# Patient Record
Sex: MALE | Employment: UNEMPLOYED | URBAN - METROPOLITAN AREA
[De-identification: names, ages, dates, MRNs, and addresses within clinical notes are randomized per-mention and may not be internally consistent; named-entity substitution may affect disease eponyms.]

---

## 2021-09-16 ENCOUNTER — APPOINTMENT (OUTPATIENT)
Dept: GENERAL RADIOLOGY | Facility: HOSPITAL | Age: 41
End: 2021-09-16
Attending: STUDENT IN AN ORGANIZED HEALTH CARE EDUCATION/TRAINING PROGRAM
Payer: MEDICAID

## 2021-09-16 ENCOUNTER — HOSPITAL ENCOUNTER (OUTPATIENT)
Facility: HOSPITAL | Age: 41
Setting detail: OBSERVATION
LOS: 1 days | Discharge: HOME | End: 2021-09-17
Attending: EMERGENCY MEDICINE | Admitting: STUDENT IN AN ORGANIZED HEALTH CARE EDUCATION/TRAINING PROGRAM
Payer: MEDICAID

## 2021-09-16 ENCOUNTER — ANESTHESIA EVENT (OUTPATIENT)
Dept: SURGERY | Facility: HOSPITAL | Age: 41
End: 2021-09-16
Payer: MEDICAID

## 2021-09-16 ENCOUNTER — APPOINTMENT (OUTPATIENT)
Dept: ULTRASOUND IMAGING | Facility: HOSPITAL | Age: 41
End: 2021-09-16
Attending: EMERGENCY MEDICINE
Payer: MEDICAID

## 2021-09-16 ENCOUNTER — ANESTHESIA (OUTPATIENT)
Dept: SURGERY | Facility: HOSPITAL | Age: 41
End: 2021-09-16
Payer: MEDICAID

## 2021-09-16 DIAGNOSIS — K80.20 CHOLELITHIASIS: ICD-10-CM

## 2021-09-16 DIAGNOSIS — K81.0 ACUTE CHOLECYSTITIS: ICD-10-CM

## 2021-09-16 DIAGNOSIS — R10.11 ABDOMINAL PAIN, RIGHT UPPER QUADRANT: Primary | ICD-10-CM

## 2021-09-16 PROBLEM — K80.00 CHOLELITHIASIS WITH ACUTE CHOLECYSTITIS WITHOUT BILIARY OBSTRUCTION: Status: ACTIVE | Noted: 2021-09-16

## 2021-09-16 PROBLEM — E87.6 HYPOKALEMIA: Status: ACTIVE | Noted: 2021-09-16

## 2021-09-16 PROBLEM — R73.9 HYPERGLYCEMIA: Status: ACTIVE | Noted: 2021-09-16

## 2021-09-16 LAB
ALBUMIN SERPL-MCNC: 3.5 G/DL (ref 3.4–5)
ALBUMIN/GLOB SERPL: 0.9 {RATIO} (ref 1–2)
ALP LIVER SERPL-CCNC: 69 U/L
ALT SERPL-CCNC: 45 U/L
ANION GAP SERPL CALC-SCNC: 6 MMOL/L (ref 0–18)
AST SERPL-CCNC: 27 U/L (ref 15–37)
BASOPHILS # BLD AUTO: 0.13 X10(3) UL (ref 0–0.2)
BASOPHILS NFR BLD AUTO: 1.1 %
BILIRUB SERPL-MCNC: 0.3 MG/DL (ref 0.1–2)
BUN BLD-MCNC: 18 MG/DL (ref 7–18)
CALCIUM BLD-MCNC: 8.8 MG/DL (ref 8.5–10.1)
CHLORIDE SERPL-SCNC: 105 MMOL/L (ref 98–112)
CO2 SERPL-SCNC: 29 MMOL/L (ref 21–32)
CREAT BLD-MCNC: 1.22 MG/DL
EOSINOPHIL # BLD AUTO: 0.46 X10(3) UL (ref 0–0.7)
EOSINOPHIL NFR BLD AUTO: 4 %
ERYTHROCYTE [DISTWIDTH] IN BLOOD BY AUTOMATED COUNT: 12.9 %
GLOBULIN PLAS-MCNC: 3.9 G/DL (ref 2.8–4.4)
GLUCOSE BLD-MCNC: 159 MG/DL (ref 70–99)
HCT VFR BLD AUTO: 46.6 %
HGB BLD-MCNC: 16 G/DL
IMM GRANULOCYTES # BLD AUTO: 0.05 X10(3) UL (ref 0–1)
IMM GRANULOCYTES NFR BLD: 0.4 %
LIPASE SERPL-CCNC: 100 U/L (ref 73–393)
LYMPHOCYTES # BLD AUTO: 2.34 X10(3) UL (ref 1–4)
LYMPHOCYTES NFR BLD AUTO: 20.2 %
MCH RBC QN AUTO: 30.5 PG (ref 26–34)
MCHC RBC AUTO-ENTMCNC: 34.3 G/DL (ref 31–37)
MCV RBC AUTO: 88.9 FL
MONOCYTES # BLD AUTO: 0.61 X10(3) UL (ref 0.1–1)
MONOCYTES NFR BLD AUTO: 5.3 %
NEUTROPHILS # BLD AUTO: 7.98 X10 (3) UL (ref 1.5–7.7)
NEUTROPHILS # BLD AUTO: 7.98 X10(3) UL (ref 1.5–7.7)
NEUTROPHILS NFR BLD AUTO: 69 %
OSMOLALITY SERPL CALC.SUM OF ELEC: 295 MOSM/KG (ref 275–295)
PLATELET # BLD AUTO: 235 10(3)UL (ref 150–450)
POTASSIUM SERPL-SCNC: 3.5 MMOL/L (ref 3.5–5.1)
PROT SERPL-MCNC: 7.4 G/DL (ref 6.4–8.2)
RBC # BLD AUTO: 5.24 X10(6)UL
SARS-COV-2 RNA RESP QL NAA+PROBE: NOT DETECTED
SODIUM SERPL-SCNC: 140 MMOL/L (ref 136–145)
WBC # BLD AUTO: 11.6 X10(3) UL (ref 4–11)

## 2021-09-16 PROCEDURE — 76700 US EXAM ABDOM COMPLETE: CPT | Performed by: EMERGENCY MEDICINE

## 2021-09-16 PROCEDURE — 0WQF0ZZ REPAIR ABDOMINAL WALL, OPEN APPROACH: ICD-10-PCS | Performed by: STUDENT IN AN ORGANIZED HEALTH CARE EDUCATION/TRAINING PROGRAM

## 2021-09-16 PROCEDURE — BF141ZZ FLUOROSCOPY OF GALLBLADDER, BILE DUCTS AND PANCREATIC DUCTS USING LOW OSMOLAR CONTRAST: ICD-10-PCS | Performed by: STUDENT IN AN ORGANIZED HEALTH CARE EDUCATION/TRAINING PROGRAM

## 2021-09-16 PROCEDURE — 47563 LAPARO CHOLECYSTECTOMY/GRAPH: CPT | Performed by: SURGERY

## 2021-09-16 PROCEDURE — 47563 LAPARO CHOLECYSTECTOMY/GRAPH: CPT | Performed by: STUDENT IN AN ORGANIZED HEALTH CARE EDUCATION/TRAINING PROGRAM

## 2021-09-16 PROCEDURE — 0FT44ZZ RESECTION OF GALLBLADDER, PERCUTANEOUS ENDOSCOPIC APPROACH: ICD-10-PCS | Performed by: STUDENT IN AN ORGANIZED HEALTH CARE EDUCATION/TRAINING PROGRAM

## 2021-09-16 PROCEDURE — 74300 X-RAY BILE DUCTS/PANCREAS: CPT | Performed by: STUDENT IN AN ORGANIZED HEALTH CARE EDUCATION/TRAINING PROGRAM

## 2021-09-16 RX ORDER — HYDROMORPHONE HYDROCHLORIDE 1 MG/ML
0.2 INJECTION, SOLUTION INTRAMUSCULAR; INTRAVENOUS; SUBCUTANEOUS EVERY 2 HOUR PRN
Status: DISCONTINUED | OUTPATIENT
Start: 2021-09-16 | End: 2021-09-16 | Stop reason: HOSPADM

## 2021-09-16 RX ORDER — SODIUM CHLORIDE, SODIUM LACTATE, POTASSIUM CHLORIDE, CALCIUM CHLORIDE 600; 310; 30; 20 MG/100ML; MG/100ML; MG/100ML; MG/100ML
INJECTION, SOLUTION INTRAVENOUS CONTINUOUS PRN
Status: DISCONTINUED | OUTPATIENT
Start: 2021-09-16 | End: 2021-09-16 | Stop reason: SURG

## 2021-09-16 RX ORDER — OXYCODONE HYDROCHLORIDE 5 MG/1
5 TABLET ORAL EVERY 6 HOURS PRN
Qty: 10 TABLET | Refills: 0 | Status: SHIPPED | OUTPATIENT
Start: 2021-09-16

## 2021-09-16 RX ORDER — HYDROMORPHONE HYDROCHLORIDE 1 MG/ML
0.8 INJECTION, SOLUTION INTRAMUSCULAR; INTRAVENOUS; SUBCUTANEOUS EVERY 2 HOUR PRN
Status: DISCONTINUED | OUTPATIENT
Start: 2021-09-16 | End: 2021-09-16

## 2021-09-16 RX ORDER — DIPHENHYDRAMINE HYDROCHLORIDE 50 MG/ML
12.5 INJECTION INTRAMUSCULAR; INTRAVENOUS AS NEEDED
Status: DISCONTINUED | OUTPATIENT
Start: 2021-09-16 | End: 2021-09-16 | Stop reason: HOSPADM

## 2021-09-16 RX ORDER — KETOROLAC TROMETHAMINE 30 MG/ML
INJECTION, SOLUTION INTRAMUSCULAR; INTRAVENOUS AS NEEDED
Status: DISCONTINUED | OUTPATIENT
Start: 2021-09-16 | End: 2021-09-16 | Stop reason: SURG

## 2021-09-16 RX ORDER — ONDANSETRON 2 MG/ML
INJECTION INTRAMUSCULAR; INTRAVENOUS AS NEEDED
Status: DISCONTINUED | OUTPATIENT
Start: 2021-09-16 | End: 2021-09-16 | Stop reason: SURG

## 2021-09-16 RX ORDER — HYDROMORPHONE HYDROCHLORIDE 1 MG/ML
1 INJECTION, SOLUTION INTRAMUSCULAR; INTRAVENOUS; SUBCUTANEOUS ONCE
Status: COMPLETED | OUTPATIENT
Start: 2021-09-16 | End: 2021-09-16

## 2021-09-16 RX ORDER — DEXAMETHASONE SODIUM PHOSPHATE 4 MG/ML
VIAL (ML) INJECTION AS NEEDED
Status: DISCONTINUED | OUTPATIENT
Start: 2021-09-16 | End: 2021-09-16 | Stop reason: SURG

## 2021-09-16 RX ORDER — ROCURONIUM BROMIDE 10 MG/ML
INJECTION, SOLUTION INTRAVENOUS AS NEEDED
Status: DISCONTINUED | OUTPATIENT
Start: 2021-09-16 | End: 2021-09-16 | Stop reason: SURG

## 2021-09-16 RX ORDER — SODIUM CHLORIDE, SODIUM LACTATE, POTASSIUM CHLORIDE, CALCIUM CHLORIDE 600; 310; 30; 20 MG/100ML; MG/100ML; MG/100ML; MG/100ML
INJECTION, SOLUTION INTRAVENOUS CONTINUOUS
Status: DISCONTINUED | OUTPATIENT
Start: 2021-09-16 | End: 2021-09-16 | Stop reason: HOSPADM

## 2021-09-16 RX ORDER — IBUPROFEN 200 MG
200 TABLET ORAL EVERY 6 HOURS PRN
COMMUNITY

## 2021-09-16 RX ORDER — ONDANSETRON 2 MG/ML
4 INJECTION INTRAMUSCULAR; INTRAVENOUS EVERY 6 HOURS PRN
Status: DISCONTINUED | OUTPATIENT
Start: 2021-09-16 | End: 2021-09-17

## 2021-09-16 RX ORDER — SODIUM CHLORIDE 9 MG/ML
INJECTION, SOLUTION INTRAVENOUS CONTINUOUS
Status: ACTIVE | OUTPATIENT
Start: 2021-09-16 | End: 2021-09-17

## 2021-09-16 RX ORDER — OXYCODONE HYDROCHLORIDE 5 MG/1
5 TABLET ORAL EVERY 6 HOURS PRN
Status: DISCONTINUED | OUTPATIENT
Start: 2021-09-16 | End: 2021-09-17

## 2021-09-16 RX ORDER — BUPIVACAINE HYDROCHLORIDE AND EPINEPHRINE 5; 5 MG/ML; UG/ML
INJECTION, SOLUTION EPIDURAL; INTRACAUDAL; PERINEURAL AS NEEDED
Status: DISCONTINUED | OUTPATIENT
Start: 2021-09-16 | End: 2021-09-16 | Stop reason: HOSPADM

## 2021-09-16 RX ORDER — HYDROMORPHONE HYDROCHLORIDE 1 MG/ML
0.4 INJECTION, SOLUTION INTRAMUSCULAR; INTRAVENOUS; SUBCUTANEOUS EVERY 5 MIN PRN
Status: DISCONTINUED | OUTPATIENT
Start: 2021-09-16 | End: 2021-09-16 | Stop reason: HOSPADM

## 2021-09-16 RX ORDER — ACETAMINOPHEN 325 MG/1
650 TABLET ORAL EVERY 6 HOURS PRN
Status: DISCONTINUED | OUTPATIENT
Start: 2021-09-16 | End: 2021-09-17

## 2021-09-16 RX ORDER — SODIUM CHLORIDE 9 MG/ML
1000 INJECTION, SOLUTION INTRAVENOUS CONTINUOUS
Status: DISCONTINUED | OUTPATIENT
Start: 2021-09-16 | End: 2021-09-16 | Stop reason: HOSPADM

## 2021-09-16 RX ORDER — IBUPROFEN 400 MG/1
800 TABLET ORAL EVERY 6 HOURS PRN
Status: DISCONTINUED | OUTPATIENT
Start: 2021-09-16 | End: 2021-09-17

## 2021-09-16 RX ORDER — MIDAZOLAM HYDROCHLORIDE 1 MG/ML
INJECTION INTRAMUSCULAR; INTRAVENOUS AS NEEDED
Status: DISCONTINUED | OUTPATIENT
Start: 2021-09-16 | End: 2021-09-16 | Stop reason: SURG

## 2021-09-16 RX ORDER — METOCLOPRAMIDE HYDROCHLORIDE 5 MG/ML
10 INJECTION INTRAMUSCULAR; INTRAVENOUS AS NEEDED
Status: DISCONTINUED | OUTPATIENT
Start: 2021-09-16 | End: 2021-09-16 | Stop reason: HOSPADM

## 2021-09-16 RX ORDER — ONDANSETRON 2 MG/ML
4 INJECTION INTRAMUSCULAR; INTRAVENOUS ONCE
Status: COMPLETED | OUTPATIENT
Start: 2021-09-16 | End: 2021-09-16

## 2021-09-16 RX ORDER — MIDAZOLAM HYDROCHLORIDE 1 MG/ML
1 INJECTION INTRAMUSCULAR; INTRAVENOUS EVERY 5 MIN PRN
Status: DISCONTINUED | OUTPATIENT
Start: 2021-09-16 | End: 2021-09-16 | Stop reason: HOSPADM

## 2021-09-16 RX ORDER — SENNA AND DOCUSATE SODIUM 50; 8.6 MG/1; MG/1
1 TABLET, FILM COATED ORAL DAILY
Qty: 30 TABLET | Refills: 0 | Status: SHIPPED | OUTPATIENT
Start: 2021-09-16

## 2021-09-16 RX ORDER — NALOXONE HYDROCHLORIDE 0.4 MG/ML
80 INJECTION, SOLUTION INTRAMUSCULAR; INTRAVENOUS; SUBCUTANEOUS AS NEEDED
Status: DISCONTINUED | OUTPATIENT
Start: 2021-09-16 | End: 2021-09-16 | Stop reason: HOSPADM

## 2021-09-16 RX ORDER — HYDROMORPHONE HYDROCHLORIDE 1 MG/ML
0.2 INJECTION, SOLUTION INTRAMUSCULAR; INTRAVENOUS; SUBCUTANEOUS EVERY 2 HOUR PRN
Status: DISCONTINUED | OUTPATIENT
Start: 2021-09-16 | End: 2021-09-16

## 2021-09-16 RX ORDER — HYDROMORPHONE HYDROCHLORIDE 1 MG/ML
0.5 INJECTION, SOLUTION INTRAMUSCULAR; INTRAVENOUS; SUBCUTANEOUS ONCE
Status: COMPLETED | OUTPATIENT
Start: 2021-09-16 | End: 2021-09-16

## 2021-09-16 RX ORDER — HYDROCODONE BITARTRATE AND ACETAMINOPHEN 5; 325 MG/1; MG/1
2 TABLET ORAL AS NEEDED
Status: DISCONTINUED | OUTPATIENT
Start: 2021-09-16 | End: 2021-09-16 | Stop reason: HOSPADM

## 2021-09-16 RX ORDER — HYDROMORPHONE HYDROCHLORIDE 1 MG/ML
0.1 INJECTION, SOLUTION INTRAMUSCULAR; INTRAVENOUS; SUBCUTANEOUS EVERY 2 HOUR PRN
Status: DISCONTINUED | OUTPATIENT
Start: 2021-09-16 | End: 2021-09-16 | Stop reason: HOSPADM

## 2021-09-16 RX ORDER — MORPHINE SULFATE 4 MG/ML
4 INJECTION, SOLUTION INTRAMUSCULAR; INTRAVENOUS ONCE
Status: COMPLETED | OUTPATIENT
Start: 2021-09-16 | End: 2021-09-16

## 2021-09-16 RX ORDER — SODIUM CHLORIDE 9 MG/ML
INJECTION, SOLUTION INTRAVENOUS CONTINUOUS
Status: DISCONTINUED | OUTPATIENT
Start: 2021-09-16 | End: 2021-09-16

## 2021-09-16 RX ORDER — HYDROMORPHONE HYDROCHLORIDE 1 MG/ML
0.4 INJECTION, SOLUTION INTRAMUSCULAR; INTRAVENOUS; SUBCUTANEOUS EVERY 2 HOUR PRN
Status: DISCONTINUED | OUTPATIENT
Start: 2021-09-16 | End: 2021-09-16

## 2021-09-16 RX ORDER — ONDANSETRON 2 MG/ML
4 INJECTION INTRAMUSCULAR; INTRAVENOUS AS NEEDED
Status: DISCONTINUED | OUTPATIENT
Start: 2021-09-16 | End: 2021-09-16 | Stop reason: HOSPADM

## 2021-09-16 RX ORDER — HYDROCODONE BITARTRATE AND ACETAMINOPHEN 5; 325 MG/1; MG/1
1 TABLET ORAL AS NEEDED
Status: DISCONTINUED | OUTPATIENT
Start: 2021-09-16 | End: 2021-09-16 | Stop reason: HOSPADM

## 2021-09-16 RX ORDER — HYDROMORPHONE HYDROCHLORIDE 1 MG/ML
0.4 INJECTION, SOLUTION INTRAMUSCULAR; INTRAVENOUS; SUBCUTANEOUS EVERY 2 HOUR PRN
Status: DISCONTINUED | OUTPATIENT
Start: 2021-09-16 | End: 2021-09-16 | Stop reason: HOSPADM

## 2021-09-16 RX ORDER — MEPERIDINE HYDROCHLORIDE 25 MG/ML
12.5 INJECTION INTRAMUSCULAR; INTRAVENOUS; SUBCUTANEOUS AS NEEDED
Status: DISCONTINUED | OUTPATIENT
Start: 2021-09-16 | End: 2021-09-16 | Stop reason: HOSPADM

## 2021-09-16 RX ADMIN — DEXAMETHASONE SODIUM PHOSPHATE 8 MG: 4 MG/ML VIAL (ML) INJECTION at 17:02:00

## 2021-09-16 RX ADMIN — SODIUM CHLORIDE, SODIUM LACTATE, POTASSIUM CHLORIDE, CALCIUM CHLORIDE: 600; 310; 30; 20 INJECTION, SOLUTION INTRAVENOUS at 17:53:00

## 2021-09-16 RX ADMIN — MIDAZOLAM HYDROCHLORIDE 2 MG: 1 INJECTION INTRAMUSCULAR; INTRAVENOUS at 16:34:00

## 2021-09-16 RX ADMIN — KETOROLAC TROMETHAMINE 30 MG: 30 INJECTION, SOLUTION INTRAMUSCULAR; INTRAVENOUS at 17:45:00

## 2021-09-16 RX ADMIN — ONDANSETRON 4 MG: 2 INJECTION INTRAMUSCULAR; INTRAVENOUS at 17:02:00

## 2021-09-16 RX ADMIN — SODIUM CHLORIDE, SODIUM LACTATE, POTASSIUM CHLORIDE, CALCIUM CHLORIDE: 600; 310; 30; 20 INJECTION, SOLUTION INTRAVENOUS at 16:32:00

## 2021-09-16 RX ADMIN — ROCURONIUM BROMIDE 70 MG: 10 INJECTION, SOLUTION INTRAVENOUS at 16:37:00

## 2021-09-16 NOTE — ANESTHESIA PREPROCEDURE EVALUATION
PRE-OP EVALUATION    Patient Name: Diana Mcdowell    Admit Diagnosis: Acute cholecystitis [K81.0]  Abdominal pain, right upper quadrant [R10.11]    Pre-op Diagnosis: Cholelithiasis [K80.20]    LAPAROSCOPIC CHOLECYSTECTOMY WITH INTRAOPERATIVE Alveta Konstantin (six) hours as needed for Pain., Disp: , Rfl: , 9/15/2021 at Unknown time        Allergies: Patient has no known allergies. Anesthesia Evaluation    Patient summary reviewed.     Anesthetic Complications  (-) history of anesthetic complications dental trauma, nausea/vomiting  Plan/risks discussed with: patient and spouse                Present on Admission:  • Hypokalemia  • Hyperglycemia

## 2021-09-16 NOTE — ANESTHESIA PROCEDURE NOTES
Peripheral IV  Date/Time: 9/16/2021 4:50 PM  Inserted by: Rebekah Harkins MD    Placement  Needle size: 18 G  Laterality: right  Location: hand  Local anesthetic: none  Site prep: chlorhexidine and alcohol  Technique: anatomical landmarks  Attempts: 1

## 2021-09-16 NOTE — OPERATIVE REPORT
General Surgery Operative Note  Emily Mario Location: OR   CSN [id number] MRN [id number]    1980 Age 38year old   Admission Date 2021 Operation Date 2021   Attending Physician Arina Martinez, [address] [place] [address] Operating Physician Monalisa Ross DESCRIPTION OF PROCEDURE:   After confirmation of informed consent, the patient was transferred to the operating room under the care of the surgical anesthesia team and placed in the supine position.   Left arm was tucked in general anesthesia was induced scissors. The gallbladder was then removed from the liver bed using electrocautery. Hemostasis of the liver bed was achieved with electrocautery.   The gallbladder was then placed into an Endo Catch bag and removed from the operative field pending final p

## 2021-09-16 NOTE — PROGRESS NOTES
NURSING ADMISSION NOTE      Patient admitted via Cart  Oriented to room. Safety precautions initiated. Bed in low position. Call light in reach. Pt arrived to unit shortly after 0900. Wife Antonio Last at bedside and able to help with admission.   Thais Catherine

## 2021-09-16 NOTE — ED PROVIDER NOTES
Patient Seen in: [facility] Emergency Department      History   Patient presents with:  Abdomen/Flank Pain  Nausea/Vomiting/Diarrhea    Stated Complaint: RUQ pain, vomiting x3 pta    Subjective:   HPI    54-year-old male presents emergency departmen severe pain distress  HEENT: Pupils equal react to light extraocular muscles intact no scleral icterus, mucous membranes are moist, there is no erythema or exudate in the posterior pharynx  Neck: Supple no JVD no lymphadenopathy no meningismus no carotid b COMPLETE (CPT=76700)    Result Date: 9/16/2021  PROCEDURE:  US ABDOMEN COMPLETE (CPT=76700)  COMPARISON:  None. INDICATIONS:  RUQ pain, vomiting x3 pta  TECHNIQUE:  Real time gray-scale ultrasound was used to evaluate the abdomen.   The exam includes image answered. This note was prepared using [facility] EpicTopic voice recognition dictation software. As a result errors may occur. When identified these errors have been corrected.  While every attempt is made to correct errors during dictation discrepancies may

## 2021-09-16 NOTE — H&P
[facility]  Report of Consultation    Jose Abdi Patient Status:  Emergency    1980 MRN [id number]   Location [address] [facility] Attending Gali Chi MD   Hosp Day # 0 PCP No primary care provider on file.        Abril Continuous    Review of Systems:  Review of Systems   Constitutional: Negative for chills, diaphoresis, fatigue, fever and unexpected weight change. HENT: Negative for hearing loss, nosebleeds, sore throat and trouble swallowing.     Respiratory: Negative Labs   Lab 09/16/21  0356   RBC 5.24   HGB 16.0   HCT 46.6   MCV 88.9   MCH 30.5   MCHC 34.3   RDW 12.9   NEPRELIM 7.98*   WBC 11.6*   .0       Recent Labs   Lab 09/16/21 0356   *   BUN 18   CREATSERUM 1.22   GFRAA 85   GFRNAA 73   CA 8.8 afebrile but had an elevated white count to 11.6 and ultrasound findings of acute cholecystitis with 2 cm stone in the neck of the gallbladder. General surgery was consulted    General: Alert, orientated x3. Cooperative. No apparent distress.   CV: regular

## 2021-09-16 NOTE — ANESTHESIA POSTPROCEDURE EVALUATION
[address] [facility] Patient Status:  Inpatient   Age/Gender 38year old male MRN [id number]   [facility] SURGERY Attending Nura Clark, Marisa Hernández, [address] Day # 0 PCP No primary care provider on file.        Anesthesia Post-op

## 2021-09-17 VITALS
HEART RATE: 84 BPM | WEIGHT: 260 LBS | TEMPERATURE: 98 F | OXYGEN SATURATION: 94 % | RESPIRATION RATE: 24 BRPM | SYSTOLIC BLOOD PRESSURE: 157 MMHG | DIASTOLIC BLOOD PRESSURE: 92 MMHG

## 2021-09-17 NOTE — PLAN OF CARE
Pt vss, AO x4. Pt SHERRY; denies using CPAP, reports he needed to follow up for sleep study; desaturations noted while asleep; 1 L O2 applied for sleep times. On RA while awake; denies KELSIE/SOB/Lightheadedness/CP.  Pt had clears for dinner, no nausea, will have interventions unsuccessful or patient reports new pain  - Anticipate increased pain with activity and pre-medicate as appropriate  Outcome: Progressing     Problem: RISK FOR INFECTION - ADULT  Goal: Absence of fever/infection during anticipated neutropenic

## 2021-09-17 NOTE — CM/SW NOTE
Per RN nursing rounds, pt will need PCP f/u at discharge and told RN he does not have a primary care MD. Rebekah Dillard found pt's insurance card on file, and [facility] PA is listed as pt's primary MD. TREY provided [facility] on pt's AVS.

## 2021-09-17 NOTE — PLAN OF CARE
Pt a&o x 4. Cheerful & cooperative w/ care  O2 walks done this am & wnl. Pt aware to f/u with tcc & pcp to discuss a sleep study  Lap sites x 4 cdi w/ skin glue. Areas well approximated & dennise.   Reports pain controlled w/ motrin  Up with steady gait  Angélica growth factors as ordered  - Implement neutropenic guidelines  Outcome: Progressing     Problem: SAFETY ADULT - FALL  Goal: Free from fall injury  Description: INTERVENTIONS:  - Assess pt frequently for physical needs  - Identify cognitive and physical def measures as appropriate  - Advance diet as tolerated, if ordered  - Obtain nutritional consult as needed  - Evaluate fluid balance  Outcome: Progressing  Goal: Maintains or returns to baseline bowel function  Description: INTERVENTIONS:  - Assess bowel fun

## 2021-09-17 NOTE — PROGRESS NOTES
[facility]  Progress Note    Helen Narvaez Patient Status:  Inpatient    1980 MRN [id number]   [facility] 3NW-A Attending Taj Orr, [address] [facility] Day # 1 PCP No primary care provider on file. Subjective:   The pat

## 2021-09-17 NOTE — PLAN OF CARE
NURSING ADMISSION NOTE  Pt arrived from the PACU in stable condition     Patient admitted via [address] to room. Safety precautions initiated. Bed in low position. Call light in reach.

## 2021-09-17 NOTE — PROGRESS NOTES
Pt discharged home. Discharge instructions given to pt, along with diet, hydration, activity, wound care, hygiene, review of home meds, instucted to  rx's for senna and oxy at his [facility] pharmacy & f/u care.   Pt states he is aware of f/u with t

## 2021-09-20 ENCOUNTER — PATIENT OUTREACH (OUTPATIENT)
Dept: CASE MANAGEMENT | Age: 41
End: 2021-09-20

## 2021-09-21 ENCOUNTER — OFFICE VISIT (OUTPATIENT)
Dept: INTERNAL MEDICINE CLINIC | Facility: CLINIC | Age: 41
End: 2021-09-21
Payer: MEDICAID

## 2021-09-21 VITALS
DIASTOLIC BLOOD PRESSURE: 104 MMHG | HEIGHT: 72 IN | BODY MASS INDEX: 35.62 KG/M2 | OXYGEN SATURATION: 97 % | TEMPERATURE: 97 F | WEIGHT: 263 LBS | SYSTOLIC BLOOD PRESSURE: 142 MMHG | RESPIRATION RATE: 18 BRPM | HEART RATE: 99 BPM

## 2021-09-21 DIAGNOSIS — K42.9 UMBILICAL HERNIA WITHOUT OBSTRUCTION AND WITHOUT GANGRENE: ICD-10-CM

## 2021-09-21 DIAGNOSIS — R03.0 ELEVATED BP WITHOUT DIAGNOSIS OF HYPERTENSION: ICD-10-CM

## 2021-09-21 DIAGNOSIS — K81.9 CHOLECYSTITIS: Primary | ICD-10-CM

## 2021-09-21 PROCEDURE — 3080F DIAST BP >= 90 MM HG: CPT | Performed by: NURSE PRACTITIONER

## 2021-09-21 PROCEDURE — 99214 OFFICE O/P EST MOD 30 MIN: CPT | Performed by: NURSE PRACTITIONER

## 2021-09-21 PROCEDURE — 3077F SYST BP >= 140 MM HG: CPT | Performed by: NURSE PRACTITIONER

## 2021-09-21 PROCEDURE — 3008F BODY MASS INDEX DOCD: CPT | Performed by: NURSE PRACTITIONER

## 2021-09-21 RX ORDER — ACETAMINOPHEN 500 MG
500 TABLET ORAL EVERY 6 HOURS PRN
COMMUNITY

## 2021-09-21 NOTE — PROGRESS NOTES
TRANSITIONAL CARE CLINIC PHARMACIST MEDICATION RECONCILIATION        Joel Talbert MRN [id number]    1980 PCP No primary care provider on file.        Comments: Medication history completed by the [address] [place] [address] Pharmacist with the pa you,    Barbara Blank, PharmD, 9/21/2021, 9:57 AM  [facility]

## 2021-09-21 NOTE — PROGRESS NOTES
[facility]      HISTORY   CHIEF COMPLAINT: HFU-Cholecystitis s/p laparoscopic cholecystectomy and umbilical hernia s/p repair, and elevated BP without diagnosis of HTN.     HPI: Marlene Ramsey is a 38year old was also recommended to have OP sleep study, which will be ordered by his PCP once established.  He has no other concerns at time of exam.     Interactive contact within 2 business days post discharge first initiated on Date: 9/20/2021      Allergies:  No K (CST): Marnie Epps MD on 9/16/2021 at 6:36 PM     Finalized by (CST): Marnie Epps MD on 9/16/2021 at 6:36 PM         Lab Results   Component Value Date/Time    WBC 11.6 (H) 09/16/2021 03:56 AM    HGB 16.0 09/16/2021 03:56 AM    HCT 46.6 09/16/2021 03 tenderness  MUSCULOSKELETAL: + ROM in all joints, no evidence of swelling, pain   EXTREMITIES: no cyanosis, or edema  NEURO: Oriented x3  PSYCHIATRIC: appropriate affect    ASSESSMENT/ PLAN:   1.  Cholecystitis  · S/P lap cholecystectomy   · Lap sites x 4 t Cessation Counseling 2 Years due on 09/16/2023    Chronic Care Management Referral: N/A      Transitional Care Management Certification:  During the visit, the following was completed:  ?  Obtained and reviewed discharge information  and continuity of care Linda Lew, 9/21/2021  [address] [place] [address]  [phone]

## 2021-09-21 NOTE — PATIENT INSTRUCTIONS
Discharge Instructions:     · Check blood pressure in mornings and in evenings and keep log or bring monitor to follow-up with [facility] on 9/28 at 10:30am for review of blood pressure logs      After Laparoscopic Hernia Repair  You had a procedure called laparo stairs slowly and pause after every few steps. · Walk as often as you feel able. · Ask your healthcare provider when you can drive again. This may be when you stop taking pain medicine and can move comfortably from side to side.  Don’t drive if you are st

## 2021-09-22 NOTE — PAYOR COMM NOTE
--------------  ADMISSION REVIEW     Payor: Marquise Escudero #:  [id number]  Authorization Number: [id number]    Admit date: 9/16/21  Admit time:  8:58 AM       REVIEW DOCUMENTATION:     ED Provider Notes      ED Prov rub  Respiratory: Clear to auscultation bilaterally no crackles no wheezes no accessory muscle use  Abdomen: Patient does have right upper quadrant tenderness positive Knight sign, no rebound no guarding  no hepatosplenomegaly bowel sounds are present , no Author: Harper Castro DO Service: General Surgery Author Type: Physician    Filed: 9/16/2021  4:35 PM Date of Service: 9/16/2021  8:57 AM Status: Addendum    : Harper Castro DO (Physician)    Related Notes: Original Note by Saulo Jang wheezing. Cardiovascular: Negative for chest pain, palpitations and leg swelling. Gastrointestinal: Positive for abdominal distention, abdominal pain, nausea and vomiting. Negative for anal bleeding, blood in stool, constipation and diarrhea.    Genito pain, right upper quadrant     Cholelithiasis with acute cholecystitis without biliary obstruction      Cholelithiasis with acute cholecystitis without biliary obstruction    Plan:  The patient is to be scheduled for laparoscopic cholecystectomy with intra you for allowing me to care for this patient. This note was initiated by Lynne Michelle PA-C. The PA saw the patient in conjunction with me. The PA performed a history, exam, and developed the assessment and plan.  I agree with the mentioned assessment a detail, including but not limited to bleeding, postoperative infection and/or abscess creation, injury to adjacent organs and tissues, postoperative pain, conversion to open procedure or the need for further therapeutic, diagnostic, or surgical interventio structure and a small ductotomy was made using EndoShears. A cholangiogram was then performed through the ductotomy. See cholangiogram imaging.   Contrast was seen flowing proximally and distally from the cystic duct and filling the liver ducts and down t was extubated, and brought to the postoperative recovery room. CONDITION: Stable, doing well. Operative findings were discussed with the patient's family immediately upon conclusion of surgery.  Thank you for allowing me to participate in the care of thi

## 2021-09-23 NOTE — PAYOR COMM NOTE
Discharge Notification    Patient Name: Yaya Comer  Payor: Marquise Escudero #: [id number]  Authorization Number: [id number]  Admit Date/Time: 9/16/2021 3:48 AM  Discharge Date/Time: 9/17/2021 12:50 PM      STA

## 2021-09-28 ENCOUNTER — OFFICE VISIT (OUTPATIENT)
Dept: INTERNAL MEDICINE CLINIC | Facility: CLINIC | Age: 41
End: 2021-09-28
Payer: MEDICAID

## 2021-09-28 VITALS
HEART RATE: 94 BPM | DIASTOLIC BLOOD PRESSURE: 92 MMHG | RESPIRATION RATE: 18 BRPM | TEMPERATURE: 96 F | SYSTOLIC BLOOD PRESSURE: 140 MMHG | BODY MASS INDEX: 35.92 KG/M2 | HEIGHT: 72 IN | WEIGHT: 265.19 LBS | OXYGEN SATURATION: 96 %

## 2021-09-28 DIAGNOSIS — R03.0 ELEVATED BP WITHOUT DIAGNOSIS OF HYPERTENSION: ICD-10-CM

## 2021-09-28 DIAGNOSIS — K81.9 CHOLECYSTITIS: Primary | ICD-10-CM

## 2021-09-28 PROCEDURE — 99213 OFFICE O/P EST LOW 20 MIN: CPT | Performed by: NURSE PRACTITIONER

## 2021-09-28 PROCEDURE — 3080F DIAST BP >= 90 MM HG: CPT | Performed by: NURSE PRACTITIONER

## 2021-09-28 PROCEDURE — 3077F SYST BP >= 140 MM HG: CPT | Performed by: NURSE PRACTITIONER

## 2021-09-28 PROCEDURE — 3008F BODY MASS INDEX DOCD: CPT | Performed by: NURSE PRACTITIONER

## 2021-09-28 NOTE — PATIENT INSTRUCTIONS
Discharge Instructions:       Checking Your Blood Pressure  Step-by-Step    Ada last reviewed this educational content on 4/1/2020  © 0817-0644 The [facility]. All rights reserved.  This information is not intended as a substitute for kelton breading. ? Choose plain steamed rice, boiled noodles, and baked or boiled potatoes. Top potatoes with chives and a little sour cream instead of butter. · Don't take antacids that are high in salt. Check the label before you buy.   Follow-up  Make a follo

## 2021-09-29 NOTE — PROGRESS NOTES
TRANSITIONAL CARE CLINIC FOLLOW-UP VISIT     Concepción Rutherford MRN [id number]    1980 PCP No primary care provider on file.    Admit Date: 21  Discharge Date: 21  Hospital Discharge Diagnosis:      Cholecystitis s/p laparoscopic rain 09/16/2021 03:56 AM    HCT 46.6 09/16/2021 03:56 AM    .0 09/16/2021 03:56 AM     (H) 09/16/2021 03:56 AM     09/16/2021 03:56 AM    K 3.5 09/16/2021 03:56 AM     09/16/2021 03:56 AM    CO2 29.0 09/16/2021 03:56 AM    BUN 18 09/16 edema  NEURO: Oriented x3  PSYCHIATRIC: appropriate affect      IMPRESSION/ PLAN:   Diagnoses and all orders for this visit:    Cholecystitis  · S/P lap cholecystectomy/S/P umbilical hernia repair  · Lap sites x 4 to abdomen with surgi glue closure  · No s 9/28/2021   [facility]

## 2021-09-30 ENCOUNTER — TELEPHONE (OUTPATIENT)
Dept: SURGERY | Facility: CLINIC | Age: 41
End: 2021-09-30

## 2021-09-30 NOTE — TELEPHONE ENCOUNTER
Attempted to contact the patient for post-operative virtual visit. I left a message to have him call back.

## 2022-05-31 ENCOUNTER — HOSPITAL ENCOUNTER (INPATIENT)
Facility: HOSPITAL | Age: 42
LOS: 2 days | Discharge: HOME | End: 2022-06-02
Attending: STUDENT IN AN ORGANIZED HEALTH CARE EDUCATION/TRAINING PROGRAM | Admitting: HOSPITALIST
Payer: MEDICAID

## 2022-05-31 ENCOUNTER — APPOINTMENT (OUTPATIENT)
Dept: CV DIAGNOSTICS | Facility: HOSPITAL | Age: 42
End: 2022-05-31
Attending: NURSE PRACTITIONER
Payer: MEDICAID

## 2022-05-31 ENCOUNTER — APPOINTMENT (OUTPATIENT)
Dept: GENERAL RADIOLOGY | Facility: HOSPITAL | Age: 42
End: 2022-05-31
Attending: PHYSICIAN ASSISTANT
Payer: MEDICAID

## 2022-05-31 ENCOUNTER — APPOINTMENT (OUTPATIENT)
Dept: CT IMAGING | Facility: HOSPITAL | Age: 42
End: 2022-05-31
Attending: PHYSICIAN ASSISTANT
Payer: MEDICAID

## 2022-05-31 DIAGNOSIS — I50.9 HEART FAILURE, UNSPECIFIED HF CHRONICITY, UNSPECIFIED HEART FAILURE TYPE (HCC): ICD-10-CM

## 2022-05-31 DIAGNOSIS — J96.01 ACUTE RESPIRATORY FAILURE WITH HYPOXIA (HCC): ICD-10-CM

## 2022-05-31 DIAGNOSIS — I42.7 CARDIOMYOPATHY SECONDARY TO DRUG (HCC): Primary | ICD-10-CM

## 2022-05-31 PROBLEM — F17.219 CIGARETTE NICOTINE DEPENDENCE WITH NICOTINE-INDUCED DISORDER: Chronic | Status: ACTIVE | Noted: 2022-05-31

## 2022-05-31 PROBLEM — F12.20 MARIJUANA DEPENDENCE (HCC): Chronic | Status: ACTIVE | Noted: 2022-05-31

## 2022-05-31 PROBLEM — F10.20 UNCOMPLICATED ALCOHOL DEPENDENCE (HCC): Chronic | Status: ACTIVE | Noted: 2022-05-31

## 2022-05-31 PROBLEM — I50.21 CHF (CONGESTIVE HEART FAILURE), NYHA CLASS IV, ACUTE, SYSTOLIC (HCC): Status: ACTIVE | Noted: 2022-05-31

## 2022-05-31 PROBLEM — F14.10 COCAINE ABUSE (HCC): Chronic | Status: ACTIVE | Noted: 2022-05-31

## 2022-05-31 LAB
ALBUMIN SERPL-MCNC: 3.1 G/DL (ref 3.4–5)
ALBUMIN/GLOB SERPL: 0.8 {RATIO} (ref 1–2)
ALP LIVER SERPL-CCNC: 68 U/L
ALT SERPL-CCNC: 98 U/L
AMPHET UR QL SCN: NEGATIVE
ANION GAP SERPL CALC-SCNC: 7 MMOL/L (ref 0–18)
AST SERPL-CCNC: 44 U/L (ref 15–37)
ATRIAL RATE: 108 BPM
BASOPHILS # BLD AUTO: 0.08 X10(3) UL (ref 0–0.2)
BASOPHILS NFR BLD AUTO: 0.9 %
BENZODIAZ UR QL SCN: NEGATIVE
BILIRUB SERPL-MCNC: 0.6 MG/DL (ref 0.1–2)
BUN BLD-MCNC: 11 MG/DL (ref 7–18)
CALCIUM BLD-MCNC: 8.5 MG/DL (ref 8.5–10.1)
CANNABINOIDS UR QL SCN: NEGATIVE
CHLORIDE SERPL-SCNC: 111 MMOL/L (ref 98–112)
CO2 SERPL-SCNC: 24 MMOL/L (ref 21–32)
CREAT BLD-MCNC: 0.74 MG/DL
CREAT UR-SCNC: <13 MG/DL
D DIMER PPP FEU-MCNC: 1.53 UG/ML FEU (ref ?–0.5)
EOSINOPHIL # BLD AUTO: 0.49 X10(3) UL (ref 0–0.7)
EOSINOPHIL NFR BLD AUTO: 5.8 %
ERYTHROCYTE [DISTWIDTH] IN BLOOD BY AUTOMATED COUNT: 13.7 %
GLOBULIN PLAS-MCNC: 3.8 G/DL (ref 2.8–4.4)
GLUCOSE BLD-MCNC: 121 MG/DL (ref 70–99)
HCT VFR BLD AUTO: 48.1 %
HGB BLD-MCNC: 15.9 G/DL
IMM GRANULOCYTES # BLD AUTO: 0.03 X10(3) UL (ref 0–1)
IMM GRANULOCYTES NFR BLD: 0.4 %
LYMPHOCYTES # BLD AUTO: 2.21 X10(3) UL (ref 1–4)
LYMPHOCYTES NFR BLD AUTO: 26.2 %
MAGNESIUM SERPL-MCNC: 2.2 MG/DL (ref 1.6–2.6)
MCH RBC QN AUTO: 29.9 PG (ref 26–34)
MCHC RBC AUTO-ENTMCNC: 33.1 G/DL (ref 31–37)
MCV RBC AUTO: 90.6 FL
MDMA UR QL SCN: NEGATIVE
MONOCYTES # BLD AUTO: 0.63 X10(3) UL (ref 0.1–1)
MONOCYTES NFR BLD AUTO: 7.5 %
NEUTROPHILS # BLD AUTO: 5 X10 (3) UL (ref 1.5–7.7)
NEUTROPHILS # BLD AUTO: 5 X10(3) UL (ref 1.5–7.7)
NEUTROPHILS NFR BLD AUTO: 59.2 %
NT-PROBNP SERPL-MCNC: 1008 PG/ML (ref ?–125)
OPIATES UR QL SCN: NEGATIVE
OSMOLALITY SERPL CALC.SUM OF ELEC: 295 MOSM/KG (ref 275–295)
OXYCODONE UR QL SCN: NEGATIVE
P AXIS: 54 DEGREES
P-R INTERVAL: 152 MS
PLATELET # BLD AUTO: 210 10(3)UL (ref 150–450)
POTASSIUM SERPL-SCNC: 4 MMOL/L (ref 3.5–5.1)
PROCALCITONIN SERPL-MCNC: <0.05 NG/ML (ref ?–0.16)
PROT SERPL-MCNC: 6.9 G/DL (ref 6.4–8.2)
Q-T INTERVAL: 386 MS
QRS DURATION: 120 MS
QTC CALCULATION (BEZET): 517 MS
R AXIS: -7 DEGREES
RBC # BLD AUTO: 5.31 X10(6)UL
SARS-COV-2 RNA RESP QL NAA+PROBE: NOT DETECTED
SODIUM SERPL-SCNC: 142 MMOL/L (ref 136–145)
T AXIS: 88 DEGREES
TROPONIN I HIGH SENSITIVITY: 53 NG/L
TSI SER-ACNC: 1.94 MIU/ML (ref 0.36–3.74)
VENTRICULAR RATE: 108 BPM
WBC # BLD AUTO: 8.4 X10(3) UL (ref 4–11)

## 2022-05-31 PROCEDURE — 99223 1ST HOSP IP/OBS HIGH 75: CPT | Performed by: HOSPITALIST

## 2022-05-31 PROCEDURE — 71045 X-RAY EXAM CHEST 1 VIEW: CPT | Performed by: PHYSICIAN ASSISTANT

## 2022-05-31 PROCEDURE — 71275 CT ANGIOGRAPHY CHEST: CPT | Performed by: PHYSICIAN ASSISTANT

## 2022-05-31 PROCEDURE — 93306 TTE W/DOPPLER COMPLETE: CPT | Performed by: NURSE PRACTITIONER

## 2022-05-31 RX ORDER — ACETAMINOPHEN 325 MG/1
650 TABLET ORAL EVERY 6 HOURS PRN
Status: DISCONTINUED | OUTPATIENT
Start: 2022-05-31 | End: 2022-06-02

## 2022-05-31 RX ORDER — NICOTINE 21 MG/24HR
1 PATCH, TRANSDERMAL 24 HOURS TRANSDERMAL DAILY
Status: DISCONTINUED | OUTPATIENT
Start: 2022-05-31 | End: 2022-06-02

## 2022-05-31 RX ORDER — FUROSEMIDE 10 MG/ML
40 INJECTION INTRAMUSCULAR; INTRAVENOUS ONCE
Status: COMPLETED | OUTPATIENT
Start: 2022-05-31 | End: 2022-05-31

## 2022-05-31 RX ORDER — SENNOSIDES 8.6 MG
17.2 TABLET ORAL NIGHTLY PRN
Status: DISCONTINUED | OUTPATIENT
Start: 2022-05-31 | End: 2022-06-02

## 2022-05-31 RX ORDER — LOSARTAN POTASSIUM 100 MG/1
100 TABLET ORAL DAILY
Status: DISCONTINUED | OUTPATIENT
Start: 2022-06-01 | End: 2022-06-02

## 2022-05-31 RX ORDER — NITROGLYCERIN 0.4 MG/1
0.4 TABLET SUBLINGUAL ONCE
Status: COMPLETED | OUTPATIENT
Start: 2022-05-31 | End: 2022-05-31

## 2022-05-31 RX ORDER — BISACODYL 10 MG
10 SUPPOSITORY, RECTAL RECTAL
Status: DISCONTINUED | OUTPATIENT
Start: 2022-05-31 | End: 2022-06-02

## 2022-05-31 RX ORDER — LOSARTAN POTASSIUM 50 MG/1
50 TABLET ORAL ONCE
Status: COMPLETED | OUTPATIENT
Start: 2022-05-31 | End: 2022-05-31

## 2022-05-31 RX ORDER — LORAZEPAM 2 MG/ML
1 INJECTION INTRAMUSCULAR
Status: DISCONTINUED | OUTPATIENT
Start: 2022-05-31 | End: 2022-06-02

## 2022-05-31 RX ORDER — ONDANSETRON 2 MG/ML
4 INJECTION INTRAMUSCULAR; INTRAVENOUS EVERY 6 HOURS PRN
Status: DISCONTINUED | OUTPATIENT
Start: 2022-05-31 | End: 2022-06-02

## 2022-05-31 RX ORDER — LORAZEPAM 1 MG/1
1 TABLET ORAL
Status: DISCONTINUED | OUTPATIENT
Start: 2022-05-31 | End: 2022-06-02

## 2022-05-31 RX ORDER — LORAZEPAM 2 MG/ML
2 INJECTION INTRAMUSCULAR
Status: DISCONTINUED | OUTPATIENT
Start: 2022-05-31 | End: 2022-06-02

## 2022-05-31 RX ORDER — IOHEXOL 350 MG/ML
100 INJECTION, SOLUTION INTRAVENOUS
Status: COMPLETED | OUTPATIENT
Start: 2022-05-31 | End: 2022-05-31

## 2022-05-31 RX ORDER — ENOXAPARIN SODIUM 100 MG/ML
40 INJECTION SUBCUTANEOUS DAILY
Status: DISCONTINUED | OUTPATIENT
Start: 2022-05-31 | End: 2022-06-02

## 2022-05-31 RX ORDER — POLYETHYLENE GLYCOL 3350 17 G/17G
17 POWDER, FOR SOLUTION ORAL DAILY PRN
Status: DISCONTINUED | OUTPATIENT
Start: 2022-05-31 | End: 2022-06-02

## 2022-05-31 RX ORDER — FUROSEMIDE 10 MG/ML
40 INJECTION INTRAMUSCULAR; INTRAVENOUS
Status: DISCONTINUED | OUTPATIENT
Start: 2022-05-31 | End: 2022-06-02

## 2022-05-31 RX ORDER — LORAZEPAM 1 MG/1
2 TABLET ORAL
Status: DISCONTINUED | OUTPATIENT
Start: 2022-05-31 | End: 2022-06-02

## 2022-05-31 RX ORDER — DIGOXIN 0.25 MG/ML
250 INJECTION INTRAMUSCULAR; INTRAVENOUS EVERY 6 HOURS
Status: COMPLETED | OUTPATIENT
Start: 2022-05-31 | End: 2022-06-01

## 2022-05-31 RX ORDER — MELATONIN
3 NIGHTLY PRN
Status: DISCONTINUED | OUTPATIENT
Start: 2022-05-31 | End: 2022-06-02

## 2022-05-31 RX ORDER — PROCHLORPERAZINE EDISYLATE 5 MG/ML
5 INJECTION INTRAMUSCULAR; INTRAVENOUS EVERY 8 HOURS PRN
Status: DISCONTINUED | OUTPATIENT
Start: 2022-05-31 | End: 2022-06-02

## 2022-05-31 RX ORDER — SODIUM PHOSPHATE, DIBASIC AND SODIUM PHOSPHATE, MONOBASIC 7; 19 G/133ML; G/133ML
1 ENEMA RECTAL ONCE AS NEEDED
Status: DISCONTINUED | OUTPATIENT
Start: 2022-05-31 | End: 2022-06-02

## 2022-05-31 NOTE — ED QUICK NOTES
Orders for admission, patient is aware of plan and ready to go upstairs. Any questions, please call ED RN amy at extension [phone]. Patient Covid vaccination status: Unvaccinated     COVID Test Ordered in ED: Rapid SARS-CoV-2 by PCR    COVID Suspicion at Admission: Low clinical suspicion for COVID    Running Infusions:  None    Mental Status/LOC at time of transport: AxO x4    Other pertinent information: Up x1 assist. On 3L NC, not usually on O2 at home.    CIWA score: N/A   NIH score:  N/A

## 2022-05-31 NOTE — ED NOTES
I reviewed the chart and discussed the case with the HENRIQUE. I provided a substantive portion of care for this patient. I personally performed the medical decision making for this encounter in conjunction with HENRIQUE. I have examined the patient and noted tachycardia, tachypnea, hypertensive  Dyspnea, decreased exercise tolerance, orthopnea. 20 year smoker. Said heavy cocaine use  Chest x-ray with significant cardiomegaly and fluid overload state. 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 We will start diuresis and nitroglycerin. Cardiology consulted, admitted for further care  Slightly hypoxic, placed on nasal cannula oxygen. EKG interpretation by me: EKG sinus tachy rhythm at a rate of 108, axis nomal, nonspecific intraventricular conduction delay. No prior to compare    I agree with the following clinical impression(s):  Cardiomyopathy secondary to drug ([address]  (primary encounter diagnosis)  Heart failure, unspecified HF chronicity, unspecified heart failure type ([address]  Acute respiratory failure with hypoxia ([address]

## 2022-06-01 LAB
ANION GAP SERPL CALC-SCNC: 7 MMOL/L (ref 0–18)
BASOPHILS # BLD AUTO: 0.08 X10(3) UL (ref 0–0.2)
BASOPHILS NFR BLD AUTO: 0.9 %
BUN BLD-MCNC: 13 MG/DL (ref 7–18)
CALCIUM BLD-MCNC: 9 MG/DL (ref 8.5–10.1)
CHLORIDE SERPL-SCNC: 107 MMOL/L (ref 98–112)
CO2 SERPL-SCNC: 28 MMOL/L (ref 21–32)
CREAT BLD-MCNC: 0.98 MG/DL
EOSINOPHIL # BLD AUTO: 0.42 X10(3) UL (ref 0–0.7)
EOSINOPHIL NFR BLD AUTO: 5 %
ERYTHROCYTE [DISTWIDTH] IN BLOOD BY AUTOMATED COUNT: 13.6 %
GLUCOSE BLD-MCNC: 131 MG/DL (ref 70–99)
HCT VFR BLD AUTO: 51.8 %
HGB BLD-MCNC: 16.8 G/DL
IMM GRANULOCYTES # BLD AUTO: 0.03 X10(3) UL (ref 0–1)
IMM GRANULOCYTES NFR BLD: 0.4 %
LYMPHOCYTES # BLD AUTO: 1.83 X10(3) UL (ref 1–4)
LYMPHOCYTES NFR BLD AUTO: 21.7 %
MCH RBC QN AUTO: 30.1 PG (ref 26–34)
MCHC RBC AUTO-ENTMCNC: 32.4 G/DL (ref 31–37)
MCV RBC AUTO: 92.8 FL
MONOCYTES # BLD AUTO: 0.57 X10(3) UL (ref 0.1–1)
MONOCYTES NFR BLD AUTO: 6.8 %
NEUTROPHILS # BLD AUTO: 5.51 X10 (3) UL (ref 1.5–7.7)
NEUTROPHILS # BLD AUTO: 5.51 X10(3) UL (ref 1.5–7.7)
NEUTROPHILS NFR BLD AUTO: 65.2 %
OSMOLALITY SERPL CALC.SUM OF ELEC: 296 MOSM/KG (ref 275–295)
PLATELET # BLD AUTO: 223 10(3)UL (ref 150–450)
POTASSIUM SERPL-SCNC: 3.7 MMOL/L (ref 3.5–5.1)
RBC # BLD AUTO: 5.58 X10(6)UL
SODIUM SERPL-SCNC: 142 MMOL/L (ref 136–145)
WBC # BLD AUTO: 8.4 X10(3) UL (ref 4–11)

## 2022-06-01 PROCEDURE — 99232 SBSQ HOSP IP/OBS MODERATE 35: CPT | Performed by: HOSPITALIST

## 2022-06-01 RX ORDER — DIGOXIN 125 MCG
250 TABLET ORAL DAILY
Status: DISCONTINUED | OUTPATIENT
Start: 2022-06-02 | End: 2022-06-02

## 2022-06-01 RX ORDER — CARVEDILOL 6.25 MG/1
6.25 TABLET ORAL 2 TIMES DAILY WITH MEALS
Status: DISCONTINUED | OUTPATIENT
Start: 2022-06-01 | End: 2022-06-02

## 2022-06-01 NOTE — PLAN OF CARE
Assumed pt care at [address]. CIWAS(1-sweaty). A/O X4, lethargic. VSS. Can be restless at noc. 6L O2, bilat diminished w expiratory wheezes. ST on tele. Digoxin Q6. IV lasix. Continent, voids in urinal. S I/O. Denies pain/nausea. Up SBA. Lovenox. E-(non). Cardiac diet, 1.5L fluid restr. R AC IV, c/d/I, flushed, SL. Daily wt. All safety measures active, all needs met, all questions answered. Will cont to monitor. Problem: PAIN - ADULT  Goal: Verbalizes/displays adequate comfort level or patient's stated pain goal  Description: INTERVENTIONS:  - Encourage pt to monitor pain and request assistance  - Assess pain using appropriate pain scale  - Administer analgesics based on type and severity of pain and evaluate response  - Implement non-pharmacological measures as appropriate and evaluate response  - Consider cultural and social influences on pain and pain management  - Manage/alleviate anxiety  - Utilize distraction and/or relaxation techniques  - Monitor for opioid side effects  - Notify MD/LIP if interventions unsuccessful or patient reports new pain  - Anticipate increased pain with activity and pre-medicate as appropriate  Outcome: Progressing     Problem: RISK FOR INFECTION - ADULT  Goal: Absence of fever/infection during anticipated neutropenic period  Description: INTERVENTIONS  - Monitor WBC  - Administer growth factors as ordered  - Implement neutropenic guidelines  Outcome: Progressing     Problem: SAFETY ADULT - FALL  Goal: Free from fall injury  Description: INTERVENTIONS:  - Assess pt frequently for physical needs  - Identify cognitive and physical deficits and behaviors that affect risk of falls.   - [place] fall precautions as indicated by assessment.  - Educate pt/family on patient safety including physical limitations  - Instruct pt to call for assistance with activity based on assessment  - Modify environment to reduce risk of injury  - Provide assistive devices as appropriate  - Consider OT/PT consult to assist with strengthening/mobility  - Encourage toileting schedule  Outcome: Progressing     Problem: DISCHARGE PLANNING  Goal: Discharge to home or other facility with appropriate resources  Description: INTERVENTIONS:  - Identify barriers to discharge w/pt and caregiver  - Include patient/family/discharge partner in discharge planning  - Arrange for needed discharge resources and transportation as appropriate  - Identify discharge learning needs (meds, wound care, etc)  - Arrange for interpreters to assist at discharge as needed  - Consider post-discharge preferences of patient/family/discharge partner  - Complete POLST form as appropriate  - Assess patient's ability to be responsible for managing their own health  - Refer to Case Management Department for coordinating discharge planning if the patient needs post-hospital services based on physician/LIP order or complex needs related to functional status, cognitive ability or social support system  Outcome: Progressing

## 2022-06-01 NOTE — PLAN OF CARE
Problem: PAIN - ADULT  Goal: Verbalizes/displays adequate comfort level or patient's stated pain goal  Description: INTERVENTIONS:  - Encourage pt to monitor pain and request assistance  - Assess pain using appropriate pain scale  - Administer analgesics based on type and severity of pain and evaluate response  - Implement non-pharmacological measures as appropriate and evaluate response  - Consider cultural and social influences on pain and pain management  - Manage/alleviate anxiety  - Utilize distraction and/or relaxation techniques  - Monitor for opioid side effects  - Notify MD/LIP if interventions unsuccessful or patient reports new pain  - Anticipate increased pain with activity and pre-medicate as appropriate  Outcome: Progressing     Problem: RISK FOR INFECTION - ADULT  Goal: Absence of fever/infection during anticipated neutropenic period  Description: INTERVENTIONS  - Monitor WBC  - Administer growth factors as ordered  - Implement neutropenic guidelines  Outcome: Progressing     Problem: SAFETY ADULT - FALL  Goal: Free from fall injury  Description: INTERVENTIONS:  - Assess pt frequently for physical needs  - Identify cognitive and physical deficits and behaviors that affect risk of falls.   - [place] fall precautions as indicated by assessment.  - Educate pt/family on patient safety including physical limitations  - Instruct pt to call for assistance with activity based on assessment  - Modify environment to reduce risk of injury  - Provide assistive devices as appropriate  - Consider OT/PT consult to assist with strengthening/mobility  - Encourage toileting schedule  Outcome: Progressing     Problem: DISCHARGE PLANNING  Goal: Discharge to home or other facility with appropriate resources  Description: INTERVENTIONS:  - Identify barriers to discharge w/pt and caregiver  - Include patient/family/discharge partner in discharge planning  - Arrange for needed discharge resources and transportation as appropriate  - Identify discharge learning needs (meds, wound care, etc)  - Arrange for interpreters to assist at discharge as needed  - Consider post-discharge preferences of patient/family/discharge partner  - Complete POLST form as appropriate  - Assess patient's ability to be responsible for managing their own health  - Refer to Case Management Department for coordinating discharge planning if the patient needs post-hospital services based on physician/LIP order or complex needs related to functional status, cognitive ability or social support system  Outcome: Progressing

## 2022-06-01 NOTE — CARDIAC REHAB
CHF teaching complete with pt and wife. New diagnosis and could benefit from repetition and check back for questions Binder given smoking cessation information provided and pt on scale of 10 readiness to quit. Cardiac rehab program explained But needs further information about qualifying and locations that take his [address] Medicaid.

## 2022-06-02 VITALS
DIASTOLIC BLOOD PRESSURE: 91 MMHG | BODY MASS INDEX: 35.21 KG/M2 | OXYGEN SATURATION: 91 % | SYSTOLIC BLOOD PRESSURE: 128 MMHG | TEMPERATURE: 97 F | HEIGHT: 72 IN | WEIGHT: 260 LBS | HEART RATE: 87 BPM | RESPIRATION RATE: 30 BRPM

## 2022-06-02 LAB
CHOLEST SERPL-MCNC: 196 MG/DL (ref ?–200)
HDLC SERPL-MCNC: 38 MG/DL (ref 40–59)
LDLC SERPL CALC-MCNC: 125 MG/DL (ref ?–100)
NONHDLC SERPL-MCNC: 158 MG/DL (ref ?–130)
TRIGL SERPL-MCNC: 183 MG/DL (ref 30–149)
VLDLC SERPL CALC-MCNC: 33 MG/DL (ref 0–30)

## 2022-06-02 PROCEDURE — 99232 SBSQ HOSP IP/OBS MODERATE 35: CPT | Performed by: HOSPITALIST

## 2022-06-02 PROCEDURE — 99239 HOSP IP/OBS DSCHRG MGMT >30: CPT | Performed by: HOSPITALIST

## 2022-06-02 RX ORDER — CARVEDILOL 12.5 MG/1
12.5 TABLET ORAL 2 TIMES DAILY WITH MEALS
Qty: 60 TABLET | Refills: 5 | Status: SHIPPED | OUTPATIENT
Start: 2022-06-02

## 2022-06-02 RX ORDER — CARVEDILOL 12.5 MG/1
12.5 TABLET ORAL 2 TIMES DAILY WITH MEALS
Status: DISCONTINUED | OUTPATIENT
Start: 2022-06-02 | End: 2022-06-02

## 2022-06-02 RX ORDER — DIGOXIN 125 MCG
125 TABLET ORAL DAILY
Qty: 30 TABLET | Refills: 0 | Status: SHIPPED | OUTPATIENT
Start: 2022-06-03

## 2022-06-02 RX ORDER — LOSARTAN POTASSIUM 100 MG/1
100 TABLET ORAL DAILY
Qty: 30 TABLET | Refills: 5 | Status: SHIPPED | OUTPATIENT
Start: 2022-06-03

## 2022-06-02 RX ORDER — TORSEMIDE 20 MG/1
20 TABLET ORAL 2 TIMES DAILY
Qty: 60 TABLET | Refills: 0 | Status: SHIPPED | OUTPATIENT
Start: 2022-06-02

## 2022-06-02 RX ORDER — SPIRONOLACTONE 25 MG/1
12.5 TABLET ORAL DAILY
Qty: 30 TABLET | Refills: 5 | Status: SHIPPED | OUTPATIENT
Start: 2022-06-02

## 2022-06-02 RX ORDER — SPIRONOLACTONE 25 MG/1
12.5 TABLET ORAL DAILY
Status: DISCONTINUED | OUTPATIENT
Start: 2022-06-02 | End: 2022-06-02

## 2022-06-02 RX ORDER — DIGOXIN 125 MCG
125 TABLET ORAL DAILY
Status: DISCONTINUED | OUTPATIENT
Start: 2022-06-03 | End: 2022-06-02

## 2022-06-02 NOTE — CARDIAC REHAB
Provided review of heart failure education to patient and wife  Provided information regarding cardiac rehab  Patient referred to cardiac rehab pending insurance approval

## 2022-06-02 NOTE — CM/SW NOTE
10:40am  MSW messaged APN about MD note \"need EP input regarding LifeVest at discharge\", APN updated MSW that no official Life Vest ordered at this time.  MSW will follow

## 2022-06-02 NOTE — PLAN OF CARE
Problem: PAIN - ADULT  Goal: Verbalizes/displays adequate comfort level or patient's stated pain goal  Description: INTERVENTIONS:  - Encourage pt to monitor pain and request assistance  - Assess pain using appropriate pain scale  - Administer analgesics based on type and severity of pain and evaluate response  - Implement non-pharmacological measures as appropriate and evaluate response  - Consider cultural and social influences on pain and pain management  - Manage/alleviate anxiety  - Utilize distraction and/or relaxation techniques  - Monitor for opioid side effects  - Notify MD/LIP if interventions unsuccessful or patient reports new pain  - Anticipate increased pain with activity and pre-medicate as appropriate  Outcome: Progressing     Problem: RISK FOR INFECTION - ADULT  Goal: Absence of fever/infection during anticipated neutropenic period  Description: INTERVENTIONS  - Monitor WBC  - Administer growth factors as ordered  - Implement neutropenic guidelines  Outcome: Progressing     Problem: SAFETY ADULT - FALL  Goal: Free from fall injury  Description: INTERVENTIONS:  - Assess pt frequently for physical needs  - Identify cognitive and physical deficits and behaviors that affect risk of falls.   - [place] fall precautions as indicated by assessment.  - Educate pt/family on patient safety including physical limitations  - Instruct pt to call for assistance with activity based on assessment  - Modify environment to reduce risk of injury  - Provide assistive devices as appropriate  - Consider OT/PT consult to assist with strengthening/mobility  - Encourage toileting schedule  Outcome: Progressing     Problem: DISCHARGE PLANNING  Goal: Discharge to home or other facility with appropriate resources  Description: INTERVENTIONS:  - Identify barriers to discharge w/pt and caregiver  - Include patient/family/discharge partner in discharge planning  - Arrange for needed discharge resources and transportation as appropriate  - Identify discharge learning needs (meds, wound care, etc)  - Arrange for interpreters to assist at discharge as needed  - Consider post-discharge preferences of patient/family/discharge partner  - Complete POLST form as appropriate  - Assess patient's ability to be responsible for managing their own health  - Refer to Case Management Department for coordinating discharge planning if the patient needs post-hospital services based on physician/LIP order or complex needs related to functional status, cognitive ability or social support system  Outcome: Progressing

## 2022-06-02 NOTE — PROGRESS NOTES
Assumed care at [address] [place] [address] [address]  Denies pain or discomfort. On room air while awake, 3L non-rebreathe mask while asleep to maintain O2sats 90%. CIWA scores stable-no indications to medicate per protocol. VSS. NSR on Tele.   1.5L Fluid restriction

## 2022-06-02 NOTE — PLAN OF CARE
Received call from Jacqueline Penn, pt approved for Lifevest. Will come fit pt for a vest in the next few hours. Pt updated. stated

## 2022-06-03 ENCOUNTER — PATIENT OUTREACH (OUTPATIENT)
Dept: CASE MANAGEMENT | Age: 42
End: 2022-06-03

## 2022-06-03 ENCOUNTER — TELEMEDICINE (OUTPATIENT)
Dept: INTERNAL MEDICINE CLINIC | Facility: CLINIC | Age: 42
End: 2022-06-03

## 2022-06-03 DIAGNOSIS — I42.8 OTHER CARDIOMYOPATHY (HCC): ICD-10-CM

## 2022-06-03 DIAGNOSIS — I42.7 CARDIOMYOPATHY SECONDARY TO DRUG (HCC): ICD-10-CM

## 2022-06-03 DIAGNOSIS — F10.20 UNCOMPLICATED ALCOHOL DEPENDENCE (HCC): Chronic | ICD-10-CM

## 2022-06-03 DIAGNOSIS — F14.10 COCAINE ABUSE (HCC): Chronic | ICD-10-CM

## 2022-06-03 DIAGNOSIS — F12.20 MARIJUANA DEPENDENCE (HCC): Chronic | ICD-10-CM

## 2022-06-03 DIAGNOSIS — I50.21 CHF (CONGESTIVE HEART FAILURE), NYHA CLASS IV, ACUTE, SYSTOLIC (HCC): Primary | ICD-10-CM

## 2022-06-03 DIAGNOSIS — F17.200 TOBACCO DEPENDENCE: ICD-10-CM

## 2022-06-03 PROCEDURE — 99215 OFFICE O/P EST HI 40 MIN: CPT | Performed by: NURSE PRACTITIONER

## 2022-06-03 RX ORDER — MEDICAL SUPPLY, MISCELLANEOUS
EACH MISCELLANEOUS
Qty: 1 EACH | Refills: 0 | Status: SHIPPED | OUTPATIENT
Start: 2022-06-03

## 2022-06-09 PROBLEM — F17.200 TOBACCO DEPENDENCE: Status: ACTIVE | Noted: 2022-06-09

## 2022-07-21 ENCOUNTER — TELEPHONE (OUTPATIENT)
Dept: INTERNAL MEDICINE CLINIC | Facility: CLINIC | Age: 42
End: 2022-07-21

## 2022-10-27 NOTE — ED QUICK NOTES
AWAITING BED ASSIGNMENT. PT STATES HIS PAIN IS ESCALATING.
PT GOING TO ROOM 0017, REPORTED TO MIKAYLA BLANCHARD.
patient

## 2022-11-08 NOTE — H&P
Seen and examined. Agree with OP H and P from 10/31 scanned above. Newly diagnosed dilated CM with severe LV dysfunction, compensated on medical therapy. R/L HC today to evaluate hemodynamics, ventricular and valvular function and coronary anatomy. Procedure, indications, R/B/A reviewed with pt and wife. They verbalize good understanding and with to proceed. Increased but acceptable risk for IV conscious sedation.     Claudene Ehlers MD

## 2022-11-16 ENCOUNTER — ORDER TRANSCRIPTION (OUTPATIENT)
Dept: ADMINISTRATIVE | Facility: HOSPITAL | Age: 42
End: 2022-11-16

## 2022-11-16 DIAGNOSIS — Z20.822 ENCOUNTER FOR PREPROCEDURE SCREENING LABORATORY TESTING FOR COVID-19: Primary | ICD-10-CM

## 2022-11-16 DIAGNOSIS — Z01.812 ENCOUNTER FOR PREPROCEDURE SCREENING LABORATORY TESTING FOR COVID-19: Primary | ICD-10-CM

## 2022-11-19 ENCOUNTER — LAB ENCOUNTER (OUTPATIENT)
Dept: LAB | Facility: HOSPITAL | Age: 42
End: 2022-11-19
Attending: INTERNAL MEDICINE
Payer: MEDICAID

## 2022-11-19 ENCOUNTER — HOSPITAL ENCOUNTER (OUTPATIENT)
Dept: GENERAL RADIOLOGY | Facility: HOSPITAL | Age: 42
Discharge: HOME | End: 2022-11-19
Attending: INTERNAL MEDICINE
Payer: MEDICAID

## 2022-11-19 DIAGNOSIS — Z01.818 PRE-OP EXAM: Primary | ICD-10-CM

## 2022-11-19 DIAGNOSIS — I50.9 CHF (CONGESTIVE HEART FAILURE) (HCC): ICD-10-CM

## 2022-11-19 DIAGNOSIS — Z01.812 PRE-OPERATIVE LABORATORY EXAMINATION: ICD-10-CM

## 2022-11-19 DIAGNOSIS — Z01.812 ENCOUNTER FOR PREPROCEDURE SCREENING LABORATORY TESTING FOR COVID-19: ICD-10-CM

## 2022-11-19 DIAGNOSIS — Z20.822 ENCOUNTER FOR PREPROCEDURE SCREENING LABORATORY TESTING FOR COVID-19: ICD-10-CM

## 2022-11-19 LAB
ANION GAP SERPL CALC-SCNC: 8 MMOL/L (ref 0–18)
ATRIAL RATE: 89 BPM
BASOPHILS # BLD AUTO: 0.12 X10(3) UL (ref 0–0.2)
BASOPHILS NFR BLD AUTO: 1.2 %
BUN BLD-MCNC: 30 MG/DL (ref 7–18)
BUN/CREAT SERPL: 23.1 (ref 10–20)
CALCIUM BLD-MCNC: 9.2 MG/DL (ref 8.5–10.1)
CHLORIDE SERPL-SCNC: 99 MMOL/L (ref 98–112)
CO2 SERPL-SCNC: 30 MMOL/L (ref 21–32)
CREAT BLD-MCNC: 1.3 MG/DL
DEPRECATED RDW RBC AUTO: 44.7 FL (ref 35.1–46.3)
EOSINOPHIL # BLD AUTO: 0.6 X10(3) UL (ref 0–0.7)
EOSINOPHIL NFR BLD AUTO: 6 %
ERYTHROCYTE [DISTWIDTH] IN BLOOD BY AUTOMATED COUNT: 12.6 % (ref 11–15)
FASTING STATUS PATIENT QL REPORTED: YES
GFR SERPLBLD BASED ON 1.73 SQ M-ARVRAT: 70 ML/MIN/1.73M2 (ref 60–?)
GLUCOSE BLD-MCNC: 150 MG/DL (ref 70–99)
HCT VFR BLD AUTO: 50.8 %
HGB BLD-MCNC: 16.6 G/DL
IMM GRANULOCYTES # BLD AUTO: 0.05 X10(3) UL (ref 0–1)
IMM GRANULOCYTES NFR BLD: 0.5 %
LYMPHOCYTES # BLD AUTO: 3.25 X10(3) UL (ref 1–4)
LYMPHOCYTES NFR BLD AUTO: 32.5 %
MCH RBC QN AUTO: 31.3 PG (ref 26–34)
MCHC RBC AUTO-ENTMCNC: 32.7 G/DL (ref 31–37)
MCV RBC AUTO: 95.7 FL
MONOCYTES # BLD AUTO: 0.77 X10(3) UL (ref 0.1–1)
MONOCYTES NFR BLD AUTO: 7.7 %
NEUTROPHILS # BLD AUTO: 5.22 X10 (3) UL (ref 1.5–7.7)
NEUTROPHILS # BLD AUTO: 5.22 X10(3) UL (ref 1.5–7.7)
NEUTROPHILS NFR BLD AUTO: 52.1 %
OSMOLALITY SERPL CALC.SUM OF ELEC: 293 MOSM/KG (ref 275–295)
P AXIS: 12 DEGREES
P-R INTERVAL: 164 MS
PLATELET # BLD AUTO: 294 10(3)UL (ref 150–450)
POTASSIUM SERPL-SCNC: 3.6 MMOL/L (ref 3.5–5.1)
Q-T INTERVAL: 416 MS
QRS DURATION: 116 MS
QTC CALCULATION (BEZET): 506 MS
R AXIS: -32 DEGREES
RBC # BLD AUTO: 5.31 X10(6)UL
SODIUM SERPL-SCNC: 137 MMOL/L (ref 136–145)
T AXIS: 15 DEGREES
VENTRICULAR RATE: 89 BPM
WBC # BLD AUTO: 10 X10(3) UL (ref 4–11)

## 2022-11-19 PROCEDURE — 36415 COLL VENOUS BLD VENIPUNCTURE: CPT

## 2022-11-19 PROCEDURE — 93005 ELECTROCARDIOGRAM TRACING: CPT

## 2022-11-19 PROCEDURE — 93010 ELECTROCARDIOGRAM REPORT: CPT | Performed by: INTERNAL MEDICINE

## 2022-11-19 PROCEDURE — 71046 X-RAY EXAM CHEST 2 VIEWS: CPT | Performed by: INTERNAL MEDICINE

## 2022-11-19 PROCEDURE — 80048 BASIC METABOLIC PNL TOTAL CA: CPT

## 2022-11-19 PROCEDURE — 85025 COMPLETE CBC W/AUTO DIFF WBC: CPT

## 2022-11-20 LAB — SARS-COV-2 RNA RESP QL NAA+PROBE: NOT DETECTED

## 2022-11-22 ENCOUNTER — HOSPITAL ENCOUNTER (OUTPATIENT)
Dept: INTERVENTIONAL RADIOLOGY/VASCULAR | Facility: HOSPITAL | Age: 42
Discharge: HOME | End: 2022-11-22
Attending: INTERNAL MEDICINE | Admitting: INTERNAL MEDICINE
Payer: MEDICAID

## 2022-11-22 VITALS
HEART RATE: 85 BPM | HEIGHT: 72 IN | BODY MASS INDEX: 35.89 KG/M2 | OXYGEN SATURATION: 96 % | RESPIRATION RATE: 24 BRPM | WEIGHT: 265 LBS | DIASTOLIC BLOOD PRESSURE: 63 MMHG | TEMPERATURE: 97 F | SYSTOLIC BLOOD PRESSURE: 118 MMHG

## 2022-11-22 DIAGNOSIS — I42.0 DILATED CARDIOMYOPATHY (HCC): ICD-10-CM

## 2022-11-22 DIAGNOSIS — I50.9 CHF (CONGESTIVE HEART FAILURE) (HCC): Primary | ICD-10-CM

## 2022-11-22 LAB
ATRIAL RATE: 79 BPM
P AXIS: 14 DEGREES
P-R INTERVAL: 162 MS
Q-T INTERVAL: 414 MS
QRS DURATION: 128 MS
QTC CALCULATION (BEZET): 474 MS
R AXIS: -31 DEGREES
T AXIS: 4 DEGREES
VENTRICULAR RATE: 79 BPM

## 2022-11-22 PROCEDURE — 99152 MOD SED SAME PHYS/QHP 5/>YRS: CPT | Performed by: INTERNAL MEDICINE

## 2022-11-22 PROCEDURE — B2141ZZ FLUOROSCOPY OF RIGHT HEART USING LOW OSMOLAR CONTRAST: ICD-10-PCS | Performed by: INTERNAL MEDICINE

## 2022-11-22 PROCEDURE — 99153 MOD SED SAME PHYS/QHP EA: CPT | Performed by: INTERNAL MEDICINE

## 2022-11-22 PROCEDURE — B2151ZZ FLUOROSCOPY OF LEFT HEART USING LOW OSMOLAR CONTRAST: ICD-10-PCS | Performed by: INTERNAL MEDICINE

## 2022-11-22 PROCEDURE — 4A023N8 MEASUREMENT OF CARDIAC SAMPLING AND PRESSURE, BILATERAL, PERCUTANEOUS APPROACH: ICD-10-PCS | Performed by: INTERNAL MEDICINE

## 2022-11-22 PROCEDURE — B2111ZZ FLUOROSCOPY OF MULTIPLE CORONARY ARTERIES USING LOW OSMOLAR CONTRAST: ICD-10-PCS | Performed by: INTERNAL MEDICINE

## 2022-11-22 PROCEDURE — 93005 ELECTROCARDIOGRAM TRACING: CPT

## 2022-11-22 PROCEDURE — 93010 ELECTROCARDIOGRAM REPORT: CPT | Performed by: INTERNAL MEDICINE

## 2022-11-22 PROCEDURE — 93460 R&L HRT ART/VENTRICLE ANGIO: CPT | Performed by: INTERNAL MEDICINE

## 2022-11-22 RX ORDER — HEPARIN SODIUM 5000 [USP'U]/ML
INJECTION, SOLUTION INTRAVENOUS; SUBCUTANEOUS
Status: COMPLETED
Start: 2022-11-22 | End: 2022-11-22

## 2022-11-22 RX ORDER — SODIUM CHLORIDE 9 MG/ML
INJECTION, SOLUTION INTRAVENOUS
Status: COMPLETED | OUTPATIENT
Start: 2022-11-22 | End: 2022-11-22

## 2022-11-22 RX ORDER — CARVEDILOL 25 MG/1
25 TABLET ORAL 2 TIMES DAILY WITH MEALS
Qty: 60 TABLET | Refills: 3 | Status: SHIPPED | OUTPATIENT
Start: 2022-11-22

## 2022-11-22 RX ORDER — CARVEDILOL 12.5 MG/1
25 TABLET ORAL 2 TIMES DAILY WITH MEALS
Qty: 120 TABLET | Refills: 3 | Status: SHIPPED | OUTPATIENT
Start: 2022-11-22 | End: 2022-11-22

## 2022-11-22 RX ORDER — LIDOCAINE HYDROCHLORIDE 10 MG/ML
INJECTION, SOLUTION EPIDURAL; INFILTRATION; INTRACAUDAL; PERINEURAL
Status: COMPLETED
Start: 2022-11-22 | End: 2022-11-22

## 2022-11-22 RX ORDER — IODIXANOL 320 MG/ML
100 INJECTION, SOLUTION INTRAVASCULAR
Status: DISCONTINUED | OUTPATIENT
Start: 2022-11-22 | End: 2022-11-22

## 2022-11-22 RX ORDER — MIDAZOLAM HYDROCHLORIDE 1 MG/ML
INJECTION INTRAMUSCULAR; INTRAVENOUS
Status: COMPLETED
Start: 2022-11-22 | End: 2022-11-22

## 2022-11-22 RX ORDER — ASPIRIN 81 MG/1
324 TABLET, CHEWABLE ORAL ONCE
Status: COMPLETED | OUTPATIENT
Start: 2022-11-22 | End: 2022-11-22

## 2022-11-22 RX ORDER — SPIRONOLACTONE 25 MG/1
25 TABLET ORAL DAILY
Qty: 30 TABLET | Refills: 3 | Status: SHIPPED | OUTPATIENT
Start: 2022-11-22

## 2022-11-22 RX ORDER — ASPIRIN 81 MG/1
TABLET, CHEWABLE ORAL
Status: COMPLETED
Start: 2022-11-22 | End: 2022-11-22

## 2022-11-22 RX ADMIN — SODIUM CHLORIDE: 9 INJECTION, SOLUTION INTRAVENOUS at 09:35:00

## 2022-11-22 RX ADMIN — ASPIRIN 324 MG: 81 TABLET, CHEWABLE ORAL at 09:35:00

## 2022-11-22 NOTE — PROGRESS NOTES
Pt received s/p right and left heart cath with Dr. Marcel Salazar. Right femoral arterial access site closed with angioseal. Right femoral venous access site closed with manual pressure. Dressing CDI, no bleeding/oozing, swelling or hematoma present. Dr. Marcel Salazar at bedside and spoke with pt and pt's wife. EKG done. Recovery completed. Discharge instructions given to pt and pt's wife. IV discontinued. Pt had slight oozing from venous access site after getting dressed. Manual pressure held for 10 minutes. Site CDI, no oozing/bleeding or hematoma present. Quick clot dressing applied over site. Pt taken down to [facility] via wheelchair for discharge. Pt's wife driving pt home.

## 2022-11-22 NOTE — PROGRESS NOTES
[address] Cardiology  [facility]  Cath Note    Carlos Khanna Location: Cath lab     MRN [id number]   Admission Date 11/22/2022 Operation Date 11/22/2022   Attending Physician Adonis Morgan MD Operating Physician Bob Jessica MD     Pre-Cath Diagnosis: Dilated cardiomyopathy. Severe LV dysfunction. Post-Cath Diagnosis: Same as above    Procedure Performed: Right heart cath, left heart cath, LV and coronary angiography. RFA N/V with 6 Guyanese Angio-Seal for the artery. Conscious sedation: Versed 2 mg, Fentanyl 50 mcg, 4660-7976. Summary of Case: Snoring respirations with light conscious sedation suggestive of obstructive sleep apnea. Minimally elevated pulmonary pressures with elevated RA (18) and PCWP (15). Cardiac output 4.8 with cardiac index 2 and PVR 2.1 Wood units. Dilated LV with severely reduced LV systolic function. Right dominant coronary circulation. No obstructive epicardial CAD. Continue medical therapy. EP evaluation for ICD/CRT. Again reinforced need for outpatient sleep study and treatment of suspected sleep apnea. Full report to follow.     Bob Jessica MD  11/22/2022  11:03 AM

## 2022-11-23 NOTE — PROCEDURES
[facility]    PATIENT'S NAME: Daniela Pastrana   ATTENDING PHYSICIAN: Martha Sierra M.D. OPERATING PHYSICIAN: Martha Sierra M.D. PATIENT ACCOUNT#:   [de-identified]    LOCATION:  [address]  MEDICAL RECORD #:   [id number]       YOB: 1980  ADMISSION DATE:       11/22/2022      OPERATION DATE:  11/22/2022    CARDIAC PROCEDURE TRANSCRIPTION      CARDIAC CATHETERIZATION     PREOPERATIVE DIAGNOSIS:  Dilated cardiomyopathy. Left ventricular systolic heart failure. POSTOPERATIVE DIAGNOSIS:  Dilated cardiomyopathy. Left ventricular systolic heart failure. PROCEDURE PERFORMED:  IV conscious sedation. Right heart catheterization, left heart catheterization, left ventriculography, and selective coronary angiography. INDICATION FOR PROCEDURE:  Evaluate hemodynamics, ventricular function, and coronary anatomy. SEDATION:  IV conscious sedation for the procedure consisted of Versed 2 mg and fentanyl 50 mcg. Conscious sedation monitoring time 1020 to 1046. During light conscious sedation, the patient developed deep snoring respirations with transient hypoxia, strongly suggestive of obstructive sleep apnea. No abnormal coronary calcifications are noted. PROCEDURAL COMMENTS:  Right heart catheterization, left heart catheterization, left ventriculography, and selective coronary angiography were performed via the right femoral artery and vein. A 7-Slovenian PWP catheter was used for venous/right-sided pressure determinations. A 6-Slovenian pigtail, JL4 and JR4 catheters were used for the arterial system. Following completion of the diagnostic procedure, the introducer sheaths were removed and adequate hemostasis obtained with manual pressure to the puncture sites, as well as use of a 6-Slovenian Angio-Seal arterial closure device. The patient tolerated the procedure well, with no immediate complications. HEMODYNAMICS:  The mean right atrial pressure is 15 mmHg.   Right ventricular systolic pressure is 34. Pulmonary artery pressure is 34/15, with a mean of 25. Mean pulmonary capillary wedge pressure is 15. Aortic pressure is 120/90, with a mean of 100. The LVEDP is 15 to 20. No gradient is noted across the aortic valve on pullback. Based on room air oxygen saturations of 91% in the aorta and 60% in the pulmonary artery, the calculated cardiac output via the Ashley method is 4.8 L/minute, with a cardiac index of 2. Pulmonary vascular resistance is 2.1 Wood units. LEFT VENTRICULOGRAPHY:  Moderate ectopy is noted during the contrast injection. The left ventricle is enlarged. Left ventricular systolic function appears moderately to severely reduced. The visually estimated LVEF using a post-PVC beat is 25 mmHg to 30 mmHg. No significant mitral regurgitation is noted. CORONARY ANGIOGRAPHY:  The left main coronary artery appears normal.  It bifurcates into left anterior descending and circumflex coronary distributions. The left coronary artery and its branches appear free of significant obstructive epicardial CAD. The right coronary artery is large and dominant. The right coronary artery and its branches appear free of significant obstructive epicardial CAD. CONCLUSIONS:    1.   Mildly elevated right-sided filling pressures with upper normal to mildly elevated pulmonary artery pressures and a mildly elevated left ventricular end-diastolic pressure. 2.   Moderate to severe global left ventricular systolic function. 3.   Right dominant coronary artery circulation, without evidence for significant obstructive epicardial coronary artery disease. RECOMMENDATIONS:  Continue medical therapy. Electrophysiology evaluation for pacemaker/defibrillator given persistent left ventricular dysfunction. Outpatient pulmonary medicine evaluation and sleep apnea evaluation/treatment.     Dictated By Lizbeth Hernandez M.D.  d: 11/22/2022 18:27:39  t: 11/22/2022 19:24:21  Job [id number]  /

## 2023-09-28 ENCOUNTER — APPOINTMENT (OUTPATIENT)
Dept: GENERAL RADIOLOGY | Facility: HOSPITAL | Age: 43
End: 2023-09-28
Attending: EMERGENCY MEDICINE
Payer: MEDICAID

## 2023-09-28 ENCOUNTER — HOSPITAL ENCOUNTER (OUTPATIENT)
Facility: HOSPITAL | Age: 43
Setting detail: OBSERVATION
Discharge: HOME | End: 2023-09-29
Attending: EMERGENCY MEDICINE | Admitting: HOSPITALIST
Payer: MEDICAID

## 2023-09-28 DIAGNOSIS — I42.0 DILATED CARDIOMYOPATHY (HCC): ICD-10-CM

## 2023-09-28 DIAGNOSIS — R06.09 EXERTIONAL DYSPNEA: Primary | ICD-10-CM

## 2023-09-28 PROBLEM — I50.20 HFREF (HEART FAILURE WITH REDUCED EJECTION FRACTION) (HCC): Status: ACTIVE | Noted: 2022-05-31

## 2023-09-28 LAB
ALBUMIN SERPL-MCNC: 3.2 G/DL (ref 3.4–5)
ALBUMIN/GLOB SERPL: 0.7 {RATIO} (ref 1–2)
ALP LIVER SERPL-CCNC: 68 U/L
ALT SERPL-CCNC: 81 U/L
ANION GAP SERPL CALC-SCNC: 8 MMOL/L (ref 0–18)
APTT PPP: 26.2 SECONDS (ref 23.3–35.6)
AST SERPL-CCNC: 44 U/L (ref 15–37)
ATRIAL RATE: 101 BPM
BASOPHILS # BLD AUTO: 0.06 X10(3) UL (ref 0–0.2)
BASOPHILS NFR BLD AUTO: 0.6 %
BILIRUB SERPL-MCNC: 0.8 MG/DL (ref 0.1–2)
BUN BLD-MCNC: 10 MG/DL (ref 7–18)
CALCIUM BLD-MCNC: 8.9 MG/DL (ref 8.5–10.1)
CHLORIDE SERPL-SCNC: 109 MMOL/L (ref 98–112)
CO2 SERPL-SCNC: 24 MMOL/L (ref 21–32)
CREAT BLD-MCNC: 0.95 MG/DL
D DIMER PPP FEU-MCNC: 0.46 UG/ML FEU (ref ?–0.5)
EGFRCR SERPLBLD CKD-EPI 2021: 102 ML/MIN/1.73M2 (ref 60–?)
EOSINOPHIL # BLD AUTO: 0.27 X10(3) UL (ref 0–0.7)
EOSINOPHIL NFR BLD AUTO: 2.9 %
ERYTHROCYTE [DISTWIDTH] IN BLOOD BY AUTOMATED COUNT: 12.9 %
EST. AVERAGE GLUCOSE BLD GHB EST-MCNC: 131 MG/DL (ref 68–126)
GLOBULIN PLAS-MCNC: 4.4 G/DL (ref 2.8–4.4)
GLUCOSE BLD-MCNC: 178 MG/DL (ref 70–99)
HAV IGM SER QL: NONREACTIVE
HBA1C MFR BLD: 6.2 % (ref ?–5.7)
HBV CORE IGM SER QL: NONREACTIVE
HBV SURFACE AG SERPL QL IA: NONREACTIVE
HCT VFR BLD AUTO: 48.3 %
HCV AB SERPL QL IA: NONREACTIVE
HGB BLD-MCNC: 16.2 G/DL
IMM GRANULOCYTES # BLD AUTO: 0.03 X10(3) UL (ref 0–1)
IMM GRANULOCYTES NFR BLD: 0.3 %
INR BLD: 1.04 (ref 0.85–1.16)
LYMPHOCYTES # BLD AUTO: 1.99 X10(3) UL (ref 1–4)
LYMPHOCYTES NFR BLD AUTO: 21.5 %
MCH RBC QN AUTO: 30.4 PG (ref 26–34)
MCHC RBC AUTO-ENTMCNC: 33.5 G/DL (ref 31–37)
MCV RBC AUTO: 90.6 FL
MONOCYTES # BLD AUTO: 0.37 X10(3) UL (ref 0.1–1)
MONOCYTES NFR BLD AUTO: 4 %
NEUTROPHILS # BLD AUTO: 6.52 X10 (3) UL (ref 1.5–7.7)
NEUTROPHILS # BLD AUTO: 6.52 X10(3) UL (ref 1.5–7.7)
NEUTROPHILS NFR BLD AUTO: 70.7 %
NT-PROBNP SERPL-MCNC: 806 PG/ML (ref ?–125)
OSMOLALITY SERPL CALC.SUM OF ELEC: 295 MOSM/KG (ref 275–295)
P AXIS: 24 DEGREES
P-R INTERVAL: 156 MS
PLATELET # BLD AUTO: 221 10(3)UL (ref 150–450)
POTASSIUM SERPL-SCNC: 3.9 MMOL/L (ref 3.5–5.1)
PROT SERPL-MCNC: 7.6 G/DL (ref 6.4–8.2)
PROTHROMBIN TIME: 13.6 SECONDS (ref 11.6–14.8)
Q-T INTERVAL: 390 MS
QRS DURATION: 128 MS
QTC CALCULATION (BEZET): 505 MS
R AXIS: -18 DEGREES
RBC # BLD AUTO: 5.33 X10(6)UL
SARS-COV-2 RNA RESP QL NAA+PROBE: NOT DETECTED
SODIUM SERPL-SCNC: 141 MMOL/L (ref 136–145)
T AXIS: 88 DEGREES
TROPONIN I HIGH SENSITIVITY: 44 NG/L
VENTRICULAR RATE: 101 BPM
WBC # BLD AUTO: 9.2 X10(3) UL (ref 4–11)

## 2023-09-28 PROCEDURE — 71045 X-RAY EXAM CHEST 1 VIEW: CPT | Performed by: EMERGENCY MEDICINE

## 2023-09-28 PROCEDURE — 99223 1ST HOSP IP/OBS HIGH 75: CPT | Performed by: HOSPITALIST

## 2023-09-28 RX ORDER — SPIRONOLACTONE 25 MG/1
25 TABLET ORAL DAILY
Status: DISCONTINUED | OUTPATIENT
Start: 2023-09-28 | End: 2023-09-29

## 2023-09-28 RX ORDER — ECHINACEA PURPUREA EXTRACT 125 MG
1 TABLET ORAL
Status: DISCONTINUED | OUTPATIENT
Start: 2023-09-28 | End: 2023-09-29

## 2023-09-28 RX ORDER — PROCHLORPERAZINE EDISYLATE 5 MG/ML
5 INJECTION INTRAMUSCULAR; INTRAVENOUS EVERY 8 HOURS PRN
Status: DISCONTINUED | OUTPATIENT
Start: 2023-09-28 | End: 2023-09-29

## 2023-09-28 RX ORDER — FUROSEMIDE 10 MG/ML
60 INJECTION INTRAMUSCULAR; INTRAVENOUS ONCE
Status: COMPLETED | OUTPATIENT
Start: 2023-09-28 | End: 2023-09-28

## 2023-09-28 RX ORDER — LOSARTAN POTASSIUM 100 MG/1
100 TABLET ORAL DAILY
Status: DISCONTINUED | OUTPATIENT
Start: 2023-09-28 | End: 2023-09-29

## 2023-09-28 RX ORDER — FUROSEMIDE 10 MG/ML
40 INJECTION INTRAMUSCULAR; INTRAVENOUS
Status: CANCELLED | OUTPATIENT
Start: 2023-09-28

## 2023-09-28 RX ORDER — BISACODYL 10 MG
10 SUPPOSITORY, RECTAL RECTAL
Status: DISCONTINUED | OUTPATIENT
Start: 2023-09-28 | End: 2023-09-29

## 2023-09-28 RX ORDER — CARVEDILOL 12.5 MG/1
25 TABLET ORAL 2 TIMES DAILY WITH MEALS
Status: DISCONTINUED | OUTPATIENT
Start: 2023-09-29 | End: 2023-09-29

## 2023-09-28 RX ORDER — SENNOSIDES 8.6 MG
17.2 TABLET ORAL NIGHTLY PRN
Status: DISCONTINUED | OUTPATIENT
Start: 2023-09-28 | End: 2023-09-29

## 2023-09-28 RX ORDER — ONDANSETRON 2 MG/ML
4 INJECTION INTRAMUSCULAR; INTRAVENOUS EVERY 6 HOURS PRN
Status: DISCONTINUED | OUTPATIENT
Start: 2023-09-28 | End: 2023-09-29

## 2023-09-28 RX ORDER — MELATONIN
3 NIGHTLY PRN
Status: DISCONTINUED | OUTPATIENT
Start: 2023-09-28 | End: 2023-09-29

## 2023-09-28 RX ORDER — POLYETHYLENE GLYCOL 3350 17 G/17G
17 POWDER, FOR SOLUTION ORAL DAILY PRN
Status: DISCONTINUED | OUTPATIENT
Start: 2023-09-28 | End: 2023-09-29

## 2023-09-28 RX ORDER — ENOXAPARIN SODIUM 100 MG/ML
40 INJECTION SUBCUTANEOUS DAILY
Status: DISCONTINUED | OUTPATIENT
Start: 2023-09-29 | End: 2023-09-29

## 2023-09-28 RX ORDER — ENEMA 19; 7 G/133ML; G/133ML
1 ENEMA RECTAL ONCE AS NEEDED
Status: DISCONTINUED | OUTPATIENT
Start: 2023-09-28 | End: 2023-09-29

## 2023-09-28 RX ORDER — CARVEDILOL 12.5 MG/1
25 TABLET ORAL 2 TIMES DAILY WITH MEALS
Status: DISCONTINUED | OUTPATIENT
Start: 2023-09-28 | End: 2023-09-28 | Stop reason: HOSPADM

## 2023-09-28 RX ORDER — ACETAMINOPHEN 500 MG
1000 TABLET ORAL EVERY 4 HOURS PRN
Status: DISCONTINUED | OUTPATIENT
Start: 2023-09-28 | End: 2023-09-29

## 2023-09-28 RX ORDER — TORSEMIDE 20 MG/1
20 TABLET ORAL DAILY
Status: DISCONTINUED | OUTPATIENT
Start: 2023-09-29 | End: 2023-09-29

## 2023-09-28 NOTE — ED QUICK NOTES
Orders for admission, patient is aware of plan and ready to go upstairs. Any questions, please call ED RN Enedina Councilman at extension [phone].      Patient Covid vaccination status: Unvaccinated     COVID Test Ordered in ED: Rapid SARS-CoV-2 by PCR    COVID Suspicion at Admission: N/A    Running Infusions:  None    Mental Status/LOC at time of transport: A&Ox4    Other pertinent information:   CIWA score: N/A   NIH score:  N/A

## 2023-09-28 NOTE — ED QUICK NOTES
Patient has a history of dilated cardiomyopathy, CHF, cocaine abuse. EF 15-20% last year. Was wearing a Lifevest last year with plans for implanted Defib. Patient is supposed to be taking Cardizem, Torsemide and Spironolactone. Patient states he's not been taking these medications for about a month. States shortness of breath, with and without exertion, started yesterday. Can't lay flat without feeling like he can't breath. Denies any swelling in legs.

## 2023-09-28 NOTE — ED QUICK NOTES
Pt reports last cocaine use was last night 9/27/23. Pt states \"I'll be fine I wont go through withdrawals. \"    Pt ambulated to restroom on own w/ steady gait.

## 2023-09-29 ENCOUNTER — APPOINTMENT (OUTPATIENT)
Dept: CV DIAGNOSTICS | Facility: HOSPITAL | Age: 43
End: 2023-09-29
Attending: HOSPITALIST
Payer: MEDICAID

## 2023-09-29 VITALS
BODY MASS INDEX: 34.67 KG/M2 | HEART RATE: 90 BPM | TEMPERATURE: 98 F | OXYGEN SATURATION: 98 % | SYSTOLIC BLOOD PRESSURE: 104 MMHG | HEIGHT: 72 IN | WEIGHT: 256 LBS | DIASTOLIC BLOOD PRESSURE: 69 MMHG | RESPIRATION RATE: 20 BRPM

## 2023-09-29 LAB
ALBUMIN SERPL-MCNC: 3 G/DL (ref 3.4–5)
ALBUMIN/GLOB SERPL: 0.7 {RATIO} (ref 1–2)
ALP LIVER SERPL-CCNC: 69 U/L
ALT SERPL-CCNC: 67 U/L
ANION GAP SERPL CALC-SCNC: 7 MMOL/L (ref 0–18)
AST SERPL-CCNC: 27 U/L (ref 15–37)
BILIRUB SERPL-MCNC: 0.8 MG/DL (ref 0.1–2)
BUN BLD-MCNC: 16 MG/DL (ref 7–18)
CALCIUM BLD-MCNC: 8.8 MG/DL (ref 8.5–10.1)
CHLORIDE SERPL-SCNC: 106 MMOL/L (ref 98–112)
CO2 SERPL-SCNC: 26 MMOL/L (ref 21–32)
CREAT BLD-MCNC: 0.76 MG/DL
EGFRCR SERPLBLD CKD-EPI 2021: 114 ML/MIN/1.73M2 (ref 60–?)
GLOBULIN PLAS-MCNC: 4.4 G/DL (ref 2.8–4.4)
GLUCOSE BLD-MCNC: 122 MG/DL (ref 70–99)
MAGNESIUM SERPL-MCNC: 2.2 MG/DL (ref 1.6–2.6)
OSMOLALITY SERPL CALC.SUM OF ELEC: 290 MOSM/KG (ref 275–295)
POTASSIUM SERPL-SCNC: 3.4 MMOL/L (ref 3.5–5.1)
PROT SERPL-MCNC: 7.4 G/DL (ref 6.4–8.2)
SODIUM SERPL-SCNC: 139 MMOL/L (ref 136–145)

## 2023-09-29 PROCEDURE — 93306 TTE W/DOPPLER COMPLETE: CPT | Performed by: HOSPITALIST

## 2023-09-29 PROCEDURE — 99239 HOSP IP/OBS DSCHRG MGMT >30: CPT | Performed by: HOSPITALIST

## 2023-09-29 RX ORDER — TORSEMIDE 20 MG/1
20 TABLET ORAL 2 TIMES DAILY
Qty: 60 TABLET | Refills: 0 | Status: SHIPPED | OUTPATIENT
Start: 2023-09-29

## 2023-09-29 RX ORDER — POTASSIUM CHLORIDE 20 MEQ/1
40 TABLET, EXTENDED RELEASE ORAL EVERY 4 HOURS
Status: COMPLETED | OUTPATIENT
Start: 2023-09-29 | End: 2023-09-29

## 2023-09-29 RX ORDER — CARVEDILOL 25 MG/1
25 TABLET ORAL 2 TIMES DAILY WITH MEALS
Qty: 60 TABLET | Refills: 3 | Status: SHIPPED | OUTPATIENT
Start: 2023-09-29

## 2023-09-29 RX ORDER — SPIRONOLACTONE 25 MG/1
25 TABLET ORAL DAILY
Qty: 30 TABLET | Refills: 3 | Status: SHIPPED | OUTPATIENT
Start: 2023-09-29

## 2023-09-29 RX ORDER — LOSARTAN POTASSIUM 100 MG/1
100 TABLET ORAL DAILY
Qty: 30 TABLET | Refills: 5 | Status: SHIPPED | OUTPATIENT
Start: 2023-09-29

## 2023-09-29 NOTE — CONSULTS
Heart Failure discharge instructions added to the patients AVS  Will continue to follow    Adriano HOYOS, RN  Heart Failure Navigator

## 2023-09-29 NOTE — PLAN OF CARE
Najma Gonzales Patient Status:  Observation    1980 MRN [id number]   [facility] 2NE-A Attending Sho Guthrie MD   Hosp Day # 0 PCP None Pcp     Cardiology Nocturnal APN Note    Page Received: ED MD 0998    Briefly: (Documentation from chart review)     Najma Gonzales is a 35 yo M with PMH/PSH of dilated cardiomyopathy, HTN, hyperglycemia, cocaine abuse, alcohol dependence, smoker presents through  ED c/o KISER / SOB. Per ED documentation, patient has been noncompliant with home medications. EKG ST , pBNP 806, troponin 44, A1c 6.2. CXR without effusions / atelectasis. Cardiology consulted for SOB/KISER with known cardiomyopathy and medication noncompliance. Primary Cardiologist Linda Monroe and Dane, last OV 5/15/23     Vital Signs:       2023     7:10 PM 2023     8:39 PM   Vitals History   /85    BP Location Left arm    Pulse 106    Resp 34    SpO2 95 %    Weight  256 lbs   BMI  34.72 kg/m2        Labs:   Lab Results   Component Value Date    WBC 9.2 2023    HGB 16.2 2023    HCT 48.3 2023    .0 2023    CREATSERUM 0.95 2023    BUN 10 2023     2023    K 3.9 2023     2023    CO2 24.0 2023     2023    CA 8.9 2023    ALB 3.2 2023    ALKPHO 68 2023    BILT 0.8 2023    TP 7.6 2023    AST 44 2023    ALT 81 2023    PTT 26.2 2023    INR 1.04 2023    DDIMER 0.46 2023    TROPHS 44 2023       Diagnostics:   XR CHEST AP PORTABLE  (CPT=71045)    Result Date: 2023  PROCEDURE:  XR CHEST AP PORTABLE  (CPT=71045)  TECHNIQUE:  AP chest radiograph was obtained. COMPARISON:  [facility] , XR, XR CHEST AP PORTABLE  (CPT=71045), 2022, 1:07 PM.  INDICATIONS:  KELSIE  PATIENT STATED HISTORY: (As transcribed by Technologist)  Patient stated having difficulty breathing today.     FINDINGS: Cardiac silhouette and pulmonary vasculature are unremarkable. Mild interstitial abnormalities in both lungs. No pneumothorax. IMPRESSION: Mild interstitial abnormalities in the lungs. No definite consolidation.    LOCATION:  [id number]      Dictated by (CST): Idalmis Willard MD on 9/28/2023 at 3:28 PM     Finalized by (CST): Idalmis Willard MD on 9/28/2023 at 3:30 PM        Allergies:  No Known Allergies    Medications:    spironolactone    losartan    [START ON 9/29/2023] carvedilol    acetaminophen    melatonin    glycerin-hypromellose-    sodium chloride    [START ON 9/29/2023] enoxaparin    ondansetron    prochlorperazine    polyethylene glycol (PEG 3350)    sennosides    bisacodyl    fleet enema    [START ON 9/29/2023] torsemide    Assessment:   #GEOFFREY / Fernando Hoff noncompliant with home medications x 1 month PTA  #Cardiomyopathy; echo 5/2022 EF 15-20%, severe diffuse hypokinesis; [facility] 11/2022 PAP 34/15 non obstructive CAD, prescribed torsemide / aldactone  #HTN prescribed coreg, losartan   #Cocaine use, last 9/27   #Hyperglycemia, likely diabetic, A1c 6.2       Plan:  - Resume home medications; patient received IV lasix in ED, evaluate response and if additional IV diuretics are needed given prolonged medication noncompliance  - Withdrawal protocol per primary service   - Continue to monitor overnight  - Formal Cardiology consult to follow in [place], [address]  9/28/2023  9:01 PM

## 2023-09-29 NOTE — PROGRESS NOTES
Assumed care 0730. O2 sat 96% room air. Tele ST with hr-100s  Denies c/o pain. Up in chair with all meals. Daily wt and fluid restriction. Will continue POC.

## 2023-09-29 NOTE — PROGRESS NOTES
NURSING ADMISSION NOTE      Patient admitted via Cart  Oriented to room. Safety precautions initiated. Bed in low position. Call light in reach. Assumed care for this patient at [address]. Admission navigator/PTA med list complete. Patient alert and oriented x4. Up independently. NSR/ST on tele. Maintaining o2 sats on RA. Denies chest pain or SOB. Updated patient on POC, verbalized understanding. Cards consulted by previous RN.

## 2023-09-29 NOTE — PROGRESS NOTES
09/28/23 1905 09/28/23 1908 09/28/23 1910   Vital Signs   /84 120/85 112/85   MAP (mmHg) 96 97 93   BP Location Left arm Left arm Left arm   BP Method Automatic Automatic Automatic   Patient Position Lying Sitting Standing     Orthostatic BP

## 2023-09-29 NOTE — DISCHARGE INSTRUCTIONS
Going Home Instructions    In this section you will find the tools which will guide you through the first few days after you leave the hospital. Continued use of these tools will help you develop the skills necessary to keep your heart failure under control. Home Care Instructions Following Heart Failure - the most important things to do every day include:     Weigh yourself  Take your medicines as prescribed  Limit your sodium (salt) and fluid intake  Know when to call your cardiologist, primary doctor, or nurse  Know when to seek emergency care    Things for You to Remember:   1. See your doctor or healthcare provider. It is important that you attend this appointment to make sure your symptoms are under control. 2. Your recommended sodium intake is 1039-3745 mg daily    3. Limit your fluid intake to no more than 2 liters or 64 ounces per day    4. Some exercise and activity is important to help keep your heart functioning and strong. Unless instructed not to exercise, you may walk at a slow to moderate pace for 10-15 minutes 2-3 days per week to start. Pace your activity to prevent shortness of breath or fatigue. Stop exercise if you develop chest pain, lightheadedness, or significant shortness of breath.        Call Your Cardiologist If:   You gain 2 pounds overnight or 3-4 pounds in 3-5 days  You have more difficulty breathing  You are getting more tired with normal activity  You are more short of breath lying down, or awaken at night short of breath  You have swelling of your feet or legs  You urinate less often during the day and more often at night  You have cramps in your legs  You have blurred vision or see yellowish-green halos around objects of lights    Go to the Emergency Room If:   You have pain or tightness in your chest  You are extremely short of breath  You are coughing up pink-frothy mucus  You are traveling and develop symptoms of worsening heart failure

## 2023-09-29 NOTE — PROGRESS NOTES
Up walking in the hallway. Denies sob or chest pain. O2 sat 96% room air. DC instruction given,re-emphasize the importance of taking him medication-verbalized understanding. DC tele. [facility].

## 2023-10-02 ENCOUNTER — PATIENT OUTREACH (OUTPATIENT)
Dept: CASE MANAGEMENT | Age: 43
End: 2023-10-02

## 2023-10-02 NOTE — PROGRESS NOTES
Attempted to contact pt for condition update however no answer. Call continued to ring and did not go to a . [facility] to try again at a later time.

## 2023-10-03 NOTE — PROGRESS NOTES
NCM attempted to contact patient for condition update. No answer, call continued to ring and did not go to , NCM will try calling back at a later time.

## 2023-10-04 ENCOUNTER — PATIENT OUTREACH (OUTPATIENT)
Dept: CASE MANAGEMENT | Age: 43
End: 2023-10-04

## 2023-10-04 NOTE — PROGRESS NOTES
Hospital follow up, first attempt. (Discharged 9/29)    TCC request.    No answer, left a voicemail with callback information.

## 2023-10-06 NOTE — PROGRESS NOTES
Hospital follow up,      (Discharged 9/29)     TCC request.     No answer, left a voicemail with callback information.     Closing enounter

## 2023-10-16 NOTE — PROGRESS NOTES
Multiple attempts to reach the pt with no returned phone call. Past recommended HFU timeframe, closing encounter.

## 2024-08-20 ENCOUNTER — APPOINTMENT (OUTPATIENT)
Dept: GENERAL RADIOLOGY | Facility: HOSPITAL | Age: 44
End: 2024-08-20
Attending: EMERGENCY MEDICINE
Payer: MEDICAID

## 2024-08-20 ENCOUNTER — HOSPITAL ENCOUNTER (EMERGENCY)
Facility: HOSPITAL | Age: 44
Discharge: HOME | End: 2024-08-20
Attending: EMERGENCY MEDICINE
Payer: MEDICAID

## 2024-08-20 VITALS
WEIGHT: 272 LBS | RESPIRATION RATE: 25 BRPM | OXYGEN SATURATION: 93 % | BODY MASS INDEX: 37 KG/M2 | TEMPERATURE: 98 F | SYSTOLIC BLOOD PRESSURE: 118 MMHG | HEART RATE: 101 BPM | DIASTOLIC BLOOD PRESSURE: 97 MMHG

## 2024-08-20 DIAGNOSIS — E87.6 HYPOKALEMIA: ICD-10-CM

## 2024-08-20 DIAGNOSIS — I50.9 ACUTE ON CHRONIC HEART FAILURE, UNSPECIFIED HEART FAILURE TYPE (HCC): Primary | ICD-10-CM

## 2024-08-20 LAB
ALBUMIN SERPL-MCNC: 3.9 G/DL (ref 3.2–4.8)
ALBUMIN/GLOB SERPL: 1.4 {RATIO} (ref 1–2)
ALP LIVER SERPL-CCNC: 62 U/L
ALT SERPL-CCNC: 23 U/L
ANION GAP SERPL CALC-SCNC: 5 MMOL/L (ref 0–18)
AST SERPL-CCNC: 23 U/L (ref ?–34)
BASOPHILS # BLD AUTO: 0.06 X10(3) UL (ref 0–0.2)
BASOPHILS NFR BLD AUTO: 0.8 %
BILIRUB SERPL-MCNC: 1.1 MG/DL (ref 0.3–1.2)
BUN BLD-MCNC: 14 MG/DL (ref 9–23)
CALCIUM BLD-MCNC: 8.9 MG/DL (ref 8.7–10.4)
CHLORIDE SERPL-SCNC: 108 MMOL/L (ref 98–112)
CO2 SERPL-SCNC: 26 MMOL/L (ref 21–32)
CREAT BLD-MCNC: 0.82 MG/DL
EGFRCR SERPLBLD CKD-EPI 2021: 111 ML/MIN/1.73M2 (ref 60–?)
EOSINOPHIL # BLD AUTO: 0.21 X10(3) UL (ref 0–0.7)
EOSINOPHIL NFR BLD AUTO: 2.8 %
ERYTHROCYTE [DISTWIDTH] IN BLOOD BY AUTOMATED COUNT: 14.4 %
GLOBULIN PLAS-MCNC: 2.8 G/DL (ref 2–3.5)
GLUCOSE BLD-MCNC: 137 MG/DL (ref 70–99)
HCT VFR BLD AUTO: 47.7 %
HGB BLD-MCNC: 15.9 G/DL
IMM GRANULOCYTES # BLD AUTO: 0.02 X10(3) UL (ref 0–1)
IMM GRANULOCYTES NFR BLD: 0.3 %
LYMPHOCYTES # BLD AUTO: 1.9 X10(3) UL (ref 1–4)
LYMPHOCYTES NFR BLD AUTO: 25.4 %
MCH RBC QN AUTO: 29.8 PG (ref 26–34)
MCHC RBC AUTO-ENTMCNC: 33.3 G/DL (ref 31–37)
MCV RBC AUTO: 89.5 FL
MONOCYTES # BLD AUTO: 0.4 X10(3) UL (ref 0.1–1)
MONOCYTES NFR BLD AUTO: 5.3 %
NEUTROPHILS # BLD AUTO: 4.9 X10 (3) UL (ref 1.5–7.7)
NEUTROPHILS # BLD AUTO: 4.9 X10(3) UL (ref 1.5–7.7)
NEUTROPHILS NFR BLD AUTO: 65.4 %
NT-PROBNP SERPL-MCNC: 2063 PG/ML (ref ?–125)
OSMOLALITY SERPL CALC.SUM OF ELEC: 291 MOSM/KG (ref 275–295)
PLATELET # BLD AUTO: 182 10(3)UL (ref 150–450)
POTASSIUM SERPL-SCNC: 3.4 MMOL/L (ref 3.5–5.1)
PROT SERPL-MCNC: 6.7 G/DL (ref 5.7–8.2)
RBC # BLD AUTO: 5.33 X10(6)UL
SODIUM SERPL-SCNC: 139 MMOL/L (ref 136–145)
TROPONIN I SERPL HS-MCNC: 46 NG/L
WBC # BLD AUTO: 7.5 X10(3) UL (ref 4–11)

## 2024-08-20 PROCEDURE — 96375 TX/PRO/DX INJ NEW DRUG ADDON: CPT

## 2024-08-20 PROCEDURE — 96374 THER/PROPH/DIAG INJ IV PUSH: CPT

## 2024-08-20 PROCEDURE — 80053 COMPREHEN METABOLIC PANEL: CPT | Performed by: EMERGENCY MEDICINE

## 2024-08-20 PROCEDURE — 93010 ELECTROCARDIOGRAM REPORT: CPT

## 2024-08-20 PROCEDURE — 71045 X-RAY EXAM CHEST 1 VIEW: CPT | Performed by: EMERGENCY MEDICINE

## 2024-08-20 PROCEDURE — 93005 ELECTROCARDIOGRAM TRACING: CPT

## 2024-08-20 PROCEDURE — 85025 COMPLETE CBC W/AUTO DIFF WBC: CPT | Performed by: EMERGENCY MEDICINE

## 2024-08-20 PROCEDURE — 99285 EMERGENCY DEPT VISIT HI MDM: CPT

## 2024-08-20 PROCEDURE — 84484 ASSAY OF TROPONIN QUANT: CPT | Performed by: EMERGENCY MEDICINE

## 2024-08-20 PROCEDURE — 83880 ASSAY OF NATRIURETIC PEPTIDE: CPT | Performed by: EMERGENCY MEDICINE

## 2024-08-20 RX ORDER — BUMETANIDE 0.25 MG/ML
1 INJECTION INTRAMUSCULAR; INTRAVENOUS ONCE
Status: COMPLETED | OUTPATIENT
Start: 2024-08-20 | End: 2024-08-20

## 2024-08-20 RX ORDER — POTASSIUM CHLORIDE 1.5 G/1.58G
20 POWDER, FOR SOLUTION ORAL DAILY
Qty: 5 PACKET | Refills: 0 | Status: SHIPPED | OUTPATIENT
Start: 2024-08-20 | End: 2024-08-25

## 2024-08-20 RX ORDER — FUROSEMIDE 10 MG/ML
40 INJECTION INTRAMUSCULAR; INTRAVENOUS ONCE
Status: COMPLETED | OUTPATIENT
Start: 2024-08-20 | End: 2024-08-20

## 2024-08-20 RX ORDER — POTASSIUM CHLORIDE 1500 MG/1
40 TABLET, EXTENDED RELEASE ORAL ONCE
Status: COMPLETED | OUTPATIENT
Start: 2024-08-20 | End: 2024-08-20

## 2024-08-20 RX ORDER — TORSEMIDE 20 MG/1
20 TABLET ORAL 2 TIMES DAILY
Qty: 60 TABLET | Refills: 0 | Status: SHIPPED | OUTPATIENT
Start: 2024-08-20 | End: 2024-09-19

## 2024-08-20 RX ORDER — ASPIRIN 81 MG/1
324 TABLET, CHEWABLE ORAL ONCE
Status: DISCONTINUED | OUTPATIENT
Start: 2024-08-20 | End: 2024-08-20

## 2024-08-20 NOTE — ED PROVIDER NOTES
Patient Seen in: [facility] Emergency Department      History     Chief Complaint   Patient presents with    Difficulty Breathing     Stated Complaint: feeling sob, ran out of torsemide    Subjective:   HPI    43-year-old male presents to the emergency department with increasing edema and feeling short of breath.  Patient has a known history of congestive heart failure secondary to dilated cardiomyopathy.  He is on torsemide.  He states he used to do daily weights but admits that he has not been doing them consistently lately.  He states that he feels that he has put on quite a bit of weight.  He had increased his torsemide from 20 mg a day to trying to take 40 mg a day and then he would have some improvement and he would cut back again and get worse.  Patient states because of this he ran out of his torsemide.  He is scheduled to see his cardiologist tomorrow.  Patient does have a history of sleep apnea but admits he has not been compliant with wearing a CPAP machine and states that he needs to be retested.  No fevers or chills.  No known exposure to anyone ill.  No cough cold or congestion.  No other acute complaints.  Patient states his symptoms are worse when he is supine or if he is active.  No distinct chest pain or chest discomfort.    Objective:   Past Medical History:    CHF (congestive heart failure) (HCC)    Cocaine abuse (HCC)    Dilated cardiomyopathy (HCC)    Nicotine dependence    Noncompliance with medication regimen    Sleep apnea    Uses LifeVest defibrillator    In 2022              Past Surgical History:   Procedure Laterality Date    Removal gallbladder                  Social History     Socioeconomic History    Marital status:    Tobacco Use    Smoking status: Every Day     Current packs/day: 1.00     Types: Cigarettes    Smokeless tobacco: Never   Vaping Use    Vaping status: Every Day   Substance and Sexual Activity    Alcohol use: Yes     Alcohol/week: 35.0 standard drinks of  alcohol     Types: 35 Cans of beer per week    Drug use: Yes     Frequency: 7.0 times per week     Types: Cocaine, Cannabis     Comment: daily     Social Determinants of Health     Financial Resource Strain: Not on File (2022)    Received from [facility]    Financial Resource Strain     Financial Resource Strain: 0   Food Insecurity: Not on File (2022)    Received from [facility]    Food Insecurity     Food: 0   Transportation Needs: Not on File (2022)    Received from [facility]    Transportation Needs     Transportation: 0   Physical Activity: Not on File (2022)    Received from [facility]    Physical Activity     Physical Activity: 0   Stress: Not on File (2022)    Received from [facility]    Stress     Stress: 0   Social Connections: Not on File (2022)    Received from [facility]    Social Connections     Social Connections and Isolation: 0   Housing Stability: Not on File (2022)    Received from [facility]    Housing Stability     Housin              Review of Systems   All other systems reviewed and are negative.      Positive for stated Chief Complaint: Difficulty Breathing    Other systems are as noted in HPI.  Constitutional and vital signs reviewed.      All other systems reviewed and negative except as noted above.    Physical Exam     ED Triage Vitals   BP 24 1013 136/90   Pulse 24 1020 98   Resp 24 1013 20   Temp 24 1013 97.8 °F (36.6 °C)   Temp src 24 1013 Temporal   SpO2 24 1013 95 %   O2 Device 24 1013 None (Room air)       Current Vitals:   Vital Signs  BP: (!) 118/97  Pulse: 101  Resp: 25  Temp: 97.8 °F (36.6 °C)  Temp src: Temporal  MAP (mmHg): (!) 103    Oxygen Therapy  SpO2: 93 %  O2 Device: None (Room air)  O2 Flow Rate (L/min): 2 L/min            Physical Exam  Vitals and nursing note reviewed.   Constitutional:       General: He is in acute distress.      Appearance: Normal appearance. He is  well-developed. He is obese. He is not toxic-appearing or diaphoretic.   HENT:      Head: Normocephalic and atraumatic.   Cardiovascular:      Rate and Rhythm: Normal rate and regular rhythm.      Pulses: Normal pulses.      Heart sounds: Normal heart sounds.   Pulmonary:      Effort: Pulmonary effort is normal.      Breath sounds: No stridor. Rales present.   Abdominal:      General: Bowel sounds are normal.      Palpations: Abdomen is soft.   Musculoskeletal:         General: Normal range of motion.      Cervical back: Normal range of motion and neck supple.      Right lower leg: Edema present.      Left lower leg: Edema present.   Lymphadenopathy:      Cervical: No cervical adenopathy.   Skin:     General: Skin is warm and dry.      Capillary Refill: Capillary refill takes less than 2 seconds.   Neurological:      General: No focal deficit present.      Mental Status: He is alert and oriented to person, place, and time.              ED Course     Labs Reviewed   COMP METABOLIC PANEL (14) - Abnormal; Notable for the following components:       Result Value    Glucose 137 (*)     Potassium 3.4 (*)     All other components within normal limits   PRO BETA NATRIURETIC PEPTIDE - Abnormal; Notable for the following components:    Pro-Beta Natriuretic Peptide 2,063 (*)     All other components within normal limits   TROPONIN I HIGH SENSITIVITY - Normal   CBC WITH DIFFERENTIAL WITH PLATELET   RAINBOW DRAW BLUE     EKG    Rate, intervals and axes as noted on EKG Report.  Rate: 97  Rhythm: Sinus Rhythm  Reading: No acute ST segment elevation                 XR CHEST AP PORTABLE  (CPT=71045)    Result Date: 8/20/2024  PROCEDURE:  XR CHEST AP PORTABLE  (CPT=71045)  TECHNIQUE:  AP chest radiograph was obtained.  COMPARISON:  [facility] , XR, XR CHEST AP PORTABLE  (CPT=71045), 9/28/2023, 3:11 PM.  INDICATIONS:  feeling sob, ran out of torsemide  PATIENT STATED HISTORY: (As transcribed by Technologist)  Patient states he has been  retaining fluids since yesterday when he ran out of his Torsemide. He states he has a history of CHF.                 CONCLUSION:   Cardiomegaly.  Findings of congestive heart failure with mild/moderate interstitial pulmonary edema.  No airspace consolidation.  The pleural spaces are clear.   LOCATION:  [facility]      Dictated by (CST): William Armstrong MD on 8/20/2024 at 11:43 AM     Finalized by (CST): William Armstrong MD on 8/20/2024 at 11:44 AM              MDM      Patient had IV established and blood work obtained.  Patient was placed on monitor.  Patient was initially hypoxic on room air.  He does seem fluid overloaded.  He clearly has had some issues with running out of his medication and therefore not being compliant and also increased need for his medications.  Subsequently he initially received 40 of Lasix and then 1 of Bumex.  Patient had almost 3 L diuresed.  We were able to take him off his oxygen his O2 saturations were in the mid to upper 90s.  Patient's baseline blood work did reveal a slightly low potassium and he was given some oral potassium while in the emergency department.  He had a proBNP over 2000 which is consistent with congestive heart failure and his exam.  Patient had a chest x-ray that personally reviewed I did not appreciate any pneumothorax or large pleural effusions I do feel that it does appear to be consistent with fluid overload or reviewed radiology report they feel that he has cardiomegaly and signs of congestive heart failure as well.  We discussed care.  Patient is feeling significantly better and is scheduled to see his cardiologist tomorrow.  I spoke with cardiology will increase his torsemide to 40 mg daily and he is to keep his appointment tomorrow.  Patient understands and was subsequently discharged                                   Medical Decision Making      Disposition and Plan     Clinical Impression:  1. Acute on chronic heart failure, unspecified heart failure type (HCC)     2. Hypokalemia         Disposition:  Discharge  8/20/2024  2:14 pm    Follow-up:  Joseph Monroe MD  [facility] [address] [address]  [phone]    Follow up            Medications Prescribed:  Discharge Medication List as of 8/20/2024  2:18 PM        START taking these medications    Details   !! torsemide 20 MG Oral Tab Take 1 tablet (20 mg total) by mouth in the morning and 1 tablet (20 mg total) before bedtime., Normal, Disp-60 tablet, R-0      potassium chloride 20 MEQ Oral Powd Pack Take 20 mEq by mouth daily for 5 days., Normal, Disp-5 packet, R-0       !! - Potential duplicate medications found. Please discuss with provider.

## 2024-08-21 LAB
ATRIAL RATE: 97 BPM
P AXIS: 60 DEGREES
P-R INTERVAL: 174 MS
Q-T INTERVAL: 418 MS
QRS DURATION: 138 MS
QTC CALCULATION (BEZET): 530 MS
R AXIS: 8 DEGREES
T AXIS: 69 DEGREES
VENTRICULAR RATE: 97 BPM

## 2024-09-05 ENCOUNTER — TELEPHONE (OUTPATIENT)
Dept: CASE MANAGEMENT | Facility: HOSPITAL | Age: 44
End: 2024-09-05

## 2024-09-05 ENCOUNTER — HOSPITAL ENCOUNTER (EMERGENCY)
Facility: HOSPITAL | Age: 44
Discharge: HOME | End: 2024-09-05
Attending: EMERGENCY MEDICINE
Payer: MEDICAID

## 2024-09-05 ENCOUNTER — APPOINTMENT (OUTPATIENT)
Dept: GENERAL RADIOLOGY | Facility: HOSPITAL | Age: 44
End: 2024-09-05
Payer: MEDICAID

## 2024-09-05 VITALS
OXYGEN SATURATION: 92 % | SYSTOLIC BLOOD PRESSURE: 116 MMHG | HEART RATE: 90 BPM | RESPIRATION RATE: 26 BRPM | WEIGHT: 270 LBS | BODY MASS INDEX: 36.57 KG/M2 | DIASTOLIC BLOOD PRESSURE: 81 MMHG | HEIGHT: 72 IN | TEMPERATURE: 98 F

## 2024-09-05 DIAGNOSIS — I50.9 ACUTE ON CHRONIC CONGESTIVE HEART FAILURE, UNSPECIFIED HEART FAILURE TYPE (HCC): Primary | ICD-10-CM

## 2024-09-05 LAB
ALBUMIN SERPL-MCNC: 4 G/DL (ref 3.2–4.8)
ALBUMIN/GLOB SERPL: 1.4 {RATIO} (ref 1–2)
ALP LIVER SERPL-CCNC: 70 U/L
ALT SERPL-CCNC: 21 U/L
ANION GAP SERPL CALC-SCNC: 8 MMOL/L (ref 0–18)
AST SERPL-CCNC: 21 U/L (ref ?–34)
BASOPHILS # BLD AUTO: 0.06 X10(3) UL (ref 0–0.2)
BASOPHILS NFR BLD AUTO: 0.9 %
BILIRUB SERPL-MCNC: 0.6 MG/DL (ref 0.3–1.2)
BUN BLD-MCNC: 20 MG/DL (ref 9–23)
CALCIUM BLD-MCNC: 9.5 MG/DL (ref 8.7–10.4)
CHLORIDE SERPL-SCNC: 104 MMOL/L (ref 98–112)
CO2 SERPL-SCNC: 30 MMOL/L (ref 21–32)
CREAT BLD-MCNC: 1.02 MG/DL
EGFRCR SERPLBLD CKD-EPI 2021: 93 ML/MIN/1.73M2 (ref 60–?)
EOSINOPHIL # BLD AUTO: 0.4 X10(3) UL (ref 0–0.7)
EOSINOPHIL NFR BLD AUTO: 6 %
ERYTHROCYTE [DISTWIDTH] IN BLOOD BY AUTOMATED COUNT: 14 %
GLOBULIN PLAS-MCNC: 2.8 G/DL (ref 2–3.5)
GLUCOSE BLD-MCNC: 126 MG/DL (ref 70–99)
HCT VFR BLD AUTO: 46 %
HGB BLD-MCNC: 15.7 G/DL
IMM GRANULOCYTES # BLD AUTO: 0.01 X10(3) UL (ref 0–1)
IMM GRANULOCYTES NFR BLD: 0.1 %
LYMPHOCYTES # BLD AUTO: 1.58 X10(3) UL (ref 1–4)
LYMPHOCYTES NFR BLD AUTO: 23.6 %
MCH RBC QN AUTO: 30.1 PG (ref 26–34)
MCHC RBC AUTO-ENTMCNC: 34.1 G/DL (ref 31–37)
MCV RBC AUTO: 88.1 FL
MONOCYTES # BLD AUTO: 0.52 X10(3) UL (ref 0.1–1)
MONOCYTES NFR BLD AUTO: 7.8 %
NEUTROPHILS # BLD AUTO: 4.13 X10 (3) UL (ref 1.5–7.7)
NEUTROPHILS # BLD AUTO: 4.13 X10(3) UL (ref 1.5–7.7)
NEUTROPHILS NFR BLD AUTO: 61.6 %
NT-PROBNP SERPL-MCNC: 1311 PG/ML (ref ?–125)
OSMOLALITY SERPL CALC.SUM OF ELEC: 298 MOSM/KG (ref 275–295)
PLATELET # BLD AUTO: 177 10(3)UL (ref 150–450)
POTASSIUM SERPL-SCNC: 3.3 MMOL/L (ref 3.5–5.1)
PROT SERPL-MCNC: 6.8 G/DL (ref 5.7–8.2)
RBC # BLD AUTO: 5.22 X10(6)UL
SODIUM SERPL-SCNC: 142 MMOL/L (ref 136–145)
TROPONIN I SERPL HS-MCNC: 47 NG/L
WBC # BLD AUTO: 6.7 X10(3) UL (ref 4–11)

## 2024-09-05 PROCEDURE — 84484 ASSAY OF TROPONIN QUANT: CPT | Performed by: EMERGENCY MEDICINE

## 2024-09-05 PROCEDURE — 83880 ASSAY OF NATRIURETIC PEPTIDE: CPT | Performed by: EMERGENCY MEDICINE

## 2024-09-05 PROCEDURE — 99285 EMERGENCY DEPT VISIT HI MDM: CPT

## 2024-09-05 PROCEDURE — 84484 ASSAY OF TROPONIN QUANT: CPT

## 2024-09-05 PROCEDURE — 85025 COMPLETE CBC W/AUTO DIFF WBC: CPT | Performed by: EMERGENCY MEDICINE

## 2024-09-05 PROCEDURE — 93010 ELECTROCARDIOGRAM REPORT: CPT

## 2024-09-05 PROCEDURE — 80053 COMPREHEN METABOLIC PANEL: CPT

## 2024-09-05 PROCEDURE — 93005 ELECTROCARDIOGRAM TRACING: CPT

## 2024-09-05 PROCEDURE — 96374 THER/PROPH/DIAG INJ IV PUSH: CPT

## 2024-09-05 PROCEDURE — 85025 COMPLETE CBC W/AUTO DIFF WBC: CPT

## 2024-09-05 PROCEDURE — 71045 X-RAY EXAM CHEST 1 VIEW: CPT

## 2024-09-05 PROCEDURE — 83880 ASSAY OF NATRIURETIC PEPTIDE: CPT

## 2024-09-05 PROCEDURE — 80053 COMPREHEN METABOLIC PANEL: CPT | Performed by: EMERGENCY MEDICINE

## 2024-09-05 RX ORDER — FUROSEMIDE 10 MG/ML
80 INJECTION INTRAMUSCULAR; INTRAVENOUS ONCE
Status: COMPLETED | OUTPATIENT
Start: 2024-09-05 | End: 2024-09-05

## 2024-09-05 RX ORDER — TORSEMIDE 20 MG/1
40 TABLET ORAL 2 TIMES DAILY
Qty: 120 TABLET | Refills: 0 | Status: SHIPPED | OUTPATIENT
Start: 2024-09-05 | End: 2024-10-05

## 2024-09-05 NOTE — CM/SW NOTE
Patient requesting a refill on Torosemide.    Patient has not scheduled follow up appointment.  Patient states he will need to return to the ED because \"I can't go without these pills\".

## 2024-09-06 LAB
ATRIAL RATE: 89 BPM
P AXIS: 59 DEGREES
P-R INTERVAL: 178 MS
Q-T INTERVAL: 420 MS
QRS DURATION: 138 MS
QTC CALCULATION (BEZET): 511 MS
R AXIS: -25 DEGREES
T AXIS: 84 DEGREES
VENTRICULAR RATE: 89 BPM

## 2024-09-06 RX ORDER — POTASSIUM CHLORIDE 1500 MG/1
20 TABLET, EXTENDED RELEASE ORAL DAILY
Qty: 5 TABLET | Refills: 0 | Status: SHIPPED | OUTPATIENT
Start: 2024-09-06 | End: 2024-09-11

## 2024-09-06 NOTE — DISCHARGE INSTRUCTIONS
I spoke with nurse practitioner for Dr. Monroe.  They are going to talk to their office and try and get you set up for an earlier appointment.  Please call them tomorrow to see if they can get you in earlier than October 2.    I gave you a 30-day supply of your torsemide

## 2024-09-06 NOTE — ED PROVIDER NOTES
Patient Seen in: [facility] Emergency Department      History     Chief Complaint   Patient presents with    Difficulty Breathing     Stated Complaint: KELSIE, denies chest pain    Subjective:   HPI    Patient is a 43-year-old male presents to ED for evaluation difficulty breathing.  Patient states he has history of dilated cardiomyopathy.  He complains of shortness of breath starting at 530 tonight.  Denies chest pain.  Patient seen in ED on 8/20/2024 after running out of his torsemide.  Was on 20 mg torsemide twice daily and increased to 40 mg torsemide twice daily.  He was felt to have a cardiologist appointment yesterday that he was not aware of and missed it and now ran out of his medicine.  Patient denies thromboembolic risk factors such as family history, smoking, recent travel, recent surgery.    Objective:   Past Medical History:    CHF (congestive heart failure) (HCC)    Cocaine abuse (HCC)    Dilated cardiomyopathy (HCC)    Nicotine dependence    Noncompliance with medication regimen    Sleep apnea    Uses LifeVest defibrillator    In 2022              Past Surgical History:   Procedure Laterality Date    Removal gallbladder                  Social History     Socioeconomic History    Marital status:    Tobacco Use    Smoking status: Every Day     Current packs/day: 1.00     Types: Cigarettes    Smokeless tobacco: Never   Vaping Use    Vaping status: Every Day   Substance and Sexual Activity    Alcohol use: Yes     Alcohol/week: 35.0 standard drinks of alcohol     Types: 35 Cans of beer per week    Drug use: Yes     Frequency: 7.0 times per week     Types: Cocaine, Cannabis     Comment: daily     Social Determinants of Health     Financial Resource Strain: Not on File (12/19/2022)    Received from [facility]    Financial Resource Strain     Financial Resource Strain: 0   Food Insecurity: Not on File (12/19/2022)    Received from [facility]    Food Insecurity     Food: 0   Transportation  Needs: Not on File (2022)    Received from [facility]    Transportation Needs     Transportation: 0   Physical Activity: Not on File (2022)    Received from [facility]    Physical Activity     Physical Activity: 0   Stress: Not on File (2022)    Received from [facility]    Stress     Stress: 0   Social Connections: Not on File (2022)    Received from [facility]    Social Connections     Social Connections and Isolation: 0   Housing Stability: Not on File (2022)    Received from [facility]    Housing Stability     Housin              Review of Systems    Positive for stated Chief Complaint: Difficulty Breathing    Other systems are as noted in HPI.  Constitutional and vital signs reviewed.      All other systems reviewed and negative except as noted above.    Physical Exam     ED Triage Vitals [24]   BP (!) 140/91   Pulse 102   Resp 22   Temp 97.5 °F (36.4 °C)   Temp src    SpO2 96 %   O2 Device None (Room air)       Current Vitals:   Vital Signs  BP: 116/81  Pulse: 90  Resp: 26  Temp: 97.5 °F (36.4 °C)  MAP (mmHg): 92    Oxygen Therapy  SpO2: 92 %  O2 Device: None (Room air)            Physical Exam    GENERAL: No acute distress, well appearing and non-toxic, Alert and oriented X 3   HEENT: Normocephalic, atraumatic.  Moist mucous membranes.  Pupils equal round reactive to light and accommodation, extraocular motion is intact, sclerae white, conjunctiva is pink.  Oropharynx is unremarkable, no exudate.  NECK: Supple, trachea midline, no lymphadenopathy.   LUNG: Lungs clear to auscultation bilaterally, no wheezing, no rales, no rhonchi.  CARDIOVASCULAR: Regular rate and rhythm.  Normal S1S2.  No S3S4 or murmur.  ABDOMEN: Bowel sounds are present. Soft. nondistended, no pulsatile masses. nontender  MUSCULOSKELETAL: No calf tenderness.  Dorsalis and Posterior Tibial pulses present. No clubbing. No cyanosis.  Trace lower extremity edema  SKIN EXAMINATIoN: Warm and  dry with normal appearance.  No rashes or lesions.  NEUROLOGICAL:  Motor strength intact all groups.  normal sensation, speech intact    ED Course     Labs Reviewed   COMP METABOLIC PANEL (14) - Abnormal; Notable for the following components:       Result Value    Glucose 126 (*)     Potassium 3.3 (*)     Calculated Osmolality 298 (*)     All other components within normal limits   PRO BETA NATRIURETIC PEPTIDE - Abnormal; Notable for the following components:    Pro-Beta Natriuretic Peptide 1,311 (*)     All other components within normal limits   TROPONIN I HIGH SENSITIVITY - Normal   CBC WITH DIFFERENTIAL WITH PLATELET   RAINBOW DRAW LAVENDER   RAINBOW DRAW LIGHT GREEN   RAINBOW DRAW BLUE   Potassium 3.3.  proBNP 1311  EKG    Rate, intervals and axes as noted on EKG Report.  Rate: 89  Rhythm: Sinus Rhythm  Reading: LVH with QRS widening                 Medications   furosemide (Lasix) 10 mg/mL injection 80 mg (80 mg Intravenous Given 9/5/24 2321)       XR CHEST AP PORTABLE  (CPT=71045)    Result Date: 9/5/2024  PROCEDURE:  XR CHEST AP PORTABLE  (CPT=71045)  TECHNIQUE:  AP chest radiograph was obtained.  COMPARISON:  [facility] , JEANE, XR CHEST AP PORTABLE  (CPT=71045), 8/20/2024, 11:16 AM.  INDICATIONS:  KELSIE, denies chest pain  PATIENT STATED HISTORY: (As transcribed by Technologist)  Patient complains of difficult breathing the last few days. States he normally takes diuretic pills.    FINDINGS:  Stable cardiomegaly.  There is decreased pulmonary vascular congestion and pulmonary vascular prominence.  The previously noted pulmonary edema appears to have improved.  Stable mild subsegmental atelectasis versus scarring along the lingula.  No new lung opacities.  No evidence of pleural disease            CONCLUSION:  Stable cardiomegaly with mild interval decrease in pulmonary vascular congestion and pulmonary edema.   LOCATION:  [facility]      Dictated by (CST): Harvey Bray DO on 9/05/2024 at 9:47 PM     Finalized by  (CST): Harvey Bray DO on 9/05/2024 at 9:48 PM            Medications   furosemide (Lasix) 10 mg/mL injection 80 mg (80 mg Intravenous Given 9/5/24 1358)         MDM      Patient is a 43-year-old male presents to ED for evaluation of shortness of breath with laying flat and exertion.  Differential includes CHF, pneumonia.  Patient laboratory test performed showing elevated BNP of 1311.  Potassium 3.3.  EKG showed LVH which was an old finding.  Chest x-ray shows mild interval decrease in pulmonary vascular congestion.  Patient ran out of his meds today with mild fluid overload.  Does not require admission to hospital.  Spoke with [facility] cardiovascular [place] nurse practitioner.  Patient given IV Lasix 80 mg here.  Will be sent home with torsemide 40 mg twice daily.  Will be given potassium supplementation for 5 days 20 mill equivalents daily.  Cardiology is going to try and get patient in sooner than October 2.    Patient was screened and evaluated during this visit.   As a treating physician attending to the patient, I determined, within reasonable clinical confidence and prior to discharge, that an emergency medical condition was not or was no longer present.  There was no indication for further evaluation, treatment or admission on an emergency basis.  Comprehensive verbal and written discharge and follow-up instructions were provided to help prevent relapse or worsening.  Patient was instructed to follow-up with her primary care provider for further evaluation and treatment, but to return immediately to the ER for worsening, concerning, new, changing or persisting symptoms.  I discussed the case with the patient and they had no questions, complaints, or concerns.  Patient felt comfortable going home.                                       Medical Decision Making      Disposition and Plan     Clinical Impression:  1. Acute on chronic congestive heart failure, unspecified heart failure type (HCC)    2.   Hypokalemia    Disposition:  Discharge  9/5/2024 11:09 pm    Follow-up:  Joseph Monroe MD  [address] [address]  [phone]    Follow up in 3 day(s)            Medications Prescribed:  Discharge Medication List as of 9/5/2024 11:26 PM        START taking these medications    Details   !! torsemide 20 MG Oral Tab Take 2 tablets (40 mg total) by mouth in the morning and 2 tablets (40 mg total) before bedtime., Normal, Disp-120 tablet, R-0       !! - Potential duplicate medications found. Please discuss with provider.

## 2024-09-06 NOTE — ED INITIAL ASSESSMENT (HPI)
Patient ran out of torsemide today and only took half his dose of medication. Patient feels SOB and wont be able to see his doctor till October to get a refil

## 2024-11-17 ENCOUNTER — HOSPITAL ENCOUNTER (EMERGENCY)
Facility: HOSPITAL | Age: 44
Discharge: HOME | End: 2024-11-17
Attending: EMERGENCY MEDICINE
Payer: MEDICARE

## 2024-11-17 VITALS
RESPIRATION RATE: 20 BRPM | HEART RATE: 106 BPM | TEMPERATURE: 98 F | WEIGHT: 272 LBS | HEIGHT: 72 IN | SYSTOLIC BLOOD PRESSURE: 132 MMHG | BODY MASS INDEX: 36.84 KG/M2 | DIASTOLIC BLOOD PRESSURE: 88 MMHG | OXYGEN SATURATION: 94 %

## 2024-11-17 DIAGNOSIS — Z76.0 ENCOUNTER FOR MEDICATION REFILL: Primary | ICD-10-CM

## 2024-11-17 PROCEDURE — 99283 EMERGENCY DEPT VISIT LOW MDM: CPT

## 2024-11-17 PROCEDURE — 99284 EMERGENCY DEPT VISIT MOD MDM: CPT

## 2024-11-17 RX ORDER — TORSEMIDE 20 MG/1
40 TABLET ORAL 2 TIMES DAILY
Qty: 120 TABLET | Refills: 0 | Status: SHIPPED | OUTPATIENT
Start: 2024-11-17

## 2024-11-17 NOTE — ED PROVIDER NOTES
Patient Seen in: [facility] Emergency Department      History     Chief Complaint   Patient presents with    Medication Administration     Stated Complaint: SOB after completing water pills. no chest pain.    Subjective:   HPI      43-year-old male with a history of dilated cardiomyopathy here because he ran out of his torsemide.  He states he takes 40 mg in the morning and 40 at night last had his medication yesterday he otherwise is asymptomatic he states he had a primary care physician's appointment last week that he missed.  But is not having chest pain or increased swelling in his legs or shortness of breath    Objective:     Past Medical History:    CHF (congestive heart failure) (HCC)    Cocaine abuse (HCC)    Dilated cardiomyopathy (HCC)    Nicotine dependence    Noncompliance with medication regimen    Sleep apnea    Uses LifeVest defibrillator    In 2022              Past Surgical History:   Procedure Laterality Date    Removal gallbladder                  Social History     Socioeconomic History    Marital status:    Tobacco Use    Smoking status: Every Day     Current packs/day: 1.00     Types: Cigarettes    Smokeless tobacco: Never   Vaping Use    Vaping status: Every Day   Substance and Sexual Activity    Alcohol use: Yes     Alcohol/week: 35.0 standard drinks of alcohol     Types: 35 Cans of beer per week    Drug use: Yes     Frequency: 7.0 times per week     Types: Cocaine, Cannabis     Comment: daily     Social Drivers of Health     Financial Resource Strain: Not on File (12/19/2022)    Received from [facility]    Financial Resource Strain     Financial Resource Strain: 0   Food Insecurity: Not on File (9/26/2024)    Received from [facility]    Food Insecurity     Food: 0   Transportation Needs: Not on File (12/19/2022)    Received from [facility]    Transportation Needs     Transportation: 0   Physical Activity: Not on File (12/19/2022)    Received from [facility]    Physical  Activity     Physical Activity: 0   Stress: Not on File (12/19/2022)    Received from [facility]    Stress     Stress: 0   Social Connections: Not on File (9/13/2024)    Received from [facility]    Social Connections     Connectedness: 0    Received from [facility]    Housing Stability                  Physical Exam     ED Triage Vitals [11/17/24 1254]   /88   Pulse 106   Resp 20   Temp 97.6 °F (36.4 °C)   Temp src    SpO2 94 %   O2 Device        Current Vitals:   Vital Signs  BP: 132/88  Pulse: 106  Resp: 20  Temp: 97.6 °F (36.4 °C)    Oxygen Therapy  SpO2: 94 %        Physical Exam  Patient is alert orient x 3 no acute distress lungs are clear cardiovascular exam shows regular rate and rhythm without murmurs extremities there is trace edema lower extremities    ED Course   Labs Reviewed - No data to display                MDM      Initial differential diagnosis includes CHF medication refill        Medical Decision Making    Patient was given a refill of his torsemide advised to follow-up with cardiology and SIDNEY  Disposition and Plan     Clinical Impression:  1. Encounter for medication refill         Disposition:  Discharge  11/17/2024  1:31 pm    Follow-up:  SIDNEY CONTRERAS  [address] [place] [address] [id number] [phone]  Follow up in 1 week(s)            Medications Prescribed:  Current Discharge Medication List              Supplementary Documentation:

## 2024-11-17 NOTE — ED INITIAL ASSESSMENT (HPI)
Pt here due to running out of torsemide last night. Pt is supposed to take it BID, pt denies symptoms at this time, just needs meds refilled.

## 2024-11-17 NOTE — DISCHARGE INSTRUCTIONS
Schedule appointment with primary care physician and cardiology.  Take the medications as prescribed return any problems

## 2024-12-13 ENCOUNTER — HOSPITAL ENCOUNTER (EMERGENCY)
Facility: HOSPITAL | Age: 44
Discharge: HOME | End: 2024-12-13
Attending: EMERGENCY MEDICINE
Payer: MEDICARE

## 2024-12-13 VITALS
HEART RATE: 93 BPM | WEIGHT: 272 LBS | BODY MASS INDEX: 36.84 KG/M2 | SYSTOLIC BLOOD PRESSURE: 128 MMHG | TEMPERATURE: 97 F | OXYGEN SATURATION: 97 % | HEIGHT: 72 IN | RESPIRATION RATE: 18 BRPM | DIASTOLIC BLOOD PRESSURE: 90 MMHG

## 2024-12-13 DIAGNOSIS — Z76.0 ENCOUNTER FOR MEDICATION REFILL: Primary | ICD-10-CM

## 2024-12-13 PROCEDURE — 99282 EMERGENCY DEPT VISIT SF MDM: CPT

## 2024-12-13 PROCEDURE — 99283 EMERGENCY DEPT VISIT LOW MDM: CPT

## 2024-12-13 RX ORDER — TORSEMIDE 20 MG/1
40 TABLET ORAL 2 TIMES DAILY
Qty: 120 TABLET | Refills: 0 | Status: SHIPPED | OUTPATIENT
Start: 2024-12-13 | End: 2025-01-12

## 2024-12-13 NOTE — ED PROVIDER NOTES
Patient Seen in: [facility] Emergency Department      History     Chief Complaint   Patient presents with    Medication Administration     Stated Complaint: patient sts he accidentaly dropped his medicaitons into the toilet this morning.    Subjective:   HPI      43-year-old male presents to ED requesting torsemide 40 mg twice daily prescription as he states that he has slowly knocked over the pill jar and the pills fell into the toilet.    Objective:     Past Medical History:    CHF (congestive heart failure) (HCC)    Cocaine abuse (HCC)    Dilated cardiomyopathy (HCC)    Nicotine dependence    Noncompliance with medication regimen    Sleep apnea    Uses LifeVest defibrillator    In 2022              Past Surgical History:   Procedure Laterality Date    Removal gallbladder                  Social History     Socioeconomic History    Marital status:    Tobacco Use    Smoking status: Every Day     Current packs/day: 1.00     Types: Cigarettes    Smokeless tobacco: Never   Vaping Use    Vaping status: Every Day   Substance and Sexual Activity    Alcohol use: Yes     Alcohol/week: 35.0 standard drinks of alcohol     Types: 35 Cans of beer per week    Drug use: Yes     Frequency: 7.0 times per week     Types: Cocaine, Cannabis     Comment: daily     Social Drivers of Health     Financial Resource Strain: Not on File (12/19/2022)    Received from [facility]    Financial Resource Strain     Financial Resource Strain: 0   Food Insecurity: Not on File (9/26/2024)    Received from [facility]    Food Insecurity     Food: 0   Transportation Needs: Not on File (12/19/2022)    Received from [facility]    Transportation Needs     Transportation: 0   Physical Activity: Not on File (12/19/2022)    Received from [facility]    Physical Activity     Physical Activity: 0   Stress: Not on File (12/19/2022)    Received from [facility]    Stress     Stress: 0   Social Connections: Not on File (9/13/2024)    Received from  [facility]    Social Connections     Connectedness: 0    Received from [facility]    Housing Stability                  Physical Exam     ED Triage Vitals   BP 12/13/24 1401 139/85   Pulse 12/13/24 1401 103   Resp 12/13/24 1401 18   Temp 12/13/24 1401 97 °F (36.1 °C)   Temp src 12/13/24 1401 Temporal   SpO2 12/13/24 1401 99 %   O2 Device 12/13/24 1413 None (Room air)       Current Vitals:   No data recorded      Physical Exam  Vital signs reviewed.  Nursing note reviewed.  Constitutional: Alert, well-appearing  Head: Normocephalic, atraumatic  Mouth: Moist  Eyes: Extraocular muscles intact, pupils equal  Skin: Warm and dry  Neuro: Alert, at baseline, no focal neuro deficit.    Psych: Mood normal          ED Course   Labs Reviewed - No data to display                MDM      Prescription given for Lasix 40 mg twice daily.  Reviewed prior notes and see that he was previously on this.  Discussed send follow-up with his PCP and/or cardiologist.        Medical Decision Making      Disposition and Plan     Clinical Impression:  1. Encounter for medication refill         Disposition:  Discharge  12/13/2024  2:22 pm    Follow-up:  your PCP    Go to  your appt Jan 2          Medications Prescribed:  Discharge Medication List as of 12/13/2024  2:23 PM              Supplementary Documentation:

## 2024-12-13 NOTE — ED INITIAL ASSESSMENT (HPI)
Pt states accidentally dropped his medications into the toilet this AM. Pt states it was torsemide, requesting a refill.

## 2025-01-03 ENCOUNTER — HOSPITAL ENCOUNTER (INPATIENT)
Facility: HOSPITAL | Age: 45
LOS: 1 days | Discharge: LEFT BEFORE DISCHARGE | End: 2025-01-04
Attending: STUDENT IN AN ORGANIZED HEALTH CARE EDUCATION/TRAINING PROGRAM | Admitting: HOSPITALIST
Payer: MEDICARE

## 2025-01-03 ENCOUNTER — APPOINTMENT (OUTPATIENT)
Dept: GENERAL RADIOLOGY | Facility: HOSPITAL | Age: 45
End: 2025-01-03
Payer: MEDICARE

## 2025-01-03 ENCOUNTER — HOSPITAL ENCOUNTER (INPATIENT)
Facility: HOSPITAL | Age: 45
LOS: 1 days | Discharge: LEFT BEFORE DISCHARGE | DRG: 291 | End: 2025-01-04
Attending: STUDENT IN AN ORGANIZED HEALTH CARE EDUCATION/TRAINING PROGRAM | Admitting: HOSPITALIST
Payer: MEDICARE

## 2025-01-03 ENCOUNTER — APPOINTMENT (OUTPATIENT)
Dept: GENERAL RADIOLOGY | Facility: HOSPITAL | Age: 45
DRG: 291 | End: 2025-01-03
Payer: MEDICARE

## 2025-01-03 DIAGNOSIS — I21.4 NSTEMI (NON-ST ELEVATED MYOCARDIAL INFARCTION) (HCC): ICD-10-CM

## 2025-01-03 DIAGNOSIS — I50.9 ACUTE ON CHRONIC CONGESTIVE HEART FAILURE, UNSPECIFIED HEART FAILURE TYPE (HCC): Primary | ICD-10-CM

## 2025-01-03 PROBLEM — I50.21 ACUTE HFREF (HEART FAILURE WITH REDUCED EJECTION FRACTION) (HCC): Status: ACTIVE | Noted: 2025-01-03

## 2025-01-03 LAB
ALBUMIN SERPL-MCNC: 3.6 G/DL (ref 3.2–4.8)
ALBUMIN/GLOB SERPL: 1 {RATIO} (ref 1–2)
ALP LIVER SERPL-CCNC: 81 U/L
ALT SERPL-CCNC: 25 U/L
ANION GAP SERPL CALC-SCNC: 10 MMOL/L (ref 0–18)
APTT PPP: 27.5 SECONDS (ref 23–36)
AST SERPL-CCNC: 34 U/L (ref ?–34)
ATRIAL RATE: 98 BPM
BASOPHILS # BLD AUTO: 0.09 X10(3) UL (ref 0–0.2)
BASOPHILS NFR BLD AUTO: 1.1 %
BILIRUB SERPL-MCNC: 1.3 MG/DL (ref 0.3–1.2)
BUN BLD-MCNC: 14 MG/DL (ref 9–23)
CALCIUM BLD-MCNC: 9.2 MG/DL (ref 8.7–10.4)
CHLORIDE SERPL-SCNC: 101 MMOL/L (ref 98–112)
CHOLEST SERPL-MCNC: 173 MG/DL (ref ?–200)
CO2 SERPL-SCNC: 29 MMOL/L (ref 21–32)
CREAT BLD-MCNC: 0.84 MG/DL
EGFRCR SERPLBLD CKD-EPI 2021: 110 ML/MIN/1.73M2 (ref 60–?)
EOSINOPHIL # BLD AUTO: 0.4 X10(3) UL (ref 0–0.7)
EOSINOPHIL NFR BLD AUTO: 4.7 %
ERYTHROCYTE [DISTWIDTH] IN BLOOD BY AUTOMATED COUNT: 13.8 %
EST. AVERAGE GLUCOSE BLD GHB EST-MCNC: 143 MG/DL (ref 68–126)
GLOBULIN PLAS-MCNC: 3.6 G/DL (ref 2–3.5)
GLUCOSE BLD-MCNC: 129 MG/DL (ref 70–99)
HBA1C MFR BLD: 6.6 % (ref ?–5.7)
HCT VFR BLD AUTO: 51.6 %
HDLC SERPL-MCNC: 38 MG/DL (ref 40–59)
HGB BLD-MCNC: 17.7 G/DL
IMM GRANULOCYTES # BLD AUTO: 0.03 X10(3) UL (ref 0–1)
IMM GRANULOCYTES NFR BLD: 0.4 %
INR BLD: 1.11 (ref 0.8–1.2)
LDLC SERPL CALC-MCNC: 111 MG/DL (ref ?–100)
LYMPHOCYTES # BLD AUTO: 2.47 X10(3) UL (ref 1–4)
LYMPHOCYTES NFR BLD AUTO: 29.1 %
MCH RBC QN AUTO: 30.7 PG (ref 26–34)
MCHC RBC AUTO-ENTMCNC: 34.3 G/DL (ref 31–37)
MCV RBC AUTO: 89.6 FL
MONOCYTES # BLD AUTO: 0.55 X10(3) UL (ref 0.1–1)
MONOCYTES NFR BLD AUTO: 6.5 %
NEUTROPHILS # BLD AUTO: 4.95 X10 (3) UL (ref 1.5–7.7)
NEUTROPHILS # BLD AUTO: 4.95 X10(3) UL (ref 1.5–7.7)
NEUTROPHILS NFR BLD AUTO: 58.2 %
NONHDLC SERPL-MCNC: 135 MG/DL (ref ?–130)
NT-PROBNP SERPL-MCNC: 1928 PG/ML (ref ?–125)
OSMOLALITY SERPL CALC.SUM OF ELEC: 292 MOSM/KG (ref 275–295)
P AXIS: 66 DEGREES
P-R INTERVAL: 166 MS
PLATELET # BLD AUTO: 189 10(3)UL (ref 150–450)
POTASSIUM SERPL-SCNC: 3.5 MMOL/L (ref 3.5–5.1)
PROT SERPL-MCNC: 7.2 G/DL (ref 5.7–8.2)
PROTHROMBIN TIME: 14.4 SECONDS (ref 11.6–14.8)
Q-T INTERVAL: 430 MS
QRS DURATION: 140 MS
QTC CALCULATION (BEZET): 548 MS
R AXIS: -18 DEGREES
RBC # BLD AUTO: 5.76 X10(6)UL
SODIUM SERPL-SCNC: 140 MMOL/L (ref 136–145)
T AXIS: 106 DEGREES
TRIGL SERPL-MCNC: 131 MG/DL (ref 30–149)
TROPONIN I SERPL HS-MCNC: 66 NG/L
TROPONIN I SERPL HS-MCNC: 66 NG/L
VENTRICULAR RATE: 98 BPM
VLDLC SERPL CALC-MCNC: 23 MG/DL (ref 0–30)
WBC # BLD AUTO: 8.5 X10(3) UL (ref 4–11)

## 2025-01-03 PROCEDURE — 99223 1ST HOSP IP/OBS HIGH 75: CPT | Performed by: HOSPITALIST

## 2025-01-03 PROCEDURE — 71045 X-RAY EXAM CHEST 1 VIEW: CPT | Performed by: STUDENT IN AN ORGANIZED HEALTH CARE EDUCATION/TRAINING PROGRAM

## 2025-01-03 RX ORDER — ECHINACEA PURPUREA EXTRACT 125 MG
1 TABLET ORAL
Status: DISCONTINUED | OUTPATIENT
Start: 2025-01-03 | End: 2025-01-05

## 2025-01-03 RX ORDER — PROCHLORPERAZINE EDISYLATE 5 MG/ML
5 INJECTION INTRAMUSCULAR; INTRAVENOUS EVERY 8 HOURS PRN
Status: DISCONTINUED | OUTPATIENT
Start: 2025-01-03 | End: 2025-01-05

## 2025-01-03 RX ORDER — ENOXAPARIN SODIUM 100 MG/ML
40 INJECTION SUBCUTANEOUS DAILY
Status: DISCONTINUED | OUTPATIENT
Start: 2025-01-04 | End: 2025-01-05

## 2025-01-03 RX ORDER — SPIRONOLACTONE 25 MG/1
25 TABLET ORAL DAILY
Status: DISCONTINUED | OUTPATIENT
Start: 2025-01-03 | End: 2025-01-05

## 2025-01-03 RX ORDER — FUROSEMIDE 10 MG/ML
40 INJECTION INTRAMUSCULAR; INTRAVENOUS ONCE
Status: COMPLETED | OUTPATIENT
Start: 2025-01-03 | End: 2025-01-03

## 2025-01-03 RX ORDER — POTASSIUM CHLORIDE 1500 MG/1
40 TABLET, EXTENDED RELEASE ORAL EVERY 4 HOURS
Status: COMPLETED | OUTPATIENT
Start: 2025-01-03 | End: 2025-01-04

## 2025-01-03 RX ORDER — ONDANSETRON 2 MG/ML
4 INJECTION INTRAMUSCULAR; INTRAVENOUS EVERY 6 HOURS PRN
Status: DISCONTINUED | OUTPATIENT
Start: 2025-01-03 | End: 2025-01-03

## 2025-01-03 RX ORDER — CARVEDILOL 12.5 MG/1
25 TABLET ORAL 2 TIMES DAILY WITH MEALS
Status: DISCONTINUED | OUTPATIENT
Start: 2025-01-03 | End: 2025-01-04

## 2025-01-03 RX ORDER — FUROSEMIDE 10 MG/ML
40 INJECTION INTRAMUSCULAR; INTRAVENOUS
Status: DISCONTINUED | OUTPATIENT
Start: 2025-01-03 | End: 2025-01-05

## 2025-01-03 RX ORDER — POLYETHYLENE GLYCOL 3350 17 G/17G
17 POWDER, FOR SOLUTION ORAL DAILY PRN
Status: DISCONTINUED | OUTPATIENT
Start: 2025-01-03 | End: 2025-01-05

## 2025-01-03 RX ORDER — SODIUM PHOSPHATE, DIBASIC AND SODIUM PHOSPHATE, MONOBASIC 7; 19 G/230ML; G/230ML
1 ENEMA RECTAL ONCE AS NEEDED
Status: DISCONTINUED | OUTPATIENT
Start: 2025-01-03 | End: 2025-01-05

## 2025-01-03 RX ORDER — FUROSEMIDE 10 MG/ML
40 INJECTION INTRAMUSCULAR; INTRAVENOUS 3 TIMES DAILY
Status: CANCELLED | OUTPATIENT
Start: 2025-01-03

## 2025-01-03 RX ORDER — ACETAMINOPHEN 500 MG
1000 TABLET ORAL EVERY 4 HOURS PRN
Status: DISCONTINUED | OUTPATIENT
Start: 2025-01-03 | End: 2025-01-05

## 2025-01-03 RX ORDER — BISACODYL 10 MG
10 SUPPOSITORY, RECTAL RECTAL
Status: DISCONTINUED | OUTPATIENT
Start: 2025-01-03 | End: 2025-01-05

## 2025-01-03 RX ORDER — LOSARTAN POTASSIUM 100 MG/1
100 TABLET ORAL DAILY
Status: DISCONTINUED | OUTPATIENT
Start: 2025-01-04 | End: 2025-01-05

## 2025-01-03 RX ORDER — SENNOSIDES 8.6 MG
17.2 TABLET ORAL NIGHTLY PRN
Status: DISCONTINUED | OUTPATIENT
Start: 2025-01-03 | End: 2025-01-05

## 2025-01-03 NOTE — ED INITIAL ASSESSMENT (HPI)
Pt reports he ran out of his torsemide pills last night and wasn't able to take any today. Pt says he feels short of breath. Pt says he was seen here in December and was given a script then, and he did not have a PCP in place. Pt will be seeing his soon to be PCP in 2 weeks to get a refill. Pt denies any swelling. Pt denies cough/fever/chest pain/n/v/d.

## 2025-01-04 ENCOUNTER — APPOINTMENT (OUTPATIENT)
Dept: CV DIAGNOSTICS | Facility: HOSPITAL | Age: 45
End: 2025-01-04
Attending: HOSPITALIST
Payer: MEDICARE

## 2025-01-04 ENCOUNTER — APPOINTMENT (OUTPATIENT)
Dept: CV DIAGNOSTICS | Facility: HOSPITAL | Age: 45
DRG: 291 | End: 2025-01-04
Attending: HOSPITALIST
Payer: MEDICARE

## 2025-01-04 VITALS
DIASTOLIC BLOOD PRESSURE: 67 MMHG | BODY MASS INDEX: 35.92 KG/M2 | SYSTOLIC BLOOD PRESSURE: 98 MMHG | HEIGHT: 72 IN | OXYGEN SATURATION: 86 % | TEMPERATURE: 98 F | WEIGHT: 265.19 LBS | HEART RATE: 124 BPM | RESPIRATION RATE: 40 BRPM

## 2025-01-04 LAB
ALBUMIN SERPL-MCNC: 3.4 G/DL (ref 3.2–4.8)
ALBUMIN/GLOB SERPL: 1.2 {RATIO} (ref 1–2)
ALP LIVER SERPL-CCNC: 74 U/L
ALT SERPL-CCNC: 25 U/L
ANION GAP SERPL CALC-SCNC: 10 MMOL/L (ref 0–18)
AST SERPL-CCNC: 33 U/L (ref ?–34)
BASOPHILS # BLD AUTO: 0.07 X10(3) UL (ref 0–0.2)
BASOPHILS NFR BLD AUTO: 0.9 %
BILIRUB SERPL-MCNC: 1.3 MG/DL (ref 0.3–1.2)
BUN BLD-MCNC: 17 MG/DL (ref 9–23)
CALCIUM BLD-MCNC: 9.2 MG/DL (ref 8.7–10.4)
CHLORIDE SERPL-SCNC: 102 MMOL/L (ref 98–112)
CO2 SERPL-SCNC: 29 MMOL/L (ref 21–32)
CREAT BLD-MCNC: 1.08 MG/DL
EGFRCR SERPLBLD CKD-EPI 2021: 86 ML/MIN/1.73M2 (ref 60–?)
EOSINOPHIL # BLD AUTO: 0.34 X10(3) UL (ref 0–0.7)
EOSINOPHIL NFR BLD AUTO: 4.6 %
ERYTHROCYTE [DISTWIDTH] IN BLOOD BY AUTOMATED COUNT: 13.7 %
GLOBULIN PLAS-MCNC: 2.9 G/DL (ref 2–3.5)
GLUCOSE BLD-MCNC: 167 MG/DL (ref 70–99)
GLUCOSE BLD-MCNC: 208 MG/DL (ref 70–99)
HCT VFR BLD AUTO: 46 %
HGB BLD-MCNC: 15.5 G/DL
IMM GRANULOCYTES # BLD AUTO: 0.03 X10(3) UL (ref 0–1)
IMM GRANULOCYTES NFR BLD: 0.4 %
LYMPHOCYTES # BLD AUTO: 1.33 X10(3) UL (ref 1–4)
LYMPHOCYTES NFR BLD AUTO: 17.9 %
MAGNESIUM SERPL-MCNC: 2 MG/DL (ref 1.6–2.6)
MCH RBC QN AUTO: 30.9 PG (ref 26–34)
MCHC RBC AUTO-ENTMCNC: 33.7 G/DL (ref 31–37)
MCV RBC AUTO: 91.8 FL
MONOCYTES # BLD AUTO: 0.42 X10(3) UL (ref 0.1–1)
MONOCYTES NFR BLD AUTO: 5.7 %
NEUTROPHILS # BLD AUTO: 5.22 X10 (3) UL (ref 1.5–7.7)
NEUTROPHILS # BLD AUTO: 5.22 X10(3) UL (ref 1.5–7.7)
NEUTROPHILS NFR BLD AUTO: 70.5 %
OSMOLALITY SERPL CALC.SUM OF ELEC: 297 MOSM/KG (ref 275–295)
PLATELET # BLD AUTO: 178 10(3)UL (ref 150–450)
POTASSIUM SERPL-SCNC: 3.1 MMOL/L (ref 3.5–5.1)
POTASSIUM SERPL-SCNC: 3.1 MMOL/L (ref 3.5–5.1)
PROT SERPL-MCNC: 6.3 G/DL (ref 5.7–8.2)
RBC # BLD AUTO: 5.01 X10(6)UL
SODIUM SERPL-SCNC: 141 MMOL/L (ref 136–145)
TROPONIN I SERPL HS-MCNC: 119 NG/L
TROPONIN I SERPL HS-MCNC: 53 NG/L
WBC # BLD AUTO: 7.4 X10(3) UL (ref 4–11)

## 2025-01-04 PROCEDURE — 99239 HOSP IP/OBS DSCHRG MGMT >30: CPT | Performed by: HOSPITALIST

## 2025-01-04 PROCEDURE — 93306 TTE W/DOPPLER COMPLETE: CPT | Performed by: HOSPITALIST

## 2025-01-04 NOTE — PROGRESS NOTES
01/04/25 1422 01/04/25 1424 01/04/25 1426   Vitals   BP 93/66 99/82 (!) 130/112   MAP (mmHg) 75 90 (!) 118   BP Location Left arm Left arm Left arm   BP Method Automatic Automatic Automatic   Patient Position Lying Sitting Standing

## 2025-01-04 NOTE — DIETARY NOTE
[facility]   part of [facility]   CLINICAL NUTRITION    [facility]     Admitting diagnosis:  NSTEMI (non-ST elevated myocardial infarction) (HCC) [I21.4]  Acute on chronic congestive heart failure, unspecified heart failure type (HCC) [I50.9]    Ht: 182.9 cm (6')  Wt: 120.3 kg (265 lb 3.2 oz).   Body mass index is 35.97 kg/m².  IBW: 80.9 kg    Wt Readings from Last 6 Encounters:   01/03/25 120.3 kg (265 lb 3.2 oz)   12/13/24 123.4 kg (272 lb)   11/17/24 123.4 kg (272 lb)   09/05/24 122.5 kg (270 lb)   08/20/24 123.4 kg (272 lb)   09/28/23 116.1 kg (256 lb)        Labs/Meds reviewed    Diet:       Procedures    Cardiac diet Sodium Restriction: 2 GM NA; Fluid Restriction: 2000 ml; Is Patient on Accuchecks? No     Percent Meals Eaten (last 3 days)       None            Pt chart reviewed d/t HF.  Patient reports good appetite at this time.  No intake yet documented this admission  Tolerating po diet without diarrhea, emesis, or constipation.   No significant weight changes noted.     PMH includes HF, Substance Abuse. Pt p/w Acute on Chronic Congestive HF.  RD received consult for HF.  Pt has received education in the past.  At time of visit, pt undergoing testing. Per notes, pt noted to be noncompliant.  No n/v/d. Last BM PTA.   Wt changes noted - being diuresed. RD will follow per protocol. Please consult if pt would like additional education.      Patient is at low nutrition risk at this time.    Please consult if patient status changes or nutrition issues arise.    Esthela Myers RD, LDN, [facility]  Clinical Dietitian  Phone [phone]

## 2025-01-04 NOTE — CONSULTS
[facility]   Cardiology Consultation    Adonis Gold Patient Status:  Inpatient    1980 MRN [id number]   Location [facility] 3NE-A Attending Dionicio Elena MD   Hosp Day # 1 PCP None Pcp     Date of Admission:  1/3/2025  Date of Consult:  2025  Reason for Consultation:   HF    History of Present Illness:   Patient is a 44-year-old male with a history of nonischemic cardiomyopathy with an EF of 20-25%, HTN, and SHERRY who presents with mild shortness of breath after running torsemide.  Was diagnosed with heart failure several years ago, had invasive coronary angiogram which noted normal coronary arteries at that time.  Apparently was discharged with LifeVest but decided against wearing it.  He also has a known history of regular tobacco, alcohol, cocaine, and marijuana use.  Has not seen a cardiologist in over a year.  Was taking torsemide 40 mg twice daily but ran out few days ago.  Since then developed mild increased shortness of breath.  Received IV Lasix overnight and feeling much better today.  Takes losartan 100 mg daily and spironolactone 25 mg daily at home, not on carvedilol due to frequent cocaine use.    Troponin 66-66-53  proBNP   Creatinine 0.4    CXR-mild vascular congestion  ECG-sinus rhythm, PVCs, left bundle branch block, left atrial enlargement    Cardiac history:  NICM, EF 20-25%  HTN  SHERRY    Past Medical History  Past Medical History:    CHF (congestive heart failure) (HCC)    Cocaine abuse (HCC)    Dilated cardiomyopathy (HCC)    Nicotine dependence    Noncompliance with medication regimen    Sleep apnea    Uses LifeVest defibrillator    In      Past Surgical History  Past Surgical History:   Procedure Laterality Date    Removal gallbladder       Family History  Family History   Problem Relation Age of Onset    Diabetes Father     Cancer Mother     Heart Disorder Maternal Grandfather     Diabetes Sister      Social History  Lives at home with his wife and  kids.  Unemployed and on disability.  Smokes half pack per day.  Has 4 beers on a daily basis when he plays pool.  Smokes marijuana regularly.  Does cocaine 3 days a week.  Pediatric History   Patient Parents    Not on file     Other Topics Concern    Not on file   Social History Narrative    Not on file         Current Medications:  Current Facility-Administered Medications   Medication Dose Route Frequency    carvedilol (Coreg) tab 25 mg  25 mg Oral BID with meals    losartan (Cozaar) tab 100 mg  100 mg Oral Daily    spironolactone (Aldactone) tab 25 mg  25 mg Oral Daily    acetaminophen (Tylenol Extra Strength) tab 1,000 mg  1,000 mg Oral Q4H PRN    melatonin tab 3 mg  3 mg Oral Nightly PRN    glycerin-hypromellose- (Artificial Tears) 0.2-0.2-1 % ophthalmic solution 1 drop  1 drop Both Eyes QID PRN    sodium chloride (Saline Mist) 0.65 % nasal solution 1 spray  1 spray Each Nare Q3H PRN    enoxaparin (Lovenox) 40 MG/0.4ML SUBQ injection 40 mg  40 mg Subcutaneous Daily    prochlorperazine (Compazine) 10 MG/2ML injection 5 mg  5 mg Intravenous Q8H PRN    polyethylene glycol (PEG 3350) (Miralax) 17 g oral packet 17 g  17 g Oral Daily PRN    sennosides (Senokot) tab 17.2 mg  17.2 mg Oral Nightly PRN    bisacodyl (Dulcolax) 10 MG rectal suppository 10 mg  10 mg Rectal Daily PRN    fleet enema (Fleet) rectal enema 133 mL  1 enema Rectal Once PRN    furosemide (Lasix) 10 mg/mL injection 40 mg  40 mg Intravenous BID (Diuretic)     Medications Prior to Admission   Medication Sig    torsemide 20 MG Oral Tab Take 2 tablets (40 mg total) by mouth in the morning and 2 tablets (40 mg total) before bedtime.    torsemide 20 MG Oral Tab Take 2 tablets (40 mg total) by mouth in the morning and 2 tablets (40 mg total) before bedtime.    torsemide 20 MG Oral Tab Take 1 tablet (20 mg total) by mouth in the morning and 1 tablet (20 mg total) before bedtime.    acetaminophen 500 MG Oral Tab Take 1 tablet (500 mg total) by mouth  every 6 (six) hours as needed for Pain.    [] torsemide 20 MG Oral Tab Take 2 tablets (40 mg total) by mouth in the morning and 2 tablets (40 mg total) before bedtime.    carvedilol 25 MG Oral Tab Take 1 tablet (25 mg total) by mouth 2 (two) times daily with meals.    losartan 100 MG Oral Tab Take 1 tablet (100 mg total) by mouth daily.    spironolactone 25 MG Oral Tab Take 1 tablet (25 mg total) by mouth daily.    Blood Pressure Monitoring (RELION BLOOD PRESSURE MONITOR) Does not apply Kit Check blood pressure twice daily or as directed.     Allergies  Allergies[1]    Review of Systems:     14 point review of systems completed and negative except as noted.    Physical Exam:   Patient Vitals for the past 24 hrs:   BP Temp Temp src Pulse Resp SpO2 Height Weight   25 0500 92/67 98 °F (36.7 °C) Oral 113 18 96 % -- --   25 0000 95/73 97.6 °F (36.4 °C) Oral 84 18 97 % -- --   25 2035 (!) 111/92 97.5 °F (36.4 °C) Oral 102 18 97 % -- 265 lb 3.2 oz (120.3 kg)   25 1900 98/78 -- -- 92 22 95 % -- --   25 1830 (!) 108/93 -- -- 88 21 96 % -- --   25 1800 (!) 127/102 -- -- 102 24 96 % -- --   25 1623 (!) 134/91 96.7 °F (35.9 °C) Temporal 98 24 98 % 6' (1.829 m) 270 lb (122.5 kg)     Intake/Output:   Last 3 shifts:   Intake/Output                   25 0700 - 25 0659 (Not Admitted) 25 07 - 25 0659 25 07 - 25 0659       Intake    P.O.  --  840  --    P.O. -- 840 --    Total Intake -- 840 --       Output    Urine  --  910  --    Urine -- 910 --    Total Output -- 910 --       Net I/O     -- -70 --          Scheduled Meds:    carvedilol  25 mg Oral BID with meals    losartan  100 mg Oral Daily    spironolactone  25 mg Oral Daily    enoxaparin  40 mg Subcutaneous Daily    furosemide  40 mg Intravenous BID (Diuretic)     General: Alert and oriented x 3. No apparent distress.   HEENT: Normocephalic, anicteric sclera, neck supple, no thyromegaly or  adenopathy.  Neck: No JVD, carotids 2+, no bruits.  Cardiac: Regular rate and rhythm. S1, S2 normal. No murmur, pericardial rub, S3, or extra cardiac sounds.  Lungs: Clear without wheezes, rales, rhonchi or dullness.  Normal excursions and effort.  Abdomen: Soft, non-tender. No organosplenomegally, mass or rebound, BS-present.  Extremities: Without clubbing or cyanosis. 1+ edema.    Neurologic: Alert and oriented, normal affect. No focal defects  Skin: Warm and dry.     Results:   Laboratory Data:  Lab Results   Component Value Date    WBC 7.4 01/04/2025    HGB 15.5 01/04/2025    HCT 46.0 01/04/2025    .0 01/04/2025    CREATSERUM 1.08 01/04/2025    BUN 17 01/04/2025     01/04/2025    K 3.1 (L) 01/04/2025    K 3.1 (L) 01/04/2025     01/04/2025    CO2 29.0 01/04/2025     (H) 01/04/2025    CA 9.2 01/04/2025    ALB 3.4 01/04/2025    ALKPHO 74 01/04/2025    TP 6.3 01/04/2025    AST 33 01/04/2025    ALT 25 01/04/2025    PTT 27.5 01/03/2025    INR 1.11 01/03/2025    PTP 14.4 01/03/2025    TSH 1.940 05/31/2022     09/16/2021    DDIMER 0.46 09/28/2023    MG 2.0 01/04/2025         Recent Labs   Lab 01/03/25  1633 01/04/25  0422   * 167*   BUN 14 17   CREATSERUM 0.84 1.08   CA 9.2 9.2    141   K 3.5 3.1*  3.1*    102   CO2 29.0 29.0     Recent Labs   Lab 01/03/25  1633 01/04/25  0422   RBC 5.76* 5.01   HGB 17.7* 15.5   HCT 51.6 46.0   MCV 89.6 91.8   MCH 30.7 30.9   MCHC 34.3 33.7   RDW 13.8 13.7   NEPRELIM 4.95 5.22   WBC 8.5 7.4   .0 178.0       No results for input(s): \"BNPML\" in the last 168 hours.    No results for input(s): \"TROP\", \"CK\" in the last 168 hours.    Imaging:  XR CHEST AP PORTABLE  (CPT=71045)    Result Date: 1/3/2025  CONCLUSION:  There is cardiomegaly and there are findings consistent with mild vascular congestion although improved since prior study.   LOCATION:  [facility]      Dictated by (CST): Zack Oscar MD on 1/03/2025 at 6:18 PM     Finalized  by (CST): Zack Oscar MD on 1/03/2025 at 6:18 PM        Impression:   Assessment:  Mild heart failure exacerbation after running out of home torsemide  NICM, EF 20-25%  HTN  SHERRY    Plan:  - Volume status close to baseline after diuresis IV Lasix, minimal lower extremity edema, states breathing back to normal  - Give another dose of IV Lasix, if feels well walking around later today can possibly discharge home  - Obtain transthoracic echocardiogram here prior to discharge  - Continue home losartan 100 mg daily, spironolactone 25 mg daily; may benefit from Entresto use if covered by insurance  - Not on carvedilol due to frequent regular cocaine use  - ECG with left bundle branch block and  ms, may benefit from BiV ICD if EF remains reduced on echocardiogram.  This can be worked up as outpatient.  - Will help arrange outpatient follow-up after discharge    Thank you for allowing me to participate in the care of your patient.    Edawr Gonzalez MD  [facility]  1/4/2025         [1] No Known Allergies

## 2025-01-04 NOTE — H&P
[facility]     Adonis Gold Patient Status:  Emergency    1980 MRN [id number]   Location [facility] EMERGENCY DEPARTMENT Attending Tai Oswald MD   Hosp Day # 0 PCP None Pcp     Chief Complaint:   Chief Complaint   Patient presents with    Difficulty Breathing       Subjective:    History of Present Illness:     Adonis Gold is a 44 year old male with a past medical history of cocaine-induced HFrEF and sleep apnea.  He he states that he has not had a PCP or seeing cardiology in a while.  He has been getting refills of his medications by visiting emergency room's.  He ran out of his torsemide last night.  He has had increasing shortness of breath and subsequently came to the emergency room here.    History/Other:    Past Medical History:  Past Medical History:    CHF (congestive heart failure) (HCC)    Cocaine abuse (HCC)    Dilated cardiomyopathy (HCC)    Nicotine dependence    Noncompliance with medication regimen    Sleep apnea    Uses LifeVest defibrillator    In      Past Surgical History:   Past Surgical History:   Procedure Laterality Date    Removal gallbladder        Family History:   Family History   Problem Relation Age of Onset    Diabetes Father     Cancer Mother     Heart Disorder Maternal Grandfather     Diabetes Sister      Social History:    reports that he has been smoking cigarettes. He has never used smokeless tobacco. He reports current alcohol use of about 35.0 standard drinks of alcohol per week. He reports current drug use. Frequency: 7.00 times per week. Drugs: Cocaine and Cannabis.     Allergies: Allergies[1]    Medications:  Medications Ordered Prior to Encounter[2]    Review of Systems:   A comprehensive review of systems was completed.    Pertinent positives and negatives noted in the HPI.    Objective:   Physical Exam:    BP (!) 108/93   Pulse 88   Temp 96.7 °F (35.9 °C) (Temporal)   Resp 21   Ht 6' (1.829 m)   Wt 270 lb  (122.5 kg)   SpO2 96%   BMI 36.62 kg/m²   General: No acute distress, Alert  Respiratory: +rhonchi  Cardiovascular: S1, S2. +JVD,   Abdomen: Soft, Non-tender, Non-distended, Positive bowel sounds  Neuro: No new focal deficits  Extremities: trace - 1+ LE  edema      Results:    Labs:      Labs Last 24 Hours:  Recent Labs   Lab 01/03/25  1633   WBC 8.5   HGB 17.7*   MCV 89.6   .0   INR 1.11       Recent Labs   Lab 01/03/25  1633   *   BUN 14   CREATSERUM 0.84   CA 9.2   ALB 3.6      K 3.5      CO2 29.0   ALKPHO 81   AST 34*   ALT 25   BILT 1.3*   TP 7.2       Estimated Creatinine Clearance: 121.9 mL/min (based on SCr of 0.84 mg/dL).    Recent Labs   Lab 01/03/25  1633   TROPHS 66*       Recent Labs   Lab 01/03/25  1633   PTP 14.4   INR 1.11       No results for input(s): \"TROP\", \"CK\" in the last 168 hours.      Imaging: Imaging data reviewed in Epic.    Assessment & Plan:      #Acute HFrEF EF: 20-25%  Repeat echo  IV  diuresis  Cont. 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      Plan of care discussed with ED physician      Dionicio Elena MD    Supplementary Documentation:     The 21st Century Cures Act makes medical notes like these available to patients in the interest of transparency. Please be advised this is a medical document. Medical documents are intended to carry relevant information, facts as evident, and the clinical opinion of the practitioner. The medical note is intended as peer to peer communication and may appear blunt or direct. It is written in medical language and may contain abbreviations or verbiage that are unfamiliar.                                          [1] No Known Allergies  [2]   No current facility-administered medications on file prior to encounter.     Current Outpatient Medications on File Prior to Encounter   Medication Sig Dispense Refill    carvedilol 25 MG Oral Tab Take 1 tablet (25 mg total) by mouth 2 (two) times daily with meals. 60 tablet 3    losartan 100 MG Oral Tab Take 1 tablet (100 mg total) by mouth daily. 30 tablet 5    spironolactone 25 MG Oral Tab Take 1 tablet (25 mg total) by mouth daily. 30 tablet 3    torsemide 20 MG Oral Tab Take 1 tablet (20 mg total) by mouth in the morning and 1 tablet (20 mg total) before bedtime. 60 tablet 0    torsemide 20 MG Oral Tab Take 2 tablets (40 mg total) by mouth in the morning and 2 tablets (40 mg total) before bedtime. 120 tablet 0    torsemide 20 MG Oral Tab Take 2 tablets (40 mg total) by mouth in the morning and 2 tablets (40 mg total) before bedtime. 120 tablet 0    Blood Pressure Monitoring (RELION BLOOD PRESSURE MONITOR) Does not apply Kit Check blood pressure twice daily or as directed. 1 kit 0    acetaminophen 500 MG Oral Tab Take 500 mg by mouth every 6 (six) hours as needed for Pain.

## 2025-01-04 NOTE — PROGRESS NOTES
[facility]   part of [facility]     Hospitalist Progress Note     Adonis Gold Patient Status:  Inpatient    1980 MRN [id number]   Formerly [facility] 3NE-A Attending Dionicio Elena MD   Hosp Day # 1 PCP None Pcp     Chief Complaint: FOL    Subjective:     Patient no sob, swelling better. Hypotensive, diaphoretic    Objective:    Review of Systems:   A comprehensive review of systems was completed; pertinent positive and negatives stated in subjective.    Vital signs:  Temp:  [97.5 °F (36.4 °C)-98.9 °F (37.2 °C)] 98.1 °F (36.7 °C)  Pulse:  [] 124  Resp:  [17-40] 40  BP: ()/() 98/67  SpO2:  [78 %-97 %] 86 %    Physical Exam:    General: No acute distress  Respiratory: No wheezes, no rhonchi  Cardiovascular: S1, S2, regular rate and rhythm  Abdomen: Soft, Non-tender, non-distended, positive bowel sounds  Neuro: No new focal deficits.   Extremities: No edema      Diagnostic Data:    Labs:  Recent Labs   Lab 252   WBC 8.5 7.4   HGB 17.7* 15.5   MCV 89.6 91.8   .0 178.0   INR 1.11  --        Recent Labs   Lab 25  0422   * 167*   BUN 14 17   CREATSERUM 0.84 1.08   CA 9.2 9.2   ALB 3.6 3.4    141   K 3.5 3.1*  3.1*    102   CO2 29.0 29.0   ALKPHO 81 74   AST 34* 33   ALT 25 25   BILT 1.3* 1.3*   TP 7.2 6.3       Estimated Glomerular Filtration Rate: 86.2 mL/min/1.73m2 (by CKD-EPI based on SCr of 1.08 mg/dL).    Recent Labs   Lab 25  0422 25  0833   TROPHS 66* 53 119*       Recent Labs   Lab 25  1633   PTP 14.4   INR 1.11                  Microbiology    No results found for this visit on 25.      Imaging: Reviewed in Epic.    Medications:    losartan  100 mg Oral Daily    spironolactone  25 mg Oral Daily    enoxaparin  40 mg Subcutaneous Daily    furosemide  40 mg Intravenous BID (Diuretic)       Assessment & Plan:      #Acute HFrEF EF: 20-25%  Repeat echo showed  decrease to 10-15%, g4dd  IV  diuresis  Cont. 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 Consider decrease arb if ok w/ cards     #Cocaine use  Counseled on cessation  Still using     #Trop elevation  -demand ischemia     #TB elevation  Likely due to congestion     #Erythrocytosis  Likely secondary  resolved     #Pre-DM  A1c 6.6    No dc today       Bob Garcia MD    Supplementary Documentation:     Quality:  DVT Mechanical Prophylaxis:   SCDs,    DVT Pharmacologic Prophylaxis   Medication    enoxaparin (Lovenox) 40 MG/0.4ML SUBQ injection 40 mg                Code Status: Not on file  Alexandra: No urinary catheter in place  Alexandra Duration (in days):   Central line:    CAIO: 1/5/2025    Discharge is dependent on: course  At this point Mr. Gold is expected to be discharge to: home    The 21st Century Cures Act makes medical notes like these available to patients in the interest of transparency. Please be advised this is a medical document. Medical documents are intended to carry relevant information, facts as evident, and the clinical opinion of the practitioner. The medical note is intended as peer to peer communication and may appear blunt or direct. It is written in medical language and may contain abbreviations or verbiage that are unfamiliar.              **Certification      PHYSICIAN Certification of Need for Inpatient Hospitalization - Initial Certification    Patient will require inpatient services that will reasonably be expected to span two midnight's based on the clinical documentation in H+P.   Based on patients current state of illness, I anticipate that, after discharge, patient will require TBD.

## 2025-01-04 NOTE — PLAN OF CARE
Adonis Gold Patient Status:  Inpatient    1980 MRN [id number]   Location [facility] 3NE-A Attending Dionicio Elena MD   Hosp Day # 0 PCP None Pcp       Cardiology Nocturnal APN Note    Page Received: Dr Oswald    HPI:     Patient is a 44 year old M with PMH of acute on chronic chf, mild trop elevation, dilated CM SHERRY cocain d/o  last seen in clinic 24.  Presents to ED with dyspnea, exertional, orthopnes no chest pain no LE edema per ED note.  He ran out of torsemide this am.       ED Clinical Course  EKG shows SR w/LBBB occ pvc noted    Lab:  trop  66 > 66 pBNP 1928   K 3.5 creat 0.84 WBC 8.5 H&H 17.7/51.6 Plt 189     Medications  Coreg 25 mg, lovenox 40 mg subcutaneous, furosemide 40 mg IV x 2, Klorcon 40 meq, spironolactone 25 mg in ED      Imaging   CxR  CONCLUSION:  There is cardiomegaly and there are findings consistent with mild vascular congestion although improved since prior study.     Assessment/Plan:  Trend trops if indicated with sign elevation obtain EKG   Likely demand ischemia due to ran out of diuretic, concern for compliance  Strict intake and output  Echocardiogram  Resume gdmt as able:  carvedilol 25 mg bid losartan 100 mg daily spironolactone 25 mg daily consider jardiance  CHF instruction  Importance of diet daily weights and medications   Continue to monitor overnight  - Formal cardiology consult to follow in AM.     Melania Hoffman NP  [facility]  1/3/2025  9:49 PM

## 2025-01-04 NOTE — ED QUICK NOTES
Orders for admission, patient is aware of plan and ready to go upstairs. Any questions, please call ED RN Orlando at extension [phone].     Patient Covid vaccination status: Unvaccinated     COVID Test Ordered in ED: None    COVID Suspicion at Admission: N/A    Running Infusions:  None    Mental Status/LOC at time of transport: Aox4    Other pertinent information:   CIWA score: N/A   NIH score:  N/A

## 2025-01-04 NOTE — ED PROVIDER NOTES
History     Chief Complaint   Patient presents with    Difficulty Breathing       HPI    44 year old male with history of congestive heart failure, dilated cardiomyopathy, sleep apnea, cocaine use disorder presents with dyspnea, exertional, orthopnea.  No chest pain.  No lower extremity swelling.  Partner at bedside reports that patient appears much more out of breath and diaphoretic when he uses stairs.  Patient states he ran out of his torsemide this morning.  He has been compliant with his other medicines.          Past Medical History:    CHF (congestive heart failure) (HCC)    Cocaine abuse (HCC)    Dilated cardiomyopathy (HCC)    Nicotine dependence    Noncompliance with medication regimen    Sleep apnea    Uses LifeVest defibrillator    In 2022       Past Surgical History:   Procedure Laterality Date    Removal gallbladder         Social History     Socioeconomic History    Marital status:    Tobacco Use    Smoking status: Every Day     Current packs/day: 1.00     Types: Cigarettes    Smokeless tobacco: Never   Vaping Use    Vaping status: Every Day   Substance and Sexual Activity    Alcohol use: Yes     Alcohol/week: 35.0 standard drinks of alcohol     Types: 35 Cans of beer per week     Comment: every other Day.    Drug use: Yes     Frequency: 7.0 times per week     Types: Cocaine, Cannabis     Comment: every other Day     Social Drivers of Health     Financial Resource Strain: Not on File (12/19/2022)    Received from [facility]    Financial Resource Strain     Financial Resource Strain: 0   Food Insecurity: No Food Insecurity (1/3/2025)    Food Insecurity     Food Insecurity: Never true   Transportation Needs: No Transportation Needs (1/3/2025)    Transportation Needs     Lack of Transportation: No   Physical Activity: Not on File (12/19/2022)    Received from [facility]    Physical Activity     Physical Activity: 0   Stress: Not on File (12/19/2022)    Received from [facility]    Stress      Stress: 0   Social Connections: Not on File (9/13/2024)    Received from [facility] [facility]    Social [employer]     Connectedness: 0   Housing Stability: Low Risk  (1/3/2025)    Housing Stability     Housing Instability: No                   Physical Exam     ED Triage Vitals [01/03/25 1623]   BP (!) 134/91   Pulse 98   Resp 24   Temp 96.7 °F (35.9 °C)   Temp src Temporal   SpO2 98 %   O2 Device None (Room air)       Physical Exam  Constitutional:       General: He is not in acute distress.  Eyes:      Extraocular Movements: Extraocular movements intact.   Cardiovascular:      Rate and Rhythm: Normal rate.      Pulses: Normal pulses.   Pulmonary:      Effort: Pulmonary effort is normal. No respiratory distress.      Breath sounds: Rales present.   Musculoskeletal:      Cervical back: Normal range of motion.      Right lower leg: Edema present.      Left lower leg: Edema present.   Neurological:      General: No focal deficit present.      Mental Status: He is alert.              ED Course     Labs Reviewed   COMP METABOLIC PANEL (14) - Abnormal; Notable for the following components:       Result Value    Glucose 129 (*)     AST 34 (*)     Bilirubin, Total 1.3 (*)     Globulin  3.6 (*)     All other components within normal limits   CBC WITH DIFFERENTIAL WITH PLATELET - Abnormal; Notable for the following components:    RBC 5.76 (*)     HGB 17.7 (*)     All other components within normal limits   TROPONIN I HIGH SENSITIVITY - Abnormal; Notable for the following components:    Troponin I (High Sensitivity) 66 (*)     All other components within normal limits   PRO BETA NATRIURETIC PEPTIDE - Abnormal; Notable for the following components:    Pro-Beta Natriuretic Peptide 1,928 (*)     All other components within normal limits   LIPID PANEL - Abnormal; Notable for the following components:    HDL Cholesterol 38 (*)     LDL Cholesterol 111 (*)     Non HDL Chol 135 (*)     All other components within normal limits   TROPONIN I  HIGH SENSITIVITY - Abnormal; Notable for the following components:    Troponin I (High Sensitivity) 66 (*)     All other components within normal limits   HEMOGLOBIN A1C - Abnormal; Notable for the following components:    HgbA1C 6.6 (*)     Estimated Average Glucose 143 (*)     All other components within normal limits   PROTHROMBIN TIME (PT) - Normal   PTT, ACTIVATED - Normal   RAINBOW DRAW LAVENDER   RAINBOW DRAW LIGHT GREEN   RAINBOW DRAW BLUE     XR CHEST AP PORTABLE  (CPT=71045)    Result Date: 1/3/2025  CONCLUSION:  There is cardiomegaly and there are findings consistent with mild vascular congestion although improved since prior study.   LOCATION:  [facility]      Dictated by (CST): Zack Oscar MD on 1/03/2025 at 6:18 PM     Finalized by (CST): Zack Oscar MD on 1/03/2025 at 6:18 PM            MDM     Vitals:    01/03/25 1800 01/03/25 1830 01/03/25 1900 01/03/25 2035   BP: (!) 127/102 (!) 108/93 98/78 (!) 111/92   BP Location:    Left arm   Pulse: 102 88 92 102   Resp: 24 21 22 18   Temp:    97.5 °F (36.4 °C)   TempSrc:    Oral   SpO2: 96% 96% 95% 97%   Weight:    120.3 kg   Height:           Patient appears to be in a congestive heart failure exacerbation.  He is mentating and perfusing appropriately.  Last cocaine use was last night.  Blood pressure is slightly elevated.    ED Course as of 01/03/25 2118  ------------------------------------------------------------  Time: 01/03 1806  Comment: EKG interpretation by me: EKG sinus rhythm at a rate of 98, axis nomal, no concerning acute ischemic ST changes, lbbb appears similar to prior EKGs    ------------------------------------------------------------  Time: 01/03 1807  Comment: Labs with mild troponin elevation, BNP is elevated.  Labs otherwise with reassuring renal function  ------------------------------------------------------------  Time: 01/03 9436  Comment: My interpretation of chest x-ray with significant cardiomegaly and congestive changes     Will  start diuresis, discussed with cardiology and admit for further care.    Disposition and Plan     Clinical Impression:  1. Acute on chronic congestive heart failure, unspecified heart failure type ([facility])    2. NSTEMI (non-ST elevated myocardial infarction) ([facility])        Disposition:  Admit    Follow-up:  No follow-up provider specified.    Medications Prescribed:  Current Discharge Medication List          Hospital Problems       Present on Admission  Date Reviewed: 8/20/2024            ICD-10-CM Noted POA    * (Principal) Acute on chronic congestive heart failure, unspecified heart failure type ([facility]) I50.9 1/3/2025 Unknown    Acute HFrEF (heart failure with reduced ejection fraction) ([facility]) I50.21 1/3/2025 Unknown    NSTEMI (non-ST elevated myocardial infarction) ([facility]) I21.4 1/3/2025 Unknown

## 2025-01-04 NOTE — PROGRESS NOTES
NURSING ADMISSION NOTE      Patient admitted via Cart  Oriented to room.  Safety precautions initiated.  Bed in low position.  Call light in reach.  Navigator complete  Up to date w POC  Meds in MAR given  Continuing to meet pt needs  Need further details reference flowsheets

## 2025-01-04 NOTE — HISTORICAL OFFICE NOTE
Facility Logo [facility]  [address], 4th floor, [place] [address]  [phone]      Adonis Gold  Progress Note  Demographics:  Name: Adonis Gold YOB: 1980  Age: 42, Male Medical Record No: [id number]  Visited Date/Time: 05/15/2023 04:00 PM    Chief Complaints  Follow up dilated cardiomyopathy.  History of Present Illness  Adonis returns for follow-up.  Since his last visit with me, he did see Dr. Vela.  He has a follow-up appointment with him in the fall.  He is still smoking about a half a pack of cigarettes per day.  Unfortunately, he also continues to consume recreational cocaine, although he states he has cut down on this quite a bit as well.  Since his last visit with me, he has not been taking losartan.  He does not know why this medication came off of his list.  Cardiac risk factors Hypertension, Obesity and Current every day smoker  Past Medical History  1.Pre-procedure lab exam  2.Cardiomyopathy, dilated  3.Benign hypertension  4.CHF (congestive heart failure), NYHA class IV, acute, systolic  5.Hypokalemia  6.Hyperglycemia  7.Abdominal pain, right upper quadrant  8.Cholelithiasis with acute cholecystitis without biliary obstruction  9.Cholecystitis  10.Umbilical hernia without obstruction and without gangrene  11.Habitual snoring  12.Acute respiratory failure with hypoxia  13.Cocaine abuse  14.Cigarette nicotine dependence with nicotine-induced disorder  15.Uncomplicated alcohol dependence  16.Marijuana dependence  Past Surgical History  1.History of left heart catheterization (LHC)  2.History of hernia repair - Hx  3.History of cholecystectomy - Hx  Family History  1. Mother - Cancer (Reason for death)  Social History  Smoking status Current every day smoker  Tobacco usage - Yes (Moderate smoker (20 or less per day) (finding))  Alcohol usage - Yes (\"Weekends\" )  Drug abuse - Yes (\" Cocaine - 1 per week, Marijuana daily\" )  Review of  systems  Constitutional No history of Weight Loss  ENT No history of Bloody nose (epistaxis)  Gastrointestinal No history of Abdominal pain  Cardiovascular No history of Chest pain, KISER, Palpitations, Syncope, PND, Orthopnea, Edema and Claudication  Respiratory No history of SOB  Hem/Lymphatic No history of Easy bruising  Physical Examination  Vitals Left Arm Sitting  / 98 mmHg, Pulse rate 96 bpm, Height in 6', BMI: 35.8, Weight in 264 lbs (or) 120 kgs and BSA : 2.51 cc/m²  General Appearance No Acute Distress  Head/Eyes/Ears/Nose/Mouth/Throat Mucous membranes Moist, No icterus and Good Dentition  Neck Normal carotid pulsations, No carotid bruits and No JVD  Respiratory Unlabored and Lungs clear with normal breath sounds  Cardiovascular Intact distal pulses and Regular rhythm. Normal and normal S1 and S2    Gastrointestinal Abdomen soft, Non-tender, No organomegaly and No masses  Musculoskeletal Normal spine  Gait Normal gait  Upper Extremities No clubbing and No cyanosis  Lower Extremities Pulses 2+ and equal bilaterally and No edema  Skin Warm and dry  Neurologic / Psychiatric Alert and Oriented  Speech Normal speech  EKG/Other abnormalities  No recent labs to review.  Allergies  No known medication allergies.  Medications (Info obtained by: Verbal)  1.carvediloL 25 mg tablet, Take 1 tablet orally 2 times a day.  2.spironolactone 25 mg tablet, Take 1 tablet orally once a day.  3.torsemide 20 mg tablet, Take 1 tablet orally 2 times a day.  Impression  1.Cardiomyopathy, dilated  2.Benign hypertension  3.Hyperglycemia  4.Habitual snoring  5.Cocaine abuse  6.Cigarette nicotine dependence with nicotine-induced disorder  7.Uncomplicated alcohol dependence  8.Marijuana dependence  Assessment & Plan  Dilated cardiomyopathy, likely multifactorial.  NYHA class I, but continues to have severe LV dysfunction.  Dr. Vela told the patient he would like him to stop all illicit drugs if possible prior to putting an ICD in.   Follow-up with him as instructed.  Unclear why he is no longer taking losartan.  This explains his increase in his blood pressure compared to prior visits.  We will have him resume losartan 50 mg a day.  He remains overdue for blood work.  I asked him to get a CBC, CMP, FLP and A1c in approximately 2 weeks after resuming losartan.  Return for follow-up with me in 3 months, sooner as needed.  Follow-up with Dr. Armstrong in October as instructed by him.    Recommend low sodium diet, daily exercise and maintain a normal BMI. Recommend monitor blood pressure at home.    Increased BMI: Provide patient with information regarding diet and lifestyle changes.  Medications Ordered  1.losartan 50 mg tablet, Take 1 tablet orally once a day.  Labs and Diagnostics ordered  1.Hemoglobin A1c (3 Months)  2.Lipid panel - Fasting (3 Months)  3.CBC (Complete Blood Count) (3 Months)  4.CMP (comprehensive metabolic panel) (3 Months)  Future appointments  1.Follow up visit - Joseph Monroe (3 Months)  2.Follow up visit - Sho Shaw (1 Month)  Miscellaneous  1.Tobacco Cessation reviewed with the patient (Stop smoking monitoring verbal invite (procedure))  Nurses documentation  Refills: none  Surgery: none  Use of assistive devices(s): none  Lab orders sent to [facility], [phone]   (CMA, [facility])   Patient instructions  Plan:  Resume Losartan 50mg once daily  Follow up with a nurse practitioner   in 1 month   Follow up with Dr. Monroe in 3 months   Fasting labs (CMP/CBC/FLP/A1c) 1-2 weeks prior to follow up with Dr. Monroe     New Medication:   Your provider has ordered you to start Losartan 50mg once daily. A new prescription has been sent to your pharmacy. Please call with questions, concerns or side effects. Please call our office [phone] in the event that you need assistance.     Follow up:   Your provider has requested for you to follow up in 1 and 3 months .  An appointment will be made for you as you leave your visit.  It is always  important to bring all your medications including over the counter medications, vitamins and supplements to your doctor visits. This will assist in providing the best care for your condition. Please bring your insurance cards to your appointment.     Fasting Blood Work:   Your provider has ordered blood work  to be completed in (3 months).  You will need to be fasting, nothing to eat or drink after midnight prior to the blood work. You are allowed to take your medications with a sip of water   Lab Details  SARS-COV-2 BY PCR ([facility])  11/20/2022 12:45:15 PM  SARS-COV-2 BY PCR ([facility]) Not Detected Not Detected  F  BASIC METABOLIC PANEL (8)  11/19/2022 10:01:54 AM  GLUCOSE 150 70-99 mg/dL H F  SODIUM 137 136-145 mmol/L  F  POTASSIUM 3.6 3.5-5.1 mmol/L  F  CHLORIDE 99  mmol/L  F  CO2 30.0 21.0-32.0 mmol/L  F  ANION GAP 8 0-18 mmol/L  F  BUN 30 7-18 mg/dL H F  CREATININE 1.30 0.70-1.30 mg/dL  F  BUN/ CREAT RATIO 23.1 10.0-20.0 H F  CALCIUM 9.2 8.5-10.1 mg/dL  F  OSMOLALITY CALCULATED 293 275-295 mOsm/kg  F  E GFR CR 70 >=60 mL/min/1.73m2  F  FASTING PATIENT BMP ANSWER Yes   F  CBC W/ DIFFERENTIAL  11/19/2022 09:19:35 AM  WBC 10.0 4.0-11.0 x10(3) uL  F  RED BLOOD COUNT 5.31 4.30-5.70 x10(6)uL  F  HGB 16.6 13.0-17.5 g/dL  F  HCT 50.8 39.0-53.0 %  F  MEAN CELL VOLUME 95.7 80.0-100.0 fL  F  MEAN CORPUSCULAR HEMOGLOBIN 31.3 26.0-34.0 pg  F  MEAN CORPUSCULAR HGB CONC 32.7 31.0-37.0 g/dL  F  RED CELL DISTRIBUTION WIDTH-SD 44.7 35.1-46.3 fL  F  RED CELL DISTRIBUTION WIDTH CV 12.6 11.0-15.0 %  F  PLATELETS 294.0 150.0-450.0 10(3)uL  F  NEUTROPHIL ABS PRELIM 5.22 1.50-7.70 x10 (3) uL  F  NEUTROPHIL ABSOLUTE 5.22 1.50-7.70 x10(3) uL  F  LYMPHOCYTE ABSOLUTE 3.25 1.00-4.00 x10(3) uL  F  MONOCYTE ABSOLUTE 0.77 0.10-1.00 x10(3) uL  F  EOSINOPHIL ABSOLUTE 0.60 0.00-0.70 x10(3) uL  F  BASOPHIL ABSOLUTE 0.12 0.00-0.20 x10(3) uL  F  IMMATURE GRANULOCYTE COUNT 0.05 0.00-1.00 x10(3) uL  F  NEUTROPHIL % 52.1 %  F  LYMPHOCYTE  % 32.5 %  F  MONOCYTE % 7.7 %  F  EOSINOPHIL % 6.0 %  F  BASOPHIL % 1.2 %  F  IMMATURE GRANULOCYTE RATIO % 0.5 %  F  CPOE Orders carried out by: Joseph Monroe MD, Miguel MACK and Christy  Care Providers: Joseph Monroe MD, Miguel MACK and Christy  Electronically Authenticated by  Joseph Monroe MD  05/15/2023 05:52:24 PM  Disclaimer: Components of this note were documented using voice recognition system and are subject to errors not corrected at proofreading. Contact the author of this note for any clarifications.

## 2025-01-05 NOTE — PROGRESS NOTES
Risks/benefits/alternatives discussed with patient as applicable to reason for leaving AMA? Yes  Provider(s) contacted: Dr Chiu  Patient education/AVS/follow-up appointments/prescriptions given? no  AMA paperwork completed? yes  Removed IV access/decannulated port if applicable and patient belongings returned.    Documented from Admission Navigator:  Belongings at Bedside: None  Belongings Sent to Public Safety: None  Medications brought by patient?: No     Patient counseled by day shift RN, myself and charge nurse as to the risks of leaving against medical advise. Patient understood the risks and signed the AMA paperwork. Patients IV removed, Dr Chiu aware. Patient understood we cared for his safety and encouraged him to stay. Patient transported down to [facility] and picked up by wife. Patient left at 2030. Wife picked patient up-she is aware he left against medical advise    Wife called and said I don't care about his safety or wellbeing. I begged patient to stay and told him we care for his safety and well being and that we can not force him to stay. Wife called my phone to say how unprofessional it was for him not to be sent without medication home and that I do not care about his safety. She did not understand why he could not be sent home with medication despite leaving against medical advise. She asked to speak to the doctor. I reached out to Dr. Chiu. Dr Chiu is aware.

## 2025-01-05 NOTE — PROGRESS NOTES
Brief Progress Note:     44 year old M with history of HFrEF, cocaine induced refusing diuresis, ICD. He is AxOx4 per report and understood risk of leaving and decided to leave AMA.    Sandra Chiu MD

## 2025-01-05 NOTE — PLAN OF CARE
Assumed patient care at 0730. A/Ox4. Room air, 2L depending if pt keeps nasal cannula on. 2L fluid restriction, 2L sodium restriction. Echo completed. IV lasix given per MAR. All safety precautions are in place and will continue with plan of care.    MD notified that pt feels like pt thinks meds we give him making him feel worse. Notified on echo results.    Problem: CARDIOVASCULAR - ADULT  Goal: Maintains optimal cardiac output and hemodynamic stability  Description: INTERVENTIONS:  - Monitor vital signs, rhythm, and trends  - Monitor for bleeding, hypotension and signs of decreased cardiac output  - Evaluate effectiveness of vasoactive medications to optimize hemodynamic stability  - Monitor arterial and/or venous puncture sites for bleeding and/or hematoma  - Assess quality of pulses, skin color and temperature  - Assess for signs of decreased coronary artery perfusion - ex. Angina  - Evaluate fluid balance, assess for edema, trend weights  Outcome: Progressing

## 2025-01-06 NOTE — DISCHARGE SUMMARY
[place] HOSPITALIST  DISCHARGE SUMMARY     Adonis Gold Patient Status:  Inpatient    1980 MRN [id number]   Location [facility] 3NE-A Attending No att. providers found   Hosp Day # 1 PCP None Pcp     Date of Admission: 1/3/2025  Date of Discharge:  2025     Discharge Disposition: Left Against Medical Advice    Discharge Diagnosis:  #Acute on chronic HFrEF EF: 20-25%  Repeat echo showed decrease to 10-15%, g4dd  IV  diuresis  Cont. 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  He he states that he has not had a PCP or seeing cardiology in a while.  He has been getting refills of his medications by visiting emergency room's.  He ran out of his torsemide last night.  He has had increasing shortness of breath and subsequently came to the emergency room here.       Brief Synopsis: pt w/ acute on chronic systolic CHF due to medical noncomplaince. Ran out of his diuretics. Has not been following up with cardiology or taking any of his meds other than diuretics. Is still using cocaine. Diuresed and improved. Echo showed drop to 10-15% EF. BP borderline. No metoprolol added due to cocaine use and risk of unopposed alpha stimulation. Needs a life vest and eventually AICD. I had a long conversation with pt regarding his need to stop substance abuse and follow with physicians as well as taking his meds. Discussed why the medications help and the risk of sudden death due to arrythmia w/o AICD.  Unfortunately, it seems pt not ready to change as he decided to leave AMA without meds overnight. RN was pleading with pt to stay but he refused. Wife upset that pt is leaving without medication. I do not think pt is likely to follow up closely as instructed or take his medications regardless. He is unlikely to stop his cocaine use and will likely have a poor outcome. A one month supply of meds is unlikely to make a difference if he does not follow up. Hopefully pt sees cards as outpt to initiate his medications and get AICD    Lace+ Score: 59  59-90 High Risk  29-58 Medium Risk  0-28   Low Risk  Patient was referred to the [facility].    TCM Follow-Up Recommendation:  LACE > 58: High Risk of readmission after discharge from the hospital.  **Certification    Admission date was 1/3/2025.  Inpatient stay was shorter than expected.  Patient's Acute on chronic congestive heart failure, unspecified heart failure type (HCC) was initially serious enough to expect a more lengthy hospitalization but patient improved faster than expected.                 Procedures during hospitalization:   none    Incidental or significant findings and recommendations (brief descriptions):  none    Lab/Test results pending at Discharge:   none    Consultants:  cards    Discharge Medication List:     Discharge Medications        CONTINUE taking these medications        Instructions Prescription details   acetaminophen 500 MG Tabs  Commonly known as: Tylenol Extra Strength      Take 1 tablet (500 mg total) by mouth every 6 (six) hours as needed for Pain.   Refills: 0     losartan 100 MG Tabs  Commonly known as: Cozaar      Take 1 tablet (100 mg total) by mouth daily.   Quantity: 30 tablet  Refills: 5     ReliOn Blood Pressure Monitor Kit      Check blood pressure twice daily or as directed.   Quantity: 1 kit  Refills: 0     spironolactone 25 MG Tabs  Commonly known as: Aldactone      Take 1 tablet (25 mg total) by mouth  daily.   Quantity: 30 tablet  Refills: 3            ASK your doctor about these medications        Instructions Prescription details   carvedilol 25 MG Tabs  Commonly known as: Coreg      Take 1 tablet (25 mg total) by mouth 2 (two) times daily with meals.   Quantity: 60 tablet  Refills: 3     torsemide 20 MG Tabs  Commonly known as: Demadex      Take 1 tablet (20 mg total) by mouth in the morning and 1 tablet (20 mg total) before bedtime.   Quantity: 60 tablet  Refills: 0     torsemide 20 MG Tabs  Commonly known as: Demadex  Ask about: Should I take this medication?      Take 2 tablets (40 mg total) by mouth in the morning and 2 tablets (40 mg total) before bedtime.   Stop taking on: 2024  Quantity: 120 tablet  Refills: 0     torsemide 20 MG Tabs  Commonly known as: Demadex      Take 2 tablets (40 mg total) by mouth in the morning and 2 tablets (40 mg total) before bedtime.   Quantity: 120 tablet  Refills: 0     torsemide 20 MG Tabs  Commonly known as: Demadex      Take 2 tablets (40 mg total) by mouth in the morning and 2 tablets (40 mg total) before bedtime.   Stop taking on: 2025  Quantity: 120 tablet  Refills: 0              [place]    Follow-up appointment:   Pcp, None  [place] [address]    Schedule an appointment as soon as possible for a visit      Appointments for Next 30 Days 2025 - 2025      None            Vital signs:       Physical Exam:    General: No acute distress   Lungs: clear to auscultation  Cardiovascular: S1, S2  Abdomen: Soft      -----------------------------------------------------------------------------------------------  PATIENT DISCHARGE INSTRUCTIONS: See electronic chart    Bob Garcia MD    Total time spent on discharge plannin minutes     The  Cures Act makes medical notes like these available to patients in the interest of transparency. Please be advised this is a medical document. Medical documents are intended to  carry relevant information, facts as evident, and the clinical opinion of the practitioner. The medical note is intended as peer to peer communication and may appear blunt or direct. It is written in medical language and may contain abbreviations or verbiage that are unfamiliar.

## 2025-01-08 ENCOUNTER — PATIENT OUTREACH (OUTPATIENT)
Dept: CASE MANAGEMENT | Age: 45
End: 2025-01-08

## 2025-01-08 NOTE — PROGRESS NOTES
Hospital follow up.    TCC request.    Attempt #1:  Left message on voicemail for patient to call transitions specialist back to schedule follow up appointments. Provided Transitions specialist scheduling phone number [phone].

## 2025-01-09 NOTE — PROGRESS NOTES
Hospital follow up.    TCC request.    Attempt #2:  Left message on voicemail for patient to call transitions specialist back to schedule follow up appointments. Provided Transitions specialist scheduling phone number [phone].

## 2025-01-10 NOTE — PROGRESS NOTES
Hospital follow up.    TCC request.    Attempt #3:  Left message on voicemail for patient to call transitions specialist back to schedule follow up appointments. Provided Transitions specialist scheduling phone number [phone]. Closing encounter. Will re-open if patient returns call.

## 2025-01-14 NOTE — PROGRESS NOTES
Provider Clarification     Additional information about the patient's documented myocardial infarction.    Elevated Troponins due to demand ischemia with MI ruled out     This note is part of the patient's medical record.

## 2025-07-25 ENCOUNTER — APPOINTMENT (OUTPATIENT)
Dept: GENERAL RADIOLOGY | Facility: HOSPITAL | Age: 45
End: 2025-07-25
Attending: EMERGENCY MEDICINE

## 2025-07-25 ENCOUNTER — APPOINTMENT (OUTPATIENT)
Dept: CV DIAGNOSTICS | Facility: HOSPITAL | Age: 45
End: 2025-07-25
Attending: EMERGENCY MEDICINE

## 2025-07-25 ENCOUNTER — HOSPITAL ENCOUNTER (INPATIENT)
Facility: HOSPITAL | Age: 45
LOS: 19 days | Discharge: HOME HEALTH | End: 2025-08-13
Attending: EMERGENCY MEDICINE | Admitting: HOSPITALIST

## 2025-07-25 ENCOUNTER — APPOINTMENT (OUTPATIENT)
Dept: CT IMAGING | Facility: HOSPITAL | Age: 45
End: 2025-07-25
Attending: EMERGENCY MEDICINE

## 2025-07-25 DIAGNOSIS — E87.20 METABOLIC ACIDOSIS: Primary | ICD-10-CM

## 2025-07-25 DIAGNOSIS — I50.9 MULTI-ORGAN FAILURE WITH HEART FAILURE (HCC): ICD-10-CM

## 2025-07-25 DIAGNOSIS — E83.51 HYPOCALCEMIA: ICD-10-CM

## 2025-07-25 DIAGNOSIS — E16.2 HYPOGLYCEMIA: ICD-10-CM

## 2025-07-25 DIAGNOSIS — R06.03 RESPIRATORY DISTRESS: ICD-10-CM

## 2025-07-25 DIAGNOSIS — I42.7 CARDIOMYOPATHY SECONDARY TO DRUG (HCC): ICD-10-CM

## 2025-07-25 PROBLEM — N17.9 ACUTE KIDNEY INJURY: Status: ACTIVE | Noted: 2025-07-25

## 2025-07-25 PROBLEM — R57.9 SHOCK (HCC): Status: ACTIVE | Noted: 2025-07-25

## 2025-07-25 PROBLEM — J96.01 ACUTE HYPOXIC RESPIRATORY FAILURE (HCC): Status: ACTIVE | Noted: 2025-07-25

## 2025-07-25 PROBLEM — I50.23 ACUTE ON CHRONIC SYSTOLIC HEART FAILURE (HCC): Status: ACTIVE | Noted: 2025-01-03

## 2025-07-25 PROBLEM — A41.9 SEPSIS (HCC): Status: ACTIVE | Noted: 2025-07-25

## 2025-07-25 PROBLEM — K72.00 SHOCK LIVER (HCC): Status: ACTIVE | Noted: 2025-07-25

## 2025-07-25 PROBLEM — E87.5 HYPERKALEMIA: Status: ACTIVE | Noted: 2025-07-25

## 2025-07-25 PROBLEM — E87.1 HYPONATREMIA: Status: ACTIVE | Noted: 2025-07-25

## 2025-07-25 PROBLEM — N17.9 ACUTE RENAL FAILURE (ARF): Status: ACTIVE | Noted: 2025-07-25

## 2025-07-25 PROBLEM — D72.829 LEUKOCYTOSIS: Status: ACTIVE | Noted: 2025-07-25

## 2025-07-25 LAB
ALBUMIN SERPL-MCNC: 4 G/DL (ref 3.2–4.8)
ALBUMIN/GLOB SERPL: 0.9 (ref 1–2)
ALP LIVER SERPL-CCNC: 136 U/L (ref 45–117)
ALT SERPL-CCNC: 50 U/L (ref 10–49)
ANION GAP SERPL CALC-SCNC: 16 MMOL/L (ref 0–18)
ANION GAP SERPL CALC-SCNC: 28 MMOL/L (ref 0–18)
ANTIBODY SCREEN: NEGATIVE
APTT PPP: 28.4 SECONDS (ref 23–36)
AST SERPL-CCNC: 108 U/L (ref ?–34)
BASE EXCESS BLDA CALC-SCNC: -11.2 MMOL/L (ref ?–2)
BASE EXCESS BLDA CALC-SCNC: -17.6 MMOL/L (ref ?–2)
BASE EXCESS BLDV CALC-SCNC: -11
BASE EXCESS BLDV CALC-SCNC: -18.8
BASOPHILS # BLD AUTO: 0.1 X10(3) UL (ref 0–0.2)
BASOPHILS NFR BLD AUTO: 0.7 %
BILIRUB SERPL-MCNC: 2 MG/DL (ref 0.3–1.2)
BODY TEMPERATURE: 98.6 F
BODY TEMPERATURE: 98.6 F
BUN BLD-MCNC: 38 MG/DL (ref 9–23)
BUN BLD-MCNC: 41 MG/DL (ref 9–23)
CA-I BLD-SCNC: 1.1 MMOL/L (ref 0.95–1.32)
CA-I BLD-SCNC: 1.1 MMOL/L (ref 0.95–1.32)
CA-I BLD-SCNC: 1.11 MMOL/L (ref 0.95–1.32)
CA-I BLD-SCNC: 1.12 MMOL/L (ref 0.95–1.32)
CALCIUM BLD-MCNC: 8.5 MG/DL (ref 8.7–10.6)
CALCIUM BLD-MCNC: 9.6 MG/DL (ref 8.7–10.6)
CHLORIDE SERPL-SCNC: 92 MMOL/L (ref 98–112)
CHLORIDE SERPL-SCNC: 94 MMOL/L (ref 98–112)
CHOLEST SERPL-MCNC: 133 MG/DL (ref ?–200)
CO2 SERPL-SCNC: 12 MMOL/L (ref 21–32)
CO2 SERPL-SCNC: 21 MMOL/L (ref 21–32)
COHGB MFR BLD: 0.3 % SAT (ref 0–3)
COHGB MFR BLD: 0.6 % SAT (ref 0–3)
COHGB MFR BLD: 2.2 % SAT (ref 0–3)
CREAT BLD-MCNC: 2.05 MG/DL (ref 0.7–1.3)
CREAT BLD-MCNC: 2.09 MG/DL (ref 0.7–1.3)
EGFRCR SERPLBLD CKD-EPI 2021: 39 ML/MIN/1.73M2 (ref 60–?)
EGFRCR SERPLBLD CKD-EPI 2021: 40 ML/MIN/1.73M2 (ref 60–?)
EOSINOPHIL # BLD AUTO: 0.03 X10(3) UL (ref 0–0.7)
EOSINOPHIL NFR BLD AUTO: 0.2 %
ERYTHROCYTE [DISTWIDTH] IN BLOOD BY AUTOMATED COUNT: 18.4 %
EST. AVERAGE GLUCOSE BLD GHB EST-MCNC: 137 MG/DL (ref 68–126)
FIO2: 100 %
FIO2: 100 %
FIO2: 40 %
FIO2: 40 %
GLOBULIN PLAS-MCNC: 4.7 G/DL (ref 2–3.5)
GLUCOSE BLD-MCNC: 143 MG/DL (ref 70–99)
GLUCOSE BLD-MCNC: 171 MG/DL (ref 70–99)
GLUCOSE BLD-MCNC: 191 MG/DL (ref 70–99)
GLUCOSE BLD-MCNC: 196 MG/DL (ref 70–99)
GLUCOSE BLD-MCNC: 28 MG/DL (ref 70–99)
GLUCOSE BLD-MCNC: 78 MG/DL (ref 70–99)
HBA1C MFR BLD: 6.4 % (ref ?–5.7)
HCO3 BLDA-SCNC: 11.2 MEQ/L (ref 21–27)
HCO3 BLDA-SCNC: 16.2 MEQ/L (ref 21–27)
HCO3 BLDV-SCNC: 16.2 MEQ/L (ref 22–26)
HCO3 BLDV-SCNC: 8.8 MEQ/L (ref 22–26)
HCT VFR BLD AUTO: 34.8 % (ref 39–53)
HDLC SERPL-MCNC: 18 MG/DL (ref 40–59)
HGB BLD-MCNC: 10.6 G/DL (ref 13–17.5)
HGB BLD-MCNC: 11 G/DL (ref 13–17.5)
HGB BLD-MCNC: 8.8 G/DL (ref 13–17.5)
HGB BLD-MCNC: 9.3 G/DL (ref 13–17.5)
IMM GRANULOCYTES # BLD AUTO: 0.27 X10(3) UL (ref 0–1)
IMM GRANULOCYTES NFR BLD: 1.8 %
INR BLD: 2.03 (ref 0.8–1.2)
LACTATE BLD-SCNC: 10.1 MMOL/L (ref 0.5–2)
LACTATE BLD-SCNC: 10.6 MMOL/L (ref 0.5–2)
LACTATE BLD-SCNC: 15.5 MMOL/L (ref 0.5–2)
LACTATE BLD-SCNC: >16.5 MMOL/L (ref 0.5–2)
LACTATE SERPL-SCNC: 10.8 MMOL/L (ref 0.5–2)
LACTATE SERPL-SCNC: 17 MMOL/L (ref 0.5–2)
LDLC SERPL CALC-MCNC: 89 MG/DL (ref ?–100)
LYMPHOCYTES # BLD AUTO: 1.27 X10(3) UL (ref 1–4)
LYMPHOCYTES NFR BLD AUTO: 8.4 %
MCH RBC QN AUTO: 28.6 PG (ref 26–34)
MCHC RBC AUTO-ENTMCNC: 30.5 G/DL (ref 31–37)
MCV RBC AUTO: 94.1 FL (ref 80–100)
METHGB MFR BLD: 0 % SAT (ref 0.4–1.5)
METHGB MFR BLD: 0.2 % SAT (ref 0.4–1.5)
METHGB MFR BLD: 0.6 % SAT (ref 0.4–1.5)
MONOCYTES # BLD AUTO: 0.78 X10(3) UL (ref 0.1–1)
MONOCYTES NFR BLD AUTO: 5.1 %
NEUTROPHILS # BLD AUTO: 12.7 X10 (3) UL (ref 1.5–7.7)
NEUTROPHILS # BLD AUTO: 12.7 X10(3) UL (ref 1.5–7.7)
NEUTROPHILS NFR BLD AUTO: 83.8 %
NONHDLC SERPL-MCNC: 115 MG/DL (ref ?–130)
NT-PROBNP SERPL-MCNC: ABNORMAL PG/ML (ref ?–125)
OSMOLALITY SERPL CALC.SUM OF ELEC: 279 MOSM/KG (ref 275–295)
OSMOLALITY SERPL CALC.SUM OF ELEC: 287 MOSM/KG (ref 275–295)
OXYHGB MFR BLDA: 97.8 % (ref 92–100)
OXYHGB MFR BLDA: 97.9 % (ref 92–100)
OXYHGB MFR BLDV: 26.6 % (ref 72–78)
OXYHGB MFR BLDV: 27.5 % (ref 72–78)
OXYHGB MFR BLDV: 90.4 % (ref 72–78)
PCO2 BLDA: 32 MM HG (ref 35–45)
PCO2 BLDA: 32 MM HG (ref 35–45)
PCO2 BLDV: 34 MM HG (ref 38–50)
PCO2 BLDV: 35 MM HG (ref 38–50)
PEEP: 5 CM H2O
PH BLDA: 7.12 (ref 7.35–7.45)
PH BLDA: 7.27 (ref 7.35–7.45)
PH BLDV: 7.08 (ref 7.33–7.43)
PH BLDV: 7.25 (ref 7.33–7.43)
PLATELET # BLD AUTO: 622 10(3)UL (ref 150–450)
PO2 BLDA: 151 MM HG (ref 80–100)
PO2 BLDA: 197 MM HG (ref 80–100)
PO2 BLDV: 32 MM HG (ref 30–50)
PO2 BLDV: 69 MM HG (ref 30–50)
POTASSIUM BLD-SCNC: 4.8 MMOL/L (ref 3.6–5.1)
POTASSIUM BLD-SCNC: 5 MMOL/L (ref 3.6–5.1)
POTASSIUM BLD-SCNC: 5.4 MMOL/L (ref 3.6–5.1)
POTASSIUM BLD-SCNC: 6 MMOL/L (ref 3.6–5.1)
POTASSIUM SERPL-SCNC: 4.7 MMOL/L (ref 3.5–5.1)
POTASSIUM SERPL-SCNC: 5.5 MMOL/L (ref 3.5–5.1)
PROT SERPL-MCNC: 8.7 G/DL (ref 5.7–8.2)
PROTHROMBIN TIME: 23.2 SECONDS (ref 11.6–14.8)
RBC # BLD AUTO: 3.7 X10(6)UL (ref 4.3–5.7)
RH BLOOD TYPE: POSITIVE
SODIUM BLD-SCNC: 128 MMOL/L (ref 135–145)
SODIUM BLD-SCNC: 130 MMOL/L (ref 135–145)
SODIUM BLD-SCNC: 130 MMOL/L (ref 135–145)
SODIUM SERPL-SCNC: 131 MMOL/L (ref 136–145)
SODIUM SERPL-SCNC: 132 MMOL/L (ref 136–145)
TIDAL VOLUME: 550 ML
TRIGL SERPL-MCNC: 146 MG/DL (ref 30–149)
TROPONIN I SERPL HS-MCNC: 147 NG/L (ref ?–53)
VENOUS LITERS PER MINUTE: 15 L/MIN
VENT RATE: 18 /MIN
VLDLC SERPL CALC-MCNC: 24 MG/DL (ref 0–30)
WBC # BLD AUTO: 15.2 X10(3) UL (ref 4–11)

## 2025-07-25 PROCEDURE — 71045 X-RAY EXAM CHEST 1 VIEW: CPT | Performed by: EMERGENCY MEDICINE

## 2025-07-25 PROCEDURE — 93306 TTE W/DOPPLER COMPLETE: CPT | Performed by: EMERGENCY MEDICINE

## 2025-07-25 PROCEDURE — 3E033XZ INTRODUCTION OF VASOPRESSOR INTO PERIPHERAL VEIN, PERCUTANEOUS APPROACH: ICD-10-PCS

## 2025-07-25 PROCEDURE — 36556 INSERT NON-TUNNEL CV CATH: CPT

## 2025-07-25 PROCEDURE — 36620 INSERTION CATHETER ARTERY: CPT | Performed by: INTERNAL MEDICINE

## 2025-07-25 PROCEDURE — 5A1945Z RESPIRATORY VENTILATION, 24-96 CONSECUTIVE HOURS: ICD-10-PCS | Performed by: EMERGENCY MEDICINE

## 2025-07-25 PROCEDURE — 99223 1ST HOSP IP/OBS HIGH 75: CPT | Performed by: STUDENT IN AN ORGANIZED HEALTH CARE EDUCATION/TRAINING PROGRAM

## 2025-07-25 PROCEDURE — 74177 CT ABD & PELVIS W/CONTRAST: CPT | Performed by: EMERGENCY MEDICINE

## 2025-07-25 PROCEDURE — 99291 CRITICAL CARE FIRST HOUR: CPT | Performed by: INTERNAL MEDICINE

## 2025-07-25 PROCEDURE — 71275 CT ANGIOGRAPHY CHEST: CPT | Performed by: EMERGENCY MEDICINE

## 2025-07-25 PROCEDURE — 99291 CRITICAL CARE FIRST HOUR: CPT | Performed by: HOSPITALIST

## 2025-07-25 PROCEDURE — 0BH17EZ INSERTION OF ENDOTRACHEAL AIRWAY INTO TRACHEA, VIA NATURAL OR ARTIFICIAL OPENING: ICD-10-PCS | Performed by: EMERGENCY MEDICINE

## 2025-07-25 RX ORDER — NICOTINE POLACRILEX 4 MG
15 LOZENGE BUCCAL
Status: DISCONTINUED | OUTPATIENT
Start: 2025-07-25 | End: 2025-07-28

## 2025-07-25 RX ORDER — ONDANSETRON 2 MG/ML
4 INJECTION INTRAMUSCULAR; INTRAVENOUS EVERY 6 HOURS PRN
Status: DISCONTINUED | OUTPATIENT
Start: 2025-07-25 | End: 2025-07-25

## 2025-07-25 RX ORDER — IOPAMIDOL 755 MG/ML
100 INJECTION, SOLUTION INTRAVASCULAR
Status: COMPLETED | OUTPATIENT
Start: 2025-07-25 | End: 2025-07-25

## 2025-07-25 RX ORDER — DEXTROSE MONOHYDRATE 25 G/50ML
50 INJECTION, SOLUTION INTRAVENOUS
Status: DISCONTINUED | OUTPATIENT
Start: 2025-07-25 | End: 2025-07-28

## 2025-07-25 RX ORDER — FUROSEMIDE 10 MG/ML
100 INJECTION INTRAMUSCULAR; INTRAVENOUS ONCE
Status: COMPLETED | OUTPATIENT
Start: 2025-07-25 | End: 2025-07-25

## 2025-07-25 RX ORDER — DEXTROSE MONOHYDRATE 25 G/50ML
50 INJECTION, SOLUTION INTRAVENOUS ONCE
Status: COMPLETED | OUTPATIENT
Start: 2025-07-25 | End: 2025-07-25

## 2025-07-25 RX ORDER — NICOTINE POLACRILEX 4 MG
30 LOZENGE BUCCAL
Status: DISCONTINUED | OUTPATIENT
Start: 2025-07-25 | End: 2025-08-13

## 2025-07-25 RX ORDER — BUMETANIDE 2 MG/1
2 TABLET ORAL
COMMUNITY
End: 2025-08-13

## 2025-07-25 RX ORDER — POLYETHYLENE GLYCOL 3350 17 G/17G
17 POWDER, FOR SOLUTION ORAL DAILY PRN
Status: DISCONTINUED | OUTPATIENT
Start: 2025-07-25 | End: 2025-08-13

## 2025-07-25 RX ORDER — ACETAMINOPHEN 500 MG
500 TABLET ORAL EVERY 4 HOURS PRN
Status: DISCONTINUED | OUTPATIENT
Start: 2025-07-25 | End: 2025-07-30

## 2025-07-25 RX ORDER — ENOXAPARIN SODIUM 100 MG/ML
40 INJECTION SUBCUTANEOUS DAILY
Status: CANCELLED | OUTPATIENT
Start: 2025-07-25

## 2025-07-25 RX ORDER — DEXTROSE MONOHYDRATE 25 G/50ML
50 INJECTION, SOLUTION INTRAVENOUS
Status: DISCONTINUED | OUTPATIENT
Start: 2025-07-25 | End: 2025-08-13

## 2025-07-25 RX ORDER — PROCHLORPERAZINE EDISYLATE 5 MG/ML
5 INJECTION INTRAMUSCULAR; INTRAVENOUS EVERY 8 HOURS PRN
Status: DISCONTINUED | OUTPATIENT
Start: 2025-07-25 | End: 2025-08-05

## 2025-07-25 RX ORDER — ETOMIDATE 2 MG/ML
INJECTION INTRAVENOUS
Status: COMPLETED | OUTPATIENT
Start: 2025-07-25 | End: 2025-07-25

## 2025-07-25 RX ORDER — INDOMETHACIN 25 MG/1
50 CAPSULE ORAL ONCE
Status: COMPLETED | OUTPATIENT
Start: 2025-07-25 | End: 2025-07-25

## 2025-07-25 RX ORDER — SODIUM PHOSPHATE, DIBASIC AND SODIUM PHOSPHATE, MONOBASIC 7; 19 G/230ML; G/230ML
1 ENEMA RECTAL ONCE AS NEEDED
Status: DISCONTINUED | OUTPATIENT
Start: 2025-07-25 | End: 2025-08-13

## 2025-07-25 RX ORDER — DEXTROSE MONOHYDRATE 25 G/50ML
50 INJECTION, SOLUTION INTRAVENOUS AS NEEDED
Status: DISCONTINUED | OUTPATIENT
Start: 2025-07-25 | End: 2025-07-25

## 2025-07-25 RX ORDER — SENNOSIDES 8.6 MG
17.2 TABLET ORAL NIGHTLY PRN
Status: DISCONTINUED | OUTPATIENT
Start: 2025-07-25 | End: 2025-08-13

## 2025-07-25 RX ORDER — HEPARIN SODIUM 5000 [USP'U]/ML
5000 INJECTION, SOLUTION INTRAVENOUS; SUBCUTANEOUS EVERY 12 HOURS SCHEDULED
Status: DISCONTINUED | OUTPATIENT
Start: 2025-07-25 | End: 2025-07-28

## 2025-07-25 RX ORDER — DEXTROSE MONOHYDRATE 25 G/50ML
INJECTION, SOLUTION INTRAVENOUS
Status: DISPENSED
Start: 2025-07-25 | End: 2025-07-26

## 2025-07-25 RX ORDER — SODIUM CHLORIDE 9 MG/ML
INJECTION, SOLUTION INTRAVENOUS CONTINUOUS
Status: ACTIVE | OUTPATIENT
Start: 2025-07-25 | End: 2025-07-25

## 2025-07-25 RX ORDER — BISACODYL 10 MG
10 SUPPOSITORY, RECTAL RECTAL
Status: DISCONTINUED | OUTPATIENT
Start: 2025-07-25 | End: 2025-08-13

## 2025-07-25 RX ORDER — NICOTINE POLACRILEX 4 MG
30 LOZENGE BUCCAL
Status: DISCONTINUED | OUTPATIENT
Start: 2025-07-25 | End: 2025-07-28

## 2025-07-26 ENCOUNTER — APPOINTMENT (OUTPATIENT)
Dept: CT IMAGING | Facility: HOSPITAL | Age: 45
End: 2025-07-26
Attending: PHYSICIAN ASSISTANT

## 2025-07-26 PROBLEM — N17.9 AKI (ACUTE KIDNEY INJURY): Status: ACTIVE | Noted: 2025-07-25

## 2025-07-26 PROBLEM — N17.0 ATN (ACUTE TUBULAR NECROSIS): Status: ACTIVE | Noted: 2025-07-26

## 2025-07-26 LAB
ALBUMIN SERPL-MCNC: 3.3 G/DL (ref 3.2–4.8)
ALBUMIN/GLOB SERPL: 0.9 (ref 1–2)
ALP LIVER SERPL-CCNC: 113 U/L (ref 45–117)
ALT SERPL-CCNC: 131 U/L (ref 10–49)
AMMONIA PLAS-MCNC: 39 UMOL/L (ref 11–32)
ANION GAP SERPL CALC-SCNC: 12 MMOL/L (ref 0–18)
AST SERPL-CCNC: 355 U/L (ref ?–34)
BASE EXCESS BLDA CALC-SCNC: 2.3 MMOL/L (ref ?–2)
BASE EXCESS BLDV CALC-SCNC: 2
BASOPHILS # BLD AUTO: 0.04 X10(3) UL (ref 0–0.2)
BASOPHILS NFR BLD AUTO: 0.3 %
BILIRUB SERPL-MCNC: 1.5 MG/DL (ref 0.3–1.2)
BODY TEMPERATURE: 98.6 F
BUN BLD-MCNC: 44 MG/DL (ref 9–23)
CA-I BLD-SCNC: 1.1 MMOL/L (ref 0.95–1.32)
CALCIUM BLD-MCNC: 8.3 MG/DL (ref 8.7–10.6)
CHLORIDE SERPL-SCNC: 96 MMOL/L (ref 98–112)
CO2 SERPL-SCNC: 26 MMOL/L (ref 21–32)
COHGB MFR BLD: 2.3 % SAT (ref 0–3)
COLOR FLD: YELLOW
CREAT BLD-MCNC: 2.05 MG/DL (ref 0.7–1.3)
EGFRCR SERPLBLD CKD-EPI 2021: 40 ML/MIN/1.73M2 (ref 60–?)
EOSINOPHIL # BLD AUTO: 0.01 X10(3) UL (ref 0–0.7)
EOSINOPHIL NFR BLD AUTO: 0.1 %
ERYTHROCYTE [DISTWIDTH] IN BLOOD BY AUTOMATED COUNT: 17.9 %
FIO2: 40 %
FIO2: 40 %
GLOBULIN PLAS-MCNC: 3.7 G/DL (ref 2–3.5)
GLUCOSE BLD-MCNC: 109 MG/DL (ref 70–99)
GLUCOSE BLD-MCNC: 121 MG/DL (ref 70–99)
GLUCOSE BLD-MCNC: 122 MG/DL (ref 70–99)
GLUCOSE BLD-MCNC: 133 MG/DL (ref 70–99)
GLUCOSE BLD-MCNC: 137 MG/DL (ref 70–99)
GLUCOSE BLD-MCNC: 213 MG/DL (ref 70–99)
HCO3 BLDA-SCNC: 26.8 MEQ/L (ref 21–27)
HCO3 BLDV-SCNC: 26.3 MEQ/L (ref 22–26)
HCT VFR BLD AUTO: 24.7 % (ref 39–53)
HGB BLD-MCNC: 8.3 G/DL (ref 13–17.5)
HGB BLD-MCNC: 8.8 G/DL (ref 13–17.5)
IMM GRANULOCYTES # BLD AUTO: 0.17 X10(3) UL (ref 0–1)
IMM GRANULOCYTES NFR BLD: 1.2 %
INR BLD: 2.16 (ref 0.8–1.2)
LACTATE BLD-SCNC: 1.9 MMOL/L (ref 0.5–2)
LACTATE SERPL-SCNC: 3.3 MMOL/L (ref 0.5–2)
LYMPHOCYTES # BLD AUTO: 0.93 X10(3) UL (ref 1–4)
LYMPHOCYTES NFR BLD AUTO: 6.4 %
MAGNESIUM SERPL-MCNC: 2.3 MG/DL (ref 1.6–2.6)
MCH RBC QN AUTO: 28.8 PG (ref 26–34)
MCHC RBC AUTO-ENTMCNC: 33.6 G/DL (ref 31–37)
MCV RBC AUTO: 85.8 FL (ref 80–100)
METHGB MFR BLD: 0 % SAT (ref 0.4–1.5)
MONOCYTES # BLD AUTO: 0.74 X10(3) UL (ref 0.1–1)
MONOCYTES NFR BLD AUTO: 5.1 %
MRSA DNA SPEC QL NAA+PROBE: NEGATIVE
NEUTROPHILS # BLD AUTO: 12.58 X10 (3) UL (ref 1.5–7.7)
NEUTROPHILS # BLD AUTO: 12.58 X10(3) UL (ref 1.5–7.7)
NEUTROPHILS NFR BLD AUTO: 86.9 %
OSMOLALITY SERPL CALC.SUM OF ELEC: 291 MOSM/KG (ref 275–295)
OXYHGB MFR BLDA: 97.7 % (ref 92–100)
OXYHGB MFR BLDV: 89.3 % (ref 72–78)
P/F RATIO: 373 MMHG
PCO2 BLDA: 38 MM HG (ref 35–45)
PCO2 BLDV: 40 MM HG (ref 38–50)
PEEP: 5 CM H2O
PEEP: 5 CM H2O
PH BLDA: 7.45 (ref 7.35–7.45)
PH BLDV: 7.43 (ref 7.33–7.43)
PHOSPHATE SERPL-MCNC: 6.4 MG/DL (ref 2.4–5.1)
PLATELET # BLD AUTO: 477 10(3)UL (ref 150–450)
PO2 BLDA: 149 MM HG (ref 80–100)
PO2 BLDV: 61 MM HG (ref 30–50)
POTASSIUM BLD-SCNC: 4.9 MMOL/L (ref 3.6–5.1)
POTASSIUM SERPL-SCNC: 4.6 MMOL/L (ref 3.5–5.1)
PROT SERPL-MCNC: 7 G/DL (ref 5.7–8.2)
PROTHROMBIN TIME: 24.2 SECONDS (ref 11.6–14.8)
RBC # BLD AUTO: 2.88 X10(6)UL (ref 4.3–5.7)
RBC PERITONEAL FLUID: NORMAL /MM3
SODIUM BLD-SCNC: 131 MMOL/L (ref 135–145)
SODIUM SERPL-SCNC: 134 MMOL/L (ref 136–145)
TIDAL VOLUME: 400 ML
TIDAL VOLUME: 550 ML
TROPONIN I SERPL HS-MCNC: 153 NG/L (ref ?–53)
TROPONIN I SERPL HS-MCNC: 175 NG/L (ref ?–53)
VENT RATE: 14 /MIN
VENT RATE: 18 /MIN
WBC # BLD AUTO: 14.5 X10(3) UL (ref 4–11)
WBC # PRT: NORMAL /MM3

## 2025-07-26 PROCEDURE — 0D9W30Z DRAINAGE OF PERITONEUM WITH DRAINAGE DEVICE, PERCUTANEOUS APPROACH: ICD-10-PCS | Performed by: RADIOLOGY

## 2025-07-26 PROCEDURE — 99223 1ST HOSP IP/OBS HIGH 75: CPT | Performed by: INTERNAL MEDICINE

## 2025-07-26 PROCEDURE — 49406 IMAGE CATH FLUID PERI/RETRO: CPT | Performed by: PHYSICIAN ASSISTANT

## 2025-07-26 PROCEDURE — 30233K1 TRANSFUSION OF NONAUTOLOGOUS FROZEN PLASMA INTO PERIPHERAL VEIN, PERCUTANEOUS APPROACH: ICD-10-PCS | Performed by: EMERGENCY MEDICINE

## 2025-07-26 PROCEDURE — 99232 SBSQ HOSP IP/OBS MODERATE 35: CPT | Performed by: STUDENT IN AN ORGANIZED HEALTH CARE EDUCATION/TRAINING PROGRAM

## 2025-07-26 PROCEDURE — 99291 CRITICAL CARE FIRST HOUR: CPT | Performed by: EMERGENCY MEDICINE

## 2025-07-26 PROCEDURE — 99233 SBSQ HOSP IP/OBS HIGH 50: CPT | Performed by: HOSPITALIST

## 2025-07-26 RX ORDER — MINERAL OIL AND PETROLATUM 150; 830 MG/G; MG/G
1 OINTMENT OPHTHALMIC NIGHTLY
Status: DISCONTINUED | OUTPATIENT
Start: 2025-07-26 | End: 2025-07-27

## 2025-07-26 RX ORDER — SODIUM CHLORIDE 9 MG/ML
INJECTION, SOLUTION INTRAVENOUS ONCE
Status: COMPLETED | OUTPATIENT
Start: 2025-07-26 | End: 2025-07-26

## 2025-07-26 RX ORDER — SODIUM BICARBONATE 650 MG/1
650 TABLET ORAL AS NEEDED
Status: DISCONTINUED | OUTPATIENT
Start: 2025-07-26 | End: 2025-08-08 | Stop reason: ALTCHOICE

## 2025-07-26 RX ORDER — FUROSEMIDE 10 MG/ML
80 INJECTION INTRAMUSCULAR; INTRAVENOUS ONCE
Status: COMPLETED | OUTPATIENT
Start: 2025-07-26 | End: 2025-07-26

## 2025-07-26 RX ORDER — SENNOSIDES 8.8 MG/5ML
10 LIQUID ORAL 2 TIMES DAILY
Status: DISCONTINUED | OUTPATIENT
Start: 2025-07-26 | End: 2025-07-27

## 2025-07-26 RX ORDER — CHLORHEXIDINE GLUCONATE ORAL RINSE 1.2 MG/ML
15 SOLUTION DENTAL
Status: DISCONTINUED | OUTPATIENT
Start: 2025-07-26 | End: 2025-07-27

## 2025-07-26 RX ORDER — DOCUSATE SODIUM 50 MG/5ML
100 LIQUID ORAL 2 TIMES DAILY
Status: DISCONTINUED | OUTPATIENT
Start: 2025-07-26 | End: 2025-07-27

## 2025-07-27 LAB
ALBUMIN SERPL-MCNC: 3.2 G/DL (ref 3.2–4.8)
ALBUMIN/GLOB SERPL: 0.8 (ref 1–2)
ALP LIVER SERPL-CCNC: 112 U/L (ref 45–117)
ALT SERPL-CCNC: 259 U/L (ref 10–49)
ANION GAP SERPL CALC-SCNC: 11 MMOL/L (ref 0–18)
ANION GAP SERPL CALC-SCNC: 11 MMOL/L (ref 0–18)
ANION GAP SERPL CALC-SCNC: 13 MMOL/L (ref 0–18)
AST SERPL-CCNC: 581 U/L (ref ?–34)
BASE EXCESS BLDA CALC-SCNC: 6.3 MMOL/L (ref ?–2)
BASE EXCESS BLDA CALC-SCNC: 7.8 MMOL/L (ref ?–2)
BASOPHILS # BLD AUTO: 0.05 X10(3) UL (ref 0–0.2)
BASOPHILS NFR BLD AUTO: 0.5 %
BILIRUB SERPL-MCNC: 1.3 MG/DL (ref 0.3–1.2)
BLOOD TYPE BARCODE: 5100
BODY TEMPERATURE: 98.6 F
BODY TEMPERATURE: 98.6 F
BUN BLD-MCNC: 31 MG/DL (ref 9–23)
BUN BLD-MCNC: 34 MG/DL (ref 9–23)
BUN BLD-MCNC: 39 MG/DL (ref 9–23)
CA-I BLD-SCNC: 1.12 MMOL/L (ref 0.95–1.32)
CA-I BLD-SCNC: 1.13 MMOL/L (ref 0.95–1.32)
CALCIUM BLD-MCNC: 8.3 MG/DL (ref 8.7–10.6)
CALCIUM BLD-MCNC: 8.4 MG/DL (ref 8.7–10.6)
CALCIUM BLD-MCNC: 8.4 MG/DL (ref 8.7–10.6)
CHLORIDE SERPL-SCNC: 95 MMOL/L (ref 98–112)
CHLORIDE SERPL-SCNC: 95 MMOL/L (ref 98–112)
CHLORIDE SERPL-SCNC: 96 MMOL/L (ref 98–112)
CO2 SERPL-SCNC: 26 MMOL/L (ref 21–32)
CO2 SERPL-SCNC: 27 MMOL/L (ref 21–32)
CO2 SERPL-SCNC: 30 MMOL/L (ref 21–32)
COHGB MFR BLD: 1.6 % SAT (ref 0–3)
COHGB MFR BLD: 1.7 % SAT (ref 0–3)
CREAT BLD-MCNC: 1.26 MG/DL (ref 0.7–1.3)
CREAT BLD-MCNC: 1.39 MG/DL (ref 0.7–1.3)
CREAT BLD-MCNC: 1.58 MG/DL (ref 0.7–1.3)
EGFRCR SERPLBLD CKD-EPI 2021: 55 ML/MIN/1.73M2 (ref 60–?)
EGFRCR SERPLBLD CKD-EPI 2021: 64 ML/MIN/1.73M2 (ref 60–?)
EGFRCR SERPLBLD CKD-EPI 2021: 72 ML/MIN/1.73M2 (ref 60–?)
EOSINOPHIL # BLD AUTO: 0.08 X10(3) UL (ref 0–0.7)
EOSINOPHIL NFR BLD AUTO: 0.8 %
ERYTHROCYTE [DISTWIDTH] IN BLOOD BY AUTOMATED COUNT: 18.6 %
FIO2: 100 %
FIO2: 30 %
GLOBULIN PLAS-MCNC: 3.8 G/DL (ref 2–3.5)
GLUCOSE BLD-MCNC: 111 MG/DL (ref 70–99)
GLUCOSE BLD-MCNC: 117 MG/DL (ref 70–99)
GLUCOSE BLD-MCNC: 119 MG/DL (ref 70–99)
GLUCOSE BLD-MCNC: 145 MG/DL (ref 70–99)
GLUCOSE BLD-MCNC: 154 MG/DL (ref 70–99)
GLUCOSE BLD-MCNC: 163 MG/DL (ref 70–99)
GLUCOSE BLD-MCNC: 163 MG/DL (ref 70–99)
HCO3 BLDA-SCNC: 29.8 MEQ/L (ref 21–27)
HCO3 BLDA-SCNC: 31 MEQ/L (ref 21–27)
HCT VFR BLD AUTO: 25.8 % (ref 39–53)
HGB BLD-MCNC: 8.5 G/DL (ref 13–17.5)
HGB BLD-MCNC: 9.2 G/DL (ref 13–17.5)
HGB BLD-MCNC: 9.5 G/DL (ref 13–17.5)
IMM GRANULOCYTES # BLD AUTO: 0.07 X10(3) UL (ref 0–1)
IMM GRANULOCYTES NFR BLD: 0.7 %
INR BLD: 1.72 (ref 0.8–1.2)
L/M: 40 L/MIN
LACTATE BLD-SCNC: 1.2 MMOL/L (ref 0.5–2)
LACTATE BLD-SCNC: 1.4 MMOL/L (ref 0.5–2)
LYMPHOCYTES # BLD AUTO: 0.68 X10(3) UL (ref 1–4)
LYMPHOCYTES NFR BLD AUTO: 7.2 %
MAGNESIUM SERPL-MCNC: 2.3 MG/DL (ref 1.6–2.6)
MCH RBC QN AUTO: 29 PG (ref 26–34)
MCHC RBC AUTO-ENTMCNC: 32.9 G/DL (ref 31–37)
MCV RBC AUTO: 88.1 FL (ref 80–100)
METHGB MFR BLD: 0.4 % SAT (ref 0.4–1.5)
METHGB MFR BLD: 0.5 % SAT (ref 0.4–1.5)
MONOCYTES # BLD AUTO: 0.41 X10(3) UL (ref 0.1–1)
MONOCYTES NFR BLD AUTO: 4.3 %
NEUTROPHILS # BLD AUTO: 8.19 X10 (3) UL (ref 1.5–7.7)
NEUTROPHILS # BLD AUTO: 8.19 X10(3) UL (ref 1.5–7.7)
NEUTROPHILS NFR BLD AUTO: 86.5 %
OSMOLALITY SERPL CALC.SUM OF ELEC: 286 MOSM/KG (ref 275–295)
OSMOLALITY SERPL CALC.SUM OF ELEC: 289 MOSM/KG (ref 275–295)
OSMOLALITY SERPL CALC.SUM OF ELEC: 294 MOSM/KG (ref 275–295)
OXYHGB MFR BLDA: 95.4 % (ref 92–100)
OXYHGB MFR BLDA: 97.8 % (ref 92–100)
PCO2 BLDA: 36 MM HG (ref 35–45)
PCO2 BLDA: 42 MM HG (ref 35–45)
PEEP: 5 CM H2O
PH BLDA: 7.47 (ref 7.35–7.45)
PH BLDA: 7.54 (ref 7.35–7.45)
PHOSPHATE SERPL-MCNC: 3.4 MG/DL (ref 2.4–5.1)
PLATELET # BLD AUTO: 385 10(3)UL (ref 150–450)
PO2 BLDA: 226 MM HG (ref 80–100)
PO2 BLDA: 78 MM HG (ref 80–100)
POTASSIUM BLD-SCNC: 3.7 MMOL/L (ref 3.6–5.1)
POTASSIUM BLD-SCNC: 4.3 MMOL/L (ref 3.6–5.1)
POTASSIUM SERPL-SCNC: 3.4 MMOL/L (ref 3.5–5.1)
POTASSIUM SERPL-SCNC: 3.5 MMOL/L (ref 3.5–5.1)
POTASSIUM SERPL-SCNC: 4.1 MMOL/L (ref 3.5–5.1)
PRESSURE SUPPORT: 5 CM H2O
PROT SERPL-MCNC: 7 G/DL (ref 5.7–8.2)
PROTHROMBIN TIME: 20.3 SECONDS (ref 11.6–14.8)
Q-T INTERVAL: 386 MS
QRS DURATION: 144 MS
QTC CALCULATION (BEZET): 584 MS
R AXIS: -26 DEGREES
RBC # BLD AUTO: 2.93 X10(6)UL (ref 4.3–5.7)
SODIUM BLD-SCNC: 132 MMOL/L (ref 135–145)
SODIUM BLD-SCNC: 133 MMOL/L (ref 135–145)
SODIUM SERPL-SCNC: 133 MMOL/L (ref 136–145)
SODIUM SERPL-SCNC: 134 MMOL/L (ref 136–145)
SODIUM SERPL-SCNC: 137 MMOL/L (ref 136–145)
T AXIS: 78 DEGREES
UNIT VOLUME: 360 ML
VENTRICULAR RATE: 138 BPM
WBC # BLD AUTO: 9.5 X10(3) UL (ref 4–11)

## 2025-07-27 PROCEDURE — 99233 SBSQ HOSP IP/OBS HIGH 50: CPT | Performed by: HOSPITALIST

## 2025-07-27 PROCEDURE — 99291 CRITICAL CARE FIRST HOUR: CPT | Performed by: EMERGENCY MEDICINE

## 2025-07-27 PROCEDURE — 99291 CRITICAL CARE FIRST HOUR: CPT | Performed by: INTERNAL MEDICINE

## 2025-07-27 RX ORDER — SPIRONOLACTONE 25 MG/1
25 TABLET ORAL DAILY
Status: DISCONTINUED | OUTPATIENT
Start: 2025-07-27 | End: 2025-08-04

## 2025-07-27 RX ORDER — SENNOSIDES 8.6 MG
8.6 TABLET ORAL 2 TIMES DAILY
Status: DISCONTINUED | OUTPATIENT
Start: 2025-07-27 | End: 2025-08-13

## 2025-07-27 RX ORDER — DOCUSATE SODIUM 100 MG/1
100 CAPSULE, LIQUID FILLED ORAL 2 TIMES DAILY
Status: DISCONTINUED | OUTPATIENT
Start: 2025-07-27 | End: 2025-08-13

## 2025-07-27 RX ORDER — BUMETANIDE 0.25 MG/ML
2 INJECTION, SOLUTION INTRAMUSCULAR; INTRAVENOUS
Status: DISCONTINUED | OUTPATIENT
Start: 2025-07-27 | End: 2025-07-28

## 2025-07-27 RX ORDER — POTASSIUM CHLORIDE 1.5 G/1.58G
40 POWDER, FOR SOLUTION ORAL 2 TIMES DAILY
Status: COMPLETED | OUTPATIENT
Start: 2025-07-27 | End: 2025-07-27

## 2025-07-28 ENCOUNTER — APPOINTMENT (OUTPATIENT)
Dept: GENERAL RADIOLOGY | Facility: HOSPITAL | Age: 45
End: 2025-07-28

## 2025-07-28 PROBLEM — A41.9 SEPTIC SHOCK (HCC): Status: ACTIVE | Noted: 2025-07-25

## 2025-07-28 PROBLEM — R65.21 SEPTIC SHOCK (HCC): Status: ACTIVE | Noted: 2025-07-25

## 2025-07-28 LAB
ALBUMIN SERPL-MCNC: 3.4 G/DL (ref 3.2–4.8)
ALBUMIN/GLOB SERPL: 0.9 (ref 1–2)
ALP LIVER SERPL-CCNC: 135 U/L (ref 45–117)
ALT SERPL-CCNC: 215 U/L (ref 10–49)
ANION GAP SERPL CALC-SCNC: 11 MMOL/L (ref 0–18)
ANION GAP SERPL CALC-SCNC: 9 MMOL/L (ref 0–18)
ANION GAP SERPL CALC-SCNC: 9 MMOL/L (ref 0–18)
AST SERPL-CCNC: 331 U/L (ref ?–34)
BASE EXCESS BLDV CALC-SCNC: 5
BASE EXCESS BLDV CALC-SCNC: 7.5
BASE EXCESS BLDV CALC-SCNC: 7.7
BASOPHILS # BLD AUTO: 0.07 X10(3) UL (ref 0–0.2)
BASOPHILS NFR BLD AUTO: 0.7 %
BILIRUB SERPL-MCNC: 1.4 MG/DL (ref 0.3–1.2)
BUN BLD-MCNC: 26 MG/DL (ref 9–23)
BUN BLD-MCNC: 27 MG/DL (ref 9–23)
BUN BLD-MCNC: 27 MG/DL (ref 9–23)
CA-I BLD-SCNC: 1.06 MMOL/L (ref 0.95–1.32)
CALCIUM BLD-MCNC: 8.2 MG/DL (ref 8.7–10.6)
CALCIUM BLD-MCNC: 8.5 MG/DL (ref 8.7–10.6)
CALCIUM BLD-MCNC: 8.5 MG/DL (ref 8.7–10.6)
CHLORIDE SERPL-SCNC: 94 MMOL/L (ref 98–112)
CHLORIDE SERPL-SCNC: 95 MMOL/L (ref 98–112)
CHLORIDE SERPL-SCNC: 95 MMOL/L (ref 98–112)
CO2 SERPL-SCNC: 27 MMOL/L (ref 21–32)
CO2 SERPL-SCNC: 31 MMOL/L (ref 21–32)
CO2 SERPL-SCNC: 33 MMOL/L (ref 21–32)
CREAT BLD-MCNC: 1.09 MG/DL (ref 0.7–1.3)
CREAT BLD-MCNC: 1.15 MG/DL (ref 0.7–1.3)
CREAT BLD-MCNC: 1.15 MG/DL (ref 0.7–1.3)
EGFRCR SERPLBLD CKD-EPI 2021: 80 ML/MIN/1.73M2 (ref 60–?)
EGFRCR SERPLBLD CKD-EPI 2021: 80 ML/MIN/1.73M2 (ref 60–?)
EGFRCR SERPLBLD CKD-EPI 2021: 85 ML/MIN/1.73M2 (ref 60–?)
EOSINOPHIL # BLD AUTO: 0.02 X10(3) UL (ref 0–0.7)
EOSINOPHIL NFR BLD AUTO: 0.2 %
ERYTHROCYTE [DISTWIDTH] IN BLOOD BY AUTOMATED COUNT: 18.8 %
GLOBULIN PLAS-MCNC: 4 G/DL (ref 2–3.5)
GLUCOSE BLD-MCNC: 110 MG/DL (ref 70–99)
GLUCOSE BLD-MCNC: 112 MG/DL (ref 70–99)
GLUCOSE BLD-MCNC: 129 MG/DL (ref 70–99)
GLUCOSE BLD-MCNC: 131 MG/DL (ref 70–99)
GLUCOSE BLD-MCNC: 136 MG/DL (ref 70–99)
GLUCOSE BLD-MCNC: 140 MG/DL (ref 70–99)
GLUCOSE BLD-MCNC: 142 MG/DL (ref 70–99)
GLUCOSE BLD-MCNC: 178 MG/DL (ref 70–99)
HCO3 BLDV-SCNC: 27.5 MEQ/L (ref 22–26)
HCO3 BLDV-SCNC: 30 MEQ/L (ref 22–26)
HCO3 BLDV-SCNC: 30.2 MEQ/L (ref 22–26)
HCT VFR BLD AUTO: 27.1 % (ref 39–53)
HGB BLD-MCNC: 9 G/DL (ref 13–17.5)
IMM GRANULOCYTES # BLD AUTO: 0.07 X10(3) UL (ref 0–1)
IMM GRANULOCYTES NFR BLD: 0.7 %
INR BLD: 1.61 (ref 0.8–1.2)
LYMPHOCYTES # BLD AUTO: 1.22 X10(3) UL (ref 1–4)
LYMPHOCYTES NFR BLD AUTO: 11.4 %
MAGNESIUM SERPL-MCNC: 2.1 MG/DL (ref 1.6–2.6)
MCH RBC QN AUTO: 28.7 PG (ref 26–34)
MCHC RBC AUTO-ENTMCNC: 33.2 G/DL (ref 31–37)
MCV RBC AUTO: 86.3 FL (ref 80–100)
MONOCYTES # BLD AUTO: 0.6 X10(3) UL (ref 0.1–1)
MONOCYTES NFR BLD AUTO: 5.6 %
NEUTROPHILS # BLD AUTO: 8.69 X10 (3) UL (ref 1.5–7.7)
NEUTROPHILS # BLD AUTO: 8.69 X10(3) UL (ref 1.5–7.7)
NEUTROPHILS NFR BLD AUTO: 81.4 %
OSMOLALITY SERPL CALC.SUM OF ELEC: 283 MOSM/KG (ref 275–295)
OSMOLALITY SERPL CALC.SUM OF ELEC: 286 MOSM/KG (ref 275–295)
OSMOLALITY SERPL CALC.SUM OF ELEC: 289 MOSM/KG (ref 275–295)
OXYHGB MFR BLDV: 47.3 % (ref 72–78)
OXYHGB MFR BLDV: 67.4 % (ref 72–78)
OXYHGB MFR BLDV: 69.8 % (ref 72–78)
PCO2 BLDV: 44 MM HG (ref 38–50)
PCO2 BLDV: 46 MM HG (ref 38–50)
PCO2 BLDV: 47 MM HG (ref 38–50)
PH BLDV: 7.44 (ref 7.33–7.43)
PH BLDV: 7.45 (ref 7.33–7.43)
PH BLDV: 7.46 (ref 7.33–7.43)
PHOSPHATE SERPL-MCNC: 2.3 MG/DL (ref 2.4–5.1)
PLATELET # BLD AUTO: 355 10(3)UL (ref 150–450)
PO2 BLDV: 31 MM HG (ref 30–50)
PO2 BLDV: 41 MM HG (ref 30–50)
PO2 BLDV: 43 MM HG (ref 30–50)
POTASSIUM BLD-SCNC: 4.5 MMOL/L (ref 3.6–5.1)
POTASSIUM SERPL-SCNC: 3.5 MMOL/L (ref 3.5–5.1)
POTASSIUM SERPL-SCNC: 3.8 MMOL/L (ref 3.5–5.1)
POTASSIUM SERPL-SCNC: 4 MMOL/L (ref 3.5–5.1)
PROT SERPL-MCNC: 7.4 G/DL (ref 5.7–8.2)
PROTHROMBIN TIME: 19.3 SECONDS (ref 11.6–14.8)
RBC # BLD AUTO: 3.14 X10(6)UL (ref 4.3–5.7)
SODIUM BLD-SCNC: 131 MMOL/L (ref 135–145)
SODIUM SERPL-SCNC: 133 MMOL/L (ref 136–145)
SODIUM SERPL-SCNC: 135 MMOL/L (ref 136–145)
SODIUM SERPL-SCNC: 136 MMOL/L (ref 136–145)
VENOUS LITERS PER MINUTE: 8 L/MIN
WBC # BLD AUTO: 10.7 X10(3) UL (ref 4–11)

## 2025-07-28 PROCEDURE — 99291 CRITICAL CARE FIRST HOUR: CPT | Performed by: EMERGENCY MEDICINE

## 2025-07-28 PROCEDURE — 71045 X-RAY EXAM CHEST 1 VIEW: CPT

## 2025-07-28 PROCEDURE — 99232 SBSQ HOSP IP/OBS MODERATE 35: CPT | Performed by: HOSPITALIST

## 2025-07-28 PROCEDURE — 99233 SBSQ HOSP IP/OBS HIGH 50: CPT | Performed by: INTERNAL MEDICINE

## 2025-07-28 RX ORDER — METOPROLOL SUCCINATE 25 MG/1
25 TABLET, EXTENDED RELEASE ORAL
Status: DISCONTINUED | OUTPATIENT
Start: 2025-07-28 | End: 2025-08-01

## 2025-07-28 RX ORDER — DOBUTAMINE HYDROCHLORIDE 200 MG/100ML
1.25 INJECTION INTRAVENOUS CONTINUOUS
Status: DISCONTINUED | OUTPATIENT
Start: 2025-07-28 | End: 2025-08-01

## 2025-07-28 RX ORDER — POTASSIUM CHLORIDE 14.9 MG/ML
20 INJECTION INTRAVENOUS ONCE
Status: COMPLETED | OUTPATIENT
Start: 2025-07-28 | End: 2025-07-29

## 2025-07-28 RX ORDER — BUMETANIDE 0.25 MG/ML
2 INJECTION, SOLUTION INTRAMUSCULAR; INTRAVENOUS 3 TIMES DAILY
Status: COMPLETED | OUTPATIENT
Start: 2025-07-28 | End: 2025-07-28

## 2025-07-28 RX ORDER — NICOTINE POLACRILEX 4 MG
15 LOZENGE BUCCAL
Status: DISCONTINUED | OUTPATIENT
Start: 2025-07-28 | End: 2025-08-13

## 2025-07-28 RX ORDER — ENOXAPARIN SODIUM 100 MG/ML
40 INJECTION SUBCUTANEOUS DAILY
Status: DISCONTINUED | OUTPATIENT
Start: 2025-07-28 | End: 2025-08-01

## 2025-07-29 ENCOUNTER — APPOINTMENT (OUTPATIENT)
Dept: ULTRASOUND IMAGING | Facility: HOSPITAL | Age: 45
End: 2025-07-29
Attending: EMERGENCY MEDICINE

## 2025-07-29 ENCOUNTER — APPOINTMENT (OUTPATIENT)
Dept: GENERAL RADIOLOGY | Facility: HOSPITAL | Age: 45
End: 2025-07-29
Attending: EMERGENCY MEDICINE

## 2025-07-29 PROBLEM — K70.30 ALCOHOLIC CIRRHOSIS OF LIVER WITHOUT ASCITES (HCC): Status: ACTIVE | Noted: 2025-07-29

## 2025-07-29 LAB
ALBUMIN SERPL-MCNC: 3.1 G/DL (ref 3.2–4.8)
ALBUMIN SERPL-MCNC: 3.2 G/DL (ref 3.2–4.8)
ALBUMIN/GLOB SERPL: 0.8 (ref 1–2)
ALBUMIN/GLOB SERPL: 0.8 (ref 1–2)
ALP LIVER SERPL-CCNC: 114 U/L (ref 45–117)
ALP LIVER SERPL-CCNC: 118 U/L (ref 45–117)
ALT SERPL-CCNC: 134 U/L (ref 10–49)
ALT SERPL-CCNC: 147 U/L (ref 10–49)
ANION GAP SERPL CALC-SCNC: 6 MMOL/L (ref 0–18)
ANION GAP SERPL CALC-SCNC: 7 MMOL/L (ref 0–18)
ARTERIAL PATENCY WRIST A: POSITIVE
ARTERIAL PATENCY WRIST A: POSITIVE
AST SERPL-CCNC: 119 U/L (ref ?–34)
AST SERPL-CCNC: 154 U/L (ref ?–34)
BASE EXCESS BLDA CALC-SCNC: 6.9 MMOL/L (ref ?–2)
BASE EXCESS BLDA CALC-SCNC: 6.9 MMOL/L (ref ?–2)
BASE EXCESS BLDV CALC-SCNC: 6.1
BASE EXCESS BLDV CALC-SCNC: 7.2
BASE EXCESS BLDV CALC-SCNC: 8
BASE EXCESS BLDV CALC-SCNC: 8.3
BASE EXCESS BLDV CALC-SCNC: 8.6
BASE EXCESS BLDV CALC-SCNC: 8.8
BASE EXCESS BLDV CALC-SCNC: 9.2
BASOPHILS # BLD AUTO: 0.04 X10(3) UL (ref 0–0.2)
BASOPHILS NFR BLD AUTO: 0.4 %
BILIRUB SERPL-MCNC: 1.5 MG/DL (ref 0.3–1.2)
BILIRUB SERPL-MCNC: 1.6 MG/DL (ref 0.3–1.2)
BODY TEMPERATURE: 98.6 F
BODY TEMPERATURE: 98.6 F
BUN BLD-MCNC: 19 MG/DL (ref 9–23)
BUN BLD-MCNC: 19 MG/DL (ref 9–23)
BUN BLD-MCNC: 22 MG/DL (ref 9–23)
BUN BLD-MCNC: 24 MG/DL (ref 9–23)
BUN BLD-MCNC: 25 MG/DL (ref 9–23)
CA-I BLD-SCNC: 1.08 MMOL/L (ref 0.95–1.32)
CA-I BLD-SCNC: 1.1 MMOL/L (ref 0.95–1.32)
CA-I BLD-SCNC: 1.11 MMOL/L (ref 0.95–1.32)
CA-I BLD-SCNC: 1.12 MMOL/L (ref 0.95–1.32)
CALCIUM BLD-MCNC: 8.1 MG/DL (ref 8.7–10.6)
CALCIUM BLD-MCNC: 8.2 MG/DL (ref 8.7–10.6)
CALCIUM BLD-MCNC: 8.4 MG/DL (ref 8.7–10.6)
CHLORIDE SERPL-SCNC: 94 MMOL/L (ref 98–112)
CHLORIDE SERPL-SCNC: 95 MMOL/L (ref 98–112)
CHLORIDE SERPL-SCNC: 96 MMOL/L (ref 98–112)
CO2 SERPL-SCNC: 32 MMOL/L (ref 21–32)
CO2 SERPL-SCNC: 32 MMOL/L (ref 21–32)
CO2 SERPL-SCNC: 33 MMOL/L (ref 21–32)
COHGB MFR BLD: 2.2 % SAT (ref 0–3)
COHGB MFR BLD: 2.2 % SAT (ref 0–3)
CREAT BLD-MCNC: 0.89 MG/DL (ref 0.7–1.3)
CREAT BLD-MCNC: 0.92 MG/DL (ref 0.7–1.3)
CREAT BLD-MCNC: 0.92 MG/DL (ref 0.7–1.3)
CREAT BLD-MCNC: 0.97 MG/DL (ref 0.7–1.3)
CREAT BLD-MCNC: 1.04 MG/DL (ref 0.7–1.3)
EGFRCR SERPLBLD CKD-EPI 2021: 105 ML/MIN/1.73M2 (ref 60–?)
EGFRCR SERPLBLD CKD-EPI 2021: 105 ML/MIN/1.73M2 (ref 60–?)
EGFRCR SERPLBLD CKD-EPI 2021: 108 ML/MIN/1.73M2 (ref 60–?)
EGFRCR SERPLBLD CKD-EPI 2021: 90 ML/MIN/1.73M2 (ref 60–?)
EGFRCR SERPLBLD CKD-EPI 2021: 98 ML/MIN/1.73M2 (ref 60–?)
EOSINOPHIL # BLD AUTO: 0.15 X10(3) UL (ref 0–0.7)
EOSINOPHIL NFR BLD AUTO: 1.6 %
ERYTHROCYTE [DISTWIDTH] IN BLOOD BY AUTOMATED COUNT: 19.1 %
ERYTHROCYTE [DISTWIDTH] IN BLOOD BY AUTOMATED COUNT: 19.5 %
GLOBULIN PLAS-MCNC: 3.8 G/DL (ref 2–3.5)
GLOBULIN PLAS-MCNC: 4.1 G/DL (ref 2–3.5)
GLUCOSE BLD-MCNC: 125 MG/DL (ref 70–99)
GLUCOSE BLD-MCNC: 125 MG/DL (ref 70–99)
GLUCOSE BLD-MCNC: 126 MG/DL (ref 70–99)
GLUCOSE BLD-MCNC: 128 MG/DL (ref 70–99)
GLUCOSE BLD-MCNC: 131 MG/DL (ref 70–99)
GLUCOSE BLD-MCNC: 200 MG/DL (ref 70–99)
GLUCOSE BLD-MCNC: 252 MG/DL (ref 70–99)
HCO3 BLDA-SCNC: 30.3 MEQ/L (ref 21–27)
HCO3 BLDA-SCNC: 30.3 MEQ/L (ref 21–27)
HCO3 BLDV-SCNC: 28 MEQ/L (ref 22–26)
HCO3 BLDV-SCNC: 29 MEQ/L (ref 22–26)
HCO3 BLDV-SCNC: 30.3 MEQ/L (ref 22–26)
HCO3 BLDV-SCNC: 30.5 MEQ/L (ref 22–26)
HCO3 BLDV-SCNC: 30.5 MEQ/L (ref 22–26)
HCO3 BLDV-SCNC: 30.6 MEQ/L (ref 22–26)
HCO3 BLDV-SCNC: 30.9 MEQ/L (ref 22–26)
HCT VFR BLD AUTO: 25.4 % (ref 39–53)
HCT VFR BLD AUTO: 26.4 % (ref 39–53)
HGB BLD-MCNC: 8.1 G/DL (ref 13–17.5)
HGB BLD-MCNC: 8.4 G/DL (ref 13–17.5)
HGB BLD-MCNC: 9 G/DL (ref 13–17.5)
HGB BLD-MCNC: 9 G/DL (ref 13–17.5)
IMM GRANULOCYTES # BLD AUTO: 0.06 X10(3) UL (ref 0–1)
IMM GRANULOCYTES NFR BLD: 0.7 %
L/M: 6 L/MIN
L/M: 6 L/MIN
LACTATE BLD-SCNC: 1 MMOL/L (ref 0.5–2)
LACTATE BLD-SCNC: 1.1 MMOL/L (ref 0.5–2)
LACTATE BLD-SCNC: 2.6 MMOL/L (ref 0.5–2)
LYMPHOCYTES # BLD AUTO: 0.92 X10(3) UL (ref 1–4)
LYMPHOCYTES NFR BLD AUTO: 10 %
MAGNESIUM SERPL-MCNC: 1.9 MG/DL (ref 1.6–2.6)
MAGNESIUM SERPL-MCNC: 2 MG/DL (ref 1.6–2.6)
MAGNESIUM SERPL-MCNC: 2 MG/DL (ref 1.6–2.6)
MCH RBC QN AUTO: 28.5 PG (ref 26–34)
MCH RBC QN AUTO: 28.6 PG (ref 26–34)
MCHC RBC AUTO-ENTMCNC: 31.8 G/DL (ref 31–37)
MCHC RBC AUTO-ENTMCNC: 31.9 G/DL (ref 31–37)
MCV RBC AUTO: 89.4 FL (ref 80–100)
MCV RBC AUTO: 89.8 FL (ref 80–100)
METHGB MFR BLD: 0 % SAT (ref 0.4–1.5)
METHGB MFR BLD: 0 % SAT (ref 0.4–1.5)
MONOCYTES # BLD AUTO: 0.5 X10(3) UL (ref 0.1–1)
MONOCYTES NFR BLD AUTO: 5.4 %
NEUTROPHILS # BLD AUTO: 7.51 X10 (3) UL (ref 1.5–7.7)
NEUTROPHILS # BLD AUTO: 7.51 X10(3) UL (ref 1.5–7.7)
NEUTROPHILS NFR BLD AUTO: 81.9 %
OSMOLALITY SERPL CALC.SUM OF ELEC: 281 MOSM/KG (ref 275–295)
OSMOLALITY SERPL CALC.SUM OF ELEC: 282 MOSM/KG (ref 275–295)
OSMOLALITY SERPL CALC.SUM OF ELEC: 284 MOSM/KG (ref 275–295)
OSMOLALITY SERPL CALC.SUM OF ELEC: 284 MOSM/KG (ref 275–295)
OSMOLALITY SERPL CALC.SUM OF ELEC: 286 MOSM/KG (ref 275–295)
OXYHGB MFR BLDA: 97.8 % (ref 92–100)
OXYHGB MFR BLDA: 97.8 % (ref 92–100)
OXYHGB MFR BLDV: 33.6 % (ref 72–78)
OXYHGB MFR BLDV: 39.8 % (ref 72–78)
OXYHGB MFR BLDV: 50.9 % (ref 72–78)
OXYHGB MFR BLDV: 51.5 % (ref 72–78)
OXYHGB MFR BLDV: 56.4 % (ref 72–78)
OXYHGB MFR BLDV: 58.1 % (ref 72–78)
OXYHGB MFR BLDV: 58.9 % (ref 72–78)
PCO2 BLDA: 43 MM HG (ref 35–45)
PCO2 BLDA: 43 MM HG (ref 35–45)
PCO2 BLDV: 46 MM HG (ref 38–50)
PCO2 BLDV: 47 MM HG (ref 38–50)
PCO2 BLDV: 48 MM HG (ref 38–50)
PCO2 BLDV: 49 MM HG (ref 38–50)
PCO2 BLDV: 49 MM HG (ref 38–50)
PCO2 BLDV: 50 MM HG (ref 38–50)
PCO2 BLDV: 51 MM HG (ref 38–50)
PH BLDA: 7.47 (ref 7.35–7.45)
PH BLDA: 7.47 (ref 7.35–7.45)
PH BLDV: 7.42 (ref 7.33–7.43)
PH BLDV: 7.44 (ref 7.33–7.43)
PH BLDV: 7.44 (ref 7.33–7.43)
PH BLDV: 7.45 (ref 7.33–7.43)
PH BLDV: 7.45 (ref 7.33–7.43)
PH BLDV: 7.46 (ref 7.33–7.43)
PH BLDV: 7.46 (ref 7.33–7.43)
PHOSPHATE SERPL-MCNC: 2.3 MG/DL (ref 2.4–5.1)
PHOSPHATE SERPL-MCNC: 2.5 MG/DL (ref 2.4–5.1)
PHOSPHATE SERPL-MCNC: 2.5 MG/DL (ref 2.4–5.1)
PLATELET # BLD AUTO: 259 10(3)UL (ref 150–450)
PLATELET # BLD AUTO: 262 10(3)UL (ref 150–450)
PO2 BLDA: 123 MM HG (ref 80–100)
PO2 BLDA: 123 MM HG (ref 80–100)
PO2 BLDV: 24 MM HG (ref 30–50)
PO2 BLDV: 28 MM HG (ref 30–50)
PO2 BLDV: 32 MM HG (ref 30–50)
PO2 BLDV: 34 MM HG (ref 30–50)
PO2 BLDV: 35 MM HG (ref 30–50)
POTASSIUM BLD-SCNC: 3.5 MMOL/L (ref 3.6–5.1)
POTASSIUM BLD-SCNC: 3.6 MMOL/L (ref 3.6–5.1)
POTASSIUM BLD-SCNC: 3.8 MMOL/L (ref 3.6–5.1)
POTASSIUM BLD-SCNC: 4 MMOL/L (ref 3.6–5.1)
POTASSIUM SERPL-SCNC: 3.6 MMOL/L (ref 3.5–5.1)
POTASSIUM SERPL-SCNC: 3.6 MMOL/L (ref 3.5–5.1)
POTASSIUM SERPL-SCNC: 3.8 MMOL/L (ref 3.5–5.1)
POTASSIUM SERPL-SCNC: 4.4 MMOL/L (ref 3.5–5.1)
PROT SERPL-MCNC: 6.9 G/DL (ref 5.7–8.2)
PROT SERPL-MCNC: 7.3 G/DL (ref 5.7–8.2)
RBC # BLD AUTO: 2.84 X10(6)UL (ref 4.3–5.7)
RBC # BLD AUTO: 2.94 X10(6)UL (ref 4.3–5.7)
SODIUM BLD-SCNC: 130 MMOL/L (ref 135–145)
SODIUM BLD-SCNC: 130 MMOL/L (ref 135–145)
SODIUM BLD-SCNC: 132 MMOL/L (ref 135–145)
SODIUM BLD-SCNC: 133 MMOL/L (ref 135–145)
SODIUM SERPL-SCNC: 133 MMOL/L (ref 136–145)
SODIUM SERPL-SCNC: 133 MMOL/L (ref 136–145)
SODIUM SERPL-SCNC: 135 MMOL/L (ref 136–145)
VENOUS LITERS PER MINUTE: 6 L/MIN
VENOUS LITERS PER MINUTE: 8 L/MIN
VENOUS LITERS PER MINUTE: 8 L/MIN
WBC # BLD AUTO: 9.1 X10(3) UL (ref 4–11)
WBC # BLD AUTO: 9.2 X10(3) UL (ref 4–11)

## 2025-07-29 PROCEDURE — 71045 X-RAY EXAM CHEST 1 VIEW: CPT | Performed by: EMERGENCY MEDICINE

## 2025-07-29 PROCEDURE — 76705 ECHO EXAM OF ABDOMEN: CPT | Performed by: EMERGENCY MEDICINE

## 2025-07-29 PROCEDURE — 36556 INSERT NON-TUNNEL CV CATH: CPT | Performed by: EMERGENCY MEDICINE

## 2025-07-29 PROCEDURE — 99233 SBSQ HOSP IP/OBS HIGH 50: CPT | Performed by: INTERNAL MEDICINE

## 2025-07-29 PROCEDURE — 99233 SBSQ HOSP IP/OBS HIGH 50: CPT | Performed by: HOSPITALIST

## 2025-07-29 PROCEDURE — 76937 US GUIDE VASCULAR ACCESS: CPT | Performed by: EMERGENCY MEDICINE

## 2025-07-29 PROCEDURE — 99291 CRITICAL CARE FIRST HOUR: CPT | Performed by: EMERGENCY MEDICINE

## 2025-07-29 RX ORDER — POTASSIUM CHLORIDE 14.9 MG/ML
20 INJECTION INTRAVENOUS ONCE
Status: COMPLETED | OUTPATIENT
Start: 2025-07-29 | End: 2025-07-30

## 2025-07-29 RX ORDER — BUMETANIDE 0.25 MG/ML
2 INJECTION, SOLUTION INTRAMUSCULAR; INTRAVENOUS 3 TIMES DAILY
Status: COMPLETED | OUTPATIENT
Start: 2025-07-29 | End: 2025-07-29

## 2025-07-30 LAB
ALBUMIN SERPL-MCNC: 3.1 G/DL (ref 3.2–4.8)
ALBUMIN/GLOB SERPL: 0.8 (ref 1–2)
ALP LIVER SERPL-CCNC: 109 U/L (ref 45–117)
ALT SERPL-CCNC: 105 U/L (ref 10–49)
ANION GAP SERPL CALC-SCNC: 6 MMOL/L (ref 0–18)
ANION GAP SERPL CALC-SCNC: 7 MMOL/L (ref 0–18)
ANION GAP SERPL CALC-SCNC: 7 MMOL/L (ref 0–18)
ANION GAP SERPL CALC-SCNC: 8 MMOL/L (ref 0–18)
AST SERPL-CCNC: 80 U/L (ref ?–34)
ATRIAL RATE: 98 BPM
BASE EXCESS BLDV CALC-SCNC: 5.8
BASE EXCESS BLDV CALC-SCNC: 6.1
BASE EXCESS BLDV CALC-SCNC: 6.9
BASE EXCESS BLDV CALC-SCNC: 7.5
BASE EXCESS BLDV CALC-SCNC: 9.1
BASE EXCESS BLDV CALC-SCNC: 9.7
BILIRUB SERPL-MCNC: 1.4 MG/DL (ref 0.3–1.2)
BUN BLD-MCNC: 14 MG/DL (ref 9–23)
BUN BLD-MCNC: 16 MG/DL (ref 9–23)
CALCIUM BLD-MCNC: 8 MG/DL (ref 8.7–10.6)
CALCIUM BLD-MCNC: 8.1 MG/DL (ref 8.7–10.6)
CALCIUM BLD-MCNC: 8.1 MG/DL (ref 8.7–10.6)
CALCIUM BLD-MCNC: 8.5 MG/DL (ref 8.7–10.6)
CHLORIDE SERPL-SCNC: 95 MMOL/L (ref 98–112)
CHLORIDE SERPL-SCNC: 96 MMOL/L (ref 98–112)
CHLORIDE SERPL-SCNC: 97 MMOL/L (ref 98–112)
CHLORIDE SERPL-SCNC: 97 MMOL/L (ref 98–112)
CO2 SERPL-SCNC: 30 MMOL/L (ref 21–32)
CO2 SERPL-SCNC: 30 MMOL/L (ref 21–32)
CO2 SERPL-SCNC: 32 MMOL/L (ref 21–32)
CO2 SERPL-SCNC: 32 MMOL/L (ref 21–32)
CREAT BLD-MCNC: 0.72 MG/DL (ref 0.7–1.3)
CREAT BLD-MCNC: 0.77 MG/DL (ref 0.7–1.3)
CREAT BLD-MCNC: 0.82 MG/DL (ref 0.7–1.3)
CREAT BLD-MCNC: 0.83 MG/DL (ref 0.7–1.3)
EGFRCR SERPLBLD CKD-EPI 2021: 110 ML/MIN/1.73M2 (ref 60–?)
EGFRCR SERPLBLD CKD-EPI 2021: 110 ML/MIN/1.73M2 (ref 60–?)
EGFRCR SERPLBLD CKD-EPI 2021: 113 ML/MIN/1.73M2 (ref 60–?)
EGFRCR SERPLBLD CKD-EPI 2021: 115 ML/MIN/1.73M2 (ref 60–?)
ERYTHROCYTE [DISTWIDTH] IN BLOOD BY AUTOMATED COUNT: 19.4 %
GLOBULIN PLAS-MCNC: 3.8 G/DL (ref 2–3.5)
GLUCOSE BLD-MCNC: 104 MG/DL (ref 70–99)
GLUCOSE BLD-MCNC: 116 MG/DL (ref 70–99)
GLUCOSE BLD-MCNC: 119 MG/DL (ref 70–99)
GLUCOSE BLD-MCNC: 132 MG/DL (ref 70–99)
GLUCOSE BLD-MCNC: 140 MG/DL (ref 70–99)
GLUCOSE BLD-MCNC: 146 MG/DL (ref 70–99)
GLUCOSE BLD-MCNC: 154 MG/DL (ref 70–99)
GLUCOSE BLD-MCNC: 165 MG/DL (ref 70–99)
HCO3 BLDV-SCNC: 28.4 MEQ/L (ref 22–26)
HCO3 BLDV-SCNC: 28.8 MEQ/L (ref 22–26)
HCO3 BLDV-SCNC: 28.8 MEQ/L (ref 22–26)
HCO3 BLDV-SCNC: 29.6 MEQ/L (ref 22–26)
HCO3 BLDV-SCNC: 30.6 MEQ/L (ref 22–26)
HCO3 BLDV-SCNC: 31.5 MEQ/L (ref 22–26)
HCT VFR BLD AUTO: 26.8 % (ref 39–53)
HGB BLD-MCNC: 8.5 G/DL (ref 13–17.5)
LACTATE BLD-SCNC: 1 MMOL/L (ref 0.5–2)
LACTATE BLD-SCNC: 1.1 MMOL/L (ref 0.5–2)
LACTATE SERPL-SCNC: 1 MMOL/L (ref 0.5–2)
LACTATE SERPL-SCNC: 1.4 MMOL/L (ref 0.5–2)
MAGNESIUM SERPL-MCNC: 1.8 MG/DL (ref 1.6–2.6)
MAGNESIUM SERPL-MCNC: 2 MG/DL (ref 1.6–2.6)
MCH RBC QN AUTO: 28.7 PG (ref 26–34)
MCHC RBC AUTO-ENTMCNC: 31.7 G/DL (ref 31–37)
MCV RBC AUTO: 90.5 FL (ref 80–100)
OSMOLALITY SERPL CALC.SUM OF ELEC: 278 MOSM/KG (ref 275–295)
OSMOLALITY SERPL CALC.SUM OF ELEC: 280 MOSM/KG (ref 275–295)
OSMOLALITY SERPL CALC.SUM OF ELEC: 280 MOSM/KG (ref 275–295)
OSMOLALITY SERPL CALC.SUM OF ELEC: 285 MOSM/KG (ref 275–295)
OXYHGB MFR BLDV: 42.4 % (ref 72–78)
OXYHGB MFR BLDV: 45 % (ref 72–78)
OXYHGB MFR BLDV: 46 % (ref 72–78)
OXYHGB MFR BLDV: 54.5 % (ref 72–78)
OXYHGB MFR BLDV: 57.4 % (ref 72–78)
OXYHGB MFR BLDV: 65.5 % (ref 72–78)
P AXIS: -8 DEGREES
P-R INTERVAL: 174 MS
PCO2 BLDV: 46 MM HG (ref 38–50)
PCO2 BLDV: 46 MM HG (ref 38–50)
PCO2 BLDV: 47 MM HG (ref 38–50)
PCO2 BLDV: 52 MM HG (ref 38–50)
PH BLDV: 7.41 (ref 7.33–7.43)
PH BLDV: 7.43 (ref 7.33–7.43)
PH BLDV: 7.44 (ref 7.33–7.43)
PH BLDV: 7.45 (ref 7.33–7.43)
PH BLDV: 7.47 (ref 7.33–7.43)
PH BLDV: 7.48 (ref 7.33–7.43)
PHOSPHATE SERPL-MCNC: 2.3 MG/DL (ref 2.4–5.1)
PLATELET # BLD AUTO: 248 10(3)UL (ref 150–450)
PO2 BLDV: 28 MM HG (ref 30–50)
PO2 BLDV: 30 MM HG (ref 30–50)
PO2 BLDV: 31 MM HG (ref 30–50)
PO2 BLDV: 34 MM HG (ref 30–50)
PO2 BLDV: 36 MM HG (ref 30–50)
PO2 BLDV: 39 MM HG (ref 30–50)
POTASSIUM SERPL-SCNC: 3.6 MMOL/L (ref 3.5–5.1)
POTASSIUM SERPL-SCNC: 3.7 MMOL/L (ref 3.5–5.1)
POTASSIUM SERPL-SCNC: 4.1 MMOL/L (ref 3.5–5.1)
POTASSIUM SERPL-SCNC: 4.6 MMOL/L (ref 3.5–5.1)
POTASSIUM SERPL-SCNC: 4.6 MMOL/L (ref 3.5–5.1)
PROT SERPL-MCNC: 6.9 G/DL (ref 5.7–8.2)
Q-T INTERVAL: 416 MS
QRS DURATION: 144 MS
QTC CALCULATION (BEZET): 531 MS
R AXIS: -18 DEGREES
RBC # BLD AUTO: 2.96 X10(6)UL (ref 4.3–5.7)
SODIUM SERPL-SCNC: 133 MMOL/L (ref 136–145)
SODIUM SERPL-SCNC: 134 MMOL/L (ref 136–145)
SODIUM SERPL-SCNC: 134 MMOL/L (ref 136–145)
SODIUM SERPL-SCNC: 136 MMOL/L (ref 136–145)
T AXIS: 76 DEGREES
VENOUS LITERS PER MINUTE: 3 L/MIN
VENOUS LITERS PER MINUTE: 3 L/MIN
VENOUS LITERS PER MINUTE: 4 L/MIN
VENOUS LITERS PER MINUTE: 4 L/MIN
VENTRICULAR RATE: 98 BPM
WBC # BLD AUTO: 8.4 X10(3) UL (ref 4–11)

## 2025-07-30 PROCEDURE — 99291 CRITICAL CARE FIRST HOUR: CPT | Performed by: EMERGENCY MEDICINE

## 2025-07-30 PROCEDURE — 99232 SBSQ HOSP IP/OBS MODERATE 35: CPT | Performed by: HOSPITALIST

## 2025-07-30 PROCEDURE — 99233 SBSQ HOSP IP/OBS HIGH 50: CPT | Performed by: INTERNAL MEDICINE

## 2025-07-30 PROCEDURE — 5A0945A ASSISTANCE WITH RESPIRATORY VENTILATION, 24-96 CONSECUTIVE HOURS, HIGH NASAL FLOW/VELOCITY: ICD-10-PCS | Performed by: HOSPITALIST

## 2025-07-30 RX ORDER — POTASSIUM CHLORIDE 1500 MG/1
40 TABLET, EXTENDED RELEASE ORAL ONCE
Status: COMPLETED | OUTPATIENT
Start: 2025-07-30 | End: 2025-07-30

## 2025-07-30 RX ORDER — BUMETANIDE 0.25 MG/ML
2 INJECTION, SOLUTION INTRAMUSCULAR; INTRAVENOUS 3 TIMES DAILY
Status: DISPENSED | OUTPATIENT
Start: 2025-07-30 | End: 2025-07-31

## 2025-07-30 RX ORDER — MAGNESIUM SULFATE HEPTAHYDRATE 40 MG/ML
2 INJECTION, SOLUTION INTRAVENOUS ONCE
Status: COMPLETED | OUTPATIENT
Start: 2025-07-30 | End: 2025-07-30

## 2025-07-30 RX ORDER — ACETAMINOPHEN 500 MG
500 TABLET ORAL EVERY 4 HOURS PRN
Status: DISCONTINUED | OUTPATIENT
Start: 2025-07-30 | End: 2025-08-13

## 2025-07-31 PROBLEM — E11.9 TYPE 2 DIABETES MELLITUS WITHOUT COMPLICATION (HCC): Status: ACTIVE | Noted: 2025-07-31

## 2025-07-31 PROBLEM — R57.0 CARDIOGENIC SHOCK (HCC): Status: ACTIVE | Noted: 2025-07-31

## 2025-07-31 PROBLEM — R57.9 SHOCK (HCC): Status: ACTIVE | Noted: 2025-07-31

## 2025-07-31 LAB
ANION GAP SERPL CALC-SCNC: 5 MMOL/L (ref 0–18)
ANION GAP SERPL CALC-SCNC: 7 MMOL/L (ref 0–18)
ATRIAL RATE: 96 BPM
ATRIAL RATE: 99 BPM
BASE EXCESS BLDV CALC-SCNC: 5.1
BASE EXCESS BLDV CALC-SCNC: 5.6
BASE EXCESS BLDV CALC-SCNC: 6.4
BASE EXCESS BLDV CALC-SCNC: 6.4
BASE EXCESS BLDV CALC-SCNC: 6.5
BASE EXCESS BLDV CALC-SCNC: 6.6
BASOPHILS # BLD AUTO: 0.03 X10(3) UL (ref 0–0.2)
BASOPHILS NFR BLD AUTO: 0.3 %
BUN BLD-MCNC: 11 MG/DL (ref 9–23)
BUN BLD-MCNC: 13 MG/DL (ref 9–23)
CA-I BLD-SCNC: 1.13 MMOL/L (ref 0.95–1.32)
CA-I BLD-SCNC: 1.15 MMOL/L (ref 0.95–1.32)
CA-I BLD-SCNC: 1.15 MMOL/L (ref 0.95–1.32)
CA-I BLD-SCNC: 1.16 MMOL/L (ref 0.95–1.32)
CALCIUM BLD-MCNC: 8.1 MG/DL (ref 8.7–10.6)
CALCIUM BLD-MCNC: 8.1 MG/DL (ref 8.7–10.6)
CHLORIDE SERPL-SCNC: 98 MMOL/L (ref 98–112)
CHLORIDE SERPL-SCNC: 98 MMOL/L (ref 98–112)
CO2 SERPL-SCNC: 31 MMOL/L (ref 21–32)
CO2 SERPL-SCNC: 33 MMOL/L (ref 21–32)
CREAT BLD-MCNC: 0.71 MG/DL (ref 0.7–1.3)
CREAT BLD-MCNC: 0.73 MG/DL (ref 0.7–1.3)
EGFRCR SERPLBLD CKD-EPI 2021: 114 ML/MIN/1.73M2 (ref 60–?)
EGFRCR SERPLBLD CKD-EPI 2021: 115 ML/MIN/1.73M2 (ref 60–?)
EOSINOPHIL # BLD AUTO: 0.32 X10(3) UL (ref 0–0.7)
EOSINOPHIL NFR BLD AUTO: 3.7 %
ERYTHROCYTE [DISTWIDTH] IN BLOOD BY AUTOMATED COUNT: 19.9 %
GLUCOSE BLD-MCNC: 118 MG/DL (ref 70–99)
GLUCOSE BLD-MCNC: 131 MG/DL (ref 70–99)
GLUCOSE BLD-MCNC: 136 MG/DL (ref 70–99)
GLUCOSE BLD-MCNC: 140 MG/DL (ref 70–99)
GLUCOSE BLD-MCNC: 182 MG/DL (ref 70–99)
GLUCOSE BLD-MCNC: 348 MG/DL (ref 70–99)
HCO3 BLDV-SCNC: 27.6 MEQ/L (ref 22–26)
HCO3 BLDV-SCNC: 27.9 MEQ/L (ref 22–26)
HCO3 BLDV-SCNC: 28.8 MEQ/L (ref 22–26)
HCO3 BLDV-SCNC: 28.9 MEQ/L (ref 22–26)
HCO3 BLDV-SCNC: 29 MEQ/L (ref 22–26)
HCO3 BLDV-SCNC: 29.1 MEQ/L (ref 22–26)
HCT VFR BLD AUTO: 27.7 % (ref 39–53)
HGB BLD-MCNC: 8.7 G/DL (ref 13–17.5)
IMM GRANULOCYTES # BLD AUTO: 0.04 X10(3) UL (ref 0–1)
IMM GRANULOCYTES NFR BLD: 0.5 %
LACTATE BLD-SCNC: 1.7 MMOL/L (ref 0.5–2)
LYMPHOCYTES # BLD AUTO: 0.99 X10(3) UL (ref 1–4)
LYMPHOCYTES NFR BLD AUTO: 11.5 %
MAGNESIUM SERPL-MCNC: 1.9 MG/DL (ref 1.6–2.6)
MAGNESIUM SERPL-MCNC: 2 MG/DL (ref 1.6–2.6)
MCH RBC QN AUTO: 28.4 PG (ref 26–34)
MCHC RBC AUTO-ENTMCNC: 31.4 G/DL (ref 31–37)
MCV RBC AUTO: 90.5 FL (ref 80–100)
MONOCYTES # BLD AUTO: 0.47 X10(3) UL (ref 0.1–1)
MONOCYTES NFR BLD AUTO: 5.4 %
NEUTROPHILS # BLD AUTO: 6.79 X10 (3) UL (ref 1.5–7.7)
NEUTROPHILS # BLD AUTO: 6.79 X10(3) UL (ref 1.5–7.7)
NEUTROPHILS NFR BLD AUTO: 78.6 %
OSMOLALITY SERPL CALC.SUM OF ELEC: 284 MOSM/KG (ref 275–295)
OSMOLALITY SERPL CALC.SUM OF ELEC: 284 MOSM/KG (ref 275–295)
OXYHGB MFR BLDV: 45.5 % (ref 72–78)
OXYHGB MFR BLDV: 48.4 % (ref 72–78)
OXYHGB MFR BLDV: 57.1 % (ref 72–78)
OXYHGB MFR BLDV: 58.9 % (ref 72–78)
OXYHGB MFR BLDV: 60.8 % (ref 72–78)
OXYHGB MFR BLDV: 61.2 % (ref 72–78)
P AXIS: 13 DEGREES
P AXIS: 192 DEGREES
P-R INTERVAL: 170 MS
P-R INTERVAL: 176 MS
PCO2 BLDV: 45 MM HG (ref 38–50)
PCO2 BLDV: 47 MM HG (ref 38–50)
PCO2 BLDV: 47 MM HG (ref 38–50)
PCO2 BLDV: 48 MM HG (ref 38–50)
PCO2 BLDV: 48 MM HG (ref 38–50)
PCO2 BLDV: 53 MM HG (ref 38–50)
PH BLDV: 7.4 (ref 7.33–7.43)
PH BLDV: 7.42 (ref 7.33–7.43)
PH BLDV: 7.43 (ref 7.33–7.43)
PH BLDV: 7.43 (ref 7.33–7.43)
PH BLDV: 7.44 (ref 7.33–7.43)
PH BLDV: 7.44 (ref 7.33–7.43)
PHOSPHATE SERPL-MCNC: 2 MG/DL (ref 2.4–5.1)
PLATELET # BLD AUTO: 246 10(3)UL (ref 150–450)
PO2 BLDV: 29 MM HG (ref 30–50)
PO2 BLDV: 30 MM HG (ref 30–50)
PO2 BLDV: 35 MM HG (ref 30–50)
PO2 BLDV: 35 MM HG (ref 30–50)
PO2 BLDV: 37 MM HG (ref 30–50)
PO2 BLDV: 38 MM HG (ref 30–50)
POTASSIUM BLD-SCNC: 3.5 MMOL/L (ref 3.6–5.1)
POTASSIUM BLD-SCNC: 3.6 MMOL/L (ref 3.6–5.1)
POTASSIUM BLD-SCNC: 3.8 MMOL/L (ref 3.6–5.1)
POTASSIUM BLD-SCNC: 4 MMOL/L (ref 3.6–5.1)
POTASSIUM SERPL-SCNC: 3.8 MMOL/L (ref 3.5–5.1)
POTASSIUM SERPL-SCNC: 4 MMOL/L (ref 3.5–5.1)
POTASSIUM SERPL-SCNC: 4 MMOL/L (ref 3.5–5.1)
Q-T INTERVAL: 410 MS
Q-T INTERVAL: 422 MS
QRS DURATION: 140 MS
QRS DURATION: 140 MS
QTC CALCULATION (BEZET): 526 MS
QTC CALCULATION (BEZET): 533 MS
R AXIS: -8 DEGREES
R AXIS: 199 DEGREES
RBC # BLD AUTO: 3.06 X10(6)UL (ref 4.3–5.7)
SODIUM BLD-SCNC: 131 MMOL/L (ref 135–145)
SODIUM BLD-SCNC: 131 MMOL/L (ref 135–145)
SODIUM BLD-SCNC: 132 MMOL/L (ref 135–145)
SODIUM BLD-SCNC: 132 MMOL/L (ref 135–145)
SODIUM SERPL-SCNC: 136 MMOL/L (ref 136–145)
SODIUM SERPL-SCNC: 136 MMOL/L (ref 136–145)
T AXIS: 118 DEGREES
T AXIS: 80 DEGREES
VENOUS LITERS PER MINUTE: 2 L/MIN
VENOUS LITERS PER MINUTE: 3 L/MIN
VENTRICULAR RATE: 96 BPM
VENTRICULAR RATE: 99 BPM
WBC # BLD AUTO: 8.6 X10(3) UL (ref 4–11)

## 2025-07-31 PROCEDURE — 99232 SBSQ HOSP IP/OBS MODERATE 35: CPT | Performed by: HOSPITALIST

## 2025-07-31 PROCEDURE — 99291 CRITICAL CARE FIRST HOUR: CPT | Performed by: HOSPITALIST

## 2025-07-31 RX ORDER — POTASSIUM CHLORIDE 1.5 G/1.58G
40 POWDER, FOR SOLUTION ORAL ONCE
Status: COMPLETED | OUTPATIENT
Start: 2025-07-31 | End: 2025-07-31

## 2025-07-31 RX ORDER — BUMETANIDE 0.25 MG/ML
1 INJECTION, SOLUTION INTRAMUSCULAR; INTRAVENOUS 3 TIMES DAILY
Status: DISCONTINUED | OUTPATIENT
Start: 2025-07-31 | End: 2025-08-01

## 2025-07-31 RX ORDER — INSULIN DEGLUDEC 100 U/ML
5 INJECTION, SOLUTION SUBCUTANEOUS DAILY
Status: DISCONTINUED | OUTPATIENT
Start: 2025-07-31 | End: 2025-08-13

## 2025-07-31 RX ORDER — POTASSIUM CHLORIDE 29.8 MG/ML
40 INJECTION INTRAVENOUS ONCE
Status: COMPLETED | OUTPATIENT
Start: 2025-07-31 | End: 2025-07-31

## 2025-08-01 LAB
ANION GAP SERPL CALC-SCNC: 4 MMOL/L (ref 0–18)
ANION GAP SERPL CALC-SCNC: 7 MMOL/L (ref 0–18)
ANION GAP SERPL CALC-SCNC: 7 MMOL/L (ref 0–18)
BASE EXCESS BLDV CALC-SCNC: 3.2
BASOPHILS # BLD AUTO: 0.05 X10(3) UL (ref 0–0.2)
BASOPHILS NFR BLD AUTO: 0.6 %
BUN BLD-MCNC: 10 MG/DL (ref 9–23)
BUN BLD-MCNC: 11 MG/DL (ref 9–23)
BUN BLD-MCNC: 12 MG/DL (ref 9–23)
CA-I BLD-SCNC: 1.14 MMOL/L (ref 0.95–1.32)
CALCIUM BLD-MCNC: 8.3 MG/DL (ref 8.7–10.6)
CALCIUM BLD-MCNC: 8.3 MG/DL (ref 8.7–10.6)
CALCIUM BLD-MCNC: 8.5 MG/DL (ref 8.7–10.6)
CHLORIDE SERPL-SCNC: 98 MMOL/L (ref 98–112)
CHLORIDE SERPL-SCNC: 98 MMOL/L (ref 98–112)
CHLORIDE SERPL-SCNC: 99 MMOL/L (ref 98–112)
CO2 SERPL-SCNC: 29 MMOL/L (ref 21–32)
CO2 SERPL-SCNC: 30 MMOL/L (ref 21–32)
CO2 SERPL-SCNC: 30 MMOL/L (ref 21–32)
CREAT BLD-MCNC: 0.62 MG/DL (ref 0.7–1.3)
CREAT BLD-MCNC: 0.65 MG/DL (ref 0.7–1.3)
CREAT BLD-MCNC: 0.7 MG/DL (ref 0.7–1.3)
EGFRCR SERPLBLD CKD-EPI 2021: 116 ML/MIN/1.73M2 (ref 60–?)
EGFRCR SERPLBLD CKD-EPI 2021: 118 ML/MIN/1.73M2 (ref 60–?)
EGFRCR SERPLBLD CKD-EPI 2021: 120 ML/MIN/1.73M2 (ref 60–?)
EOSINOPHIL # BLD AUTO: 0.33 X10(3) UL (ref 0–0.7)
EOSINOPHIL NFR BLD AUTO: 4 %
ERYTHROCYTE [DISTWIDTH] IN BLOOD BY AUTOMATED COUNT: 19.9 %
GLUCOSE BLD-MCNC: 103 MG/DL (ref 70–99)
GLUCOSE BLD-MCNC: 109 MG/DL (ref 70–99)
GLUCOSE BLD-MCNC: 128 MG/DL (ref 70–99)
GLUCOSE BLD-MCNC: 131 MG/DL (ref 70–99)
GLUCOSE BLD-MCNC: 156 MG/DL (ref 70–99)
GLUCOSE BLD-MCNC: 169 MG/DL (ref 70–99)
GLUCOSE BLD-MCNC: 189 MG/DL (ref 70–99)
HCO3 BLDV-SCNC: 26.5 MEQ/L (ref 22–26)
HCT VFR BLD AUTO: 28.7 % (ref 39–53)
HGB BLD-MCNC: 9.2 G/DL (ref 13–17.5)
IMM GRANULOCYTES # BLD AUTO: 0.05 X10(3) UL (ref 0–1)
IMM GRANULOCYTES NFR BLD: 0.6 %
LYMPHOCYTES # BLD AUTO: 1.07 X10(3) UL (ref 1–4)
LYMPHOCYTES NFR BLD AUTO: 12.8 %
MAGNESIUM SERPL-MCNC: 1.8 MG/DL (ref 1.6–2.6)
MAGNESIUM SERPL-MCNC: 1.9 MG/DL (ref 1.6–2.6)
MAGNESIUM SERPL-MCNC: 2 MG/DL (ref 1.6–2.6)
MCH RBC QN AUTO: 28.5 PG (ref 26–34)
MCHC RBC AUTO-ENTMCNC: 32.1 G/DL (ref 31–37)
MCV RBC AUTO: 88.9 FL (ref 80–100)
MONOCYTES # BLD AUTO: 0.53 X10(3) UL (ref 0.1–1)
MONOCYTES NFR BLD AUTO: 6.3 %
NEUTROPHILS # BLD AUTO: 6.32 X10 (3) UL (ref 1.5–7.7)
NEUTROPHILS # BLD AUTO: 6.32 X10(3) UL (ref 1.5–7.7)
NEUTROPHILS NFR BLD AUTO: 75.7 %
OSMOLALITY SERPL CALC.SUM OF ELEC: 275 MOSM/KG (ref 275–295)
OSMOLALITY SERPL CALC.SUM OF ELEC: 280 MOSM/KG (ref 275–295)
OSMOLALITY SERPL CALC.SUM OF ELEC: 283 MOSM/KG (ref 275–295)
OXYHGB MFR BLDV: 64.7 % (ref 72–78)
PCO2 BLDV: 45 MM HG (ref 38–50)
PH BLDV: 7.41 (ref 7.33–7.43)
PHOSPHATE SERPL-MCNC: 2.5 MG/DL (ref 2.4–5.1)
PLATELET # BLD AUTO: 240 10(3)UL (ref 150–450)
PO2 BLDV: 40 MM HG (ref 30–50)
POTASSIUM BLD-SCNC: 3.6 MMOL/L (ref 3.6–5.1)
POTASSIUM SERPL-SCNC: 3.5 MMOL/L (ref 3.5–5.1)
POTASSIUM SERPL-SCNC: 3.9 MMOL/L (ref 3.5–5.1)
POTASSIUM SERPL-SCNC: 3.9 MMOL/L (ref 3.5–5.1)
POTASSIUM SERPL-SCNC: 4.4 MMOL/L (ref 3.5–5.1)
RBC # BLD AUTO: 3.23 X10(6)UL (ref 4.3–5.7)
SODIUM BLD-SCNC: 131 MMOL/L (ref 135–145)
SODIUM SERPL-SCNC: 132 MMOL/L (ref 136–145)
SODIUM SERPL-SCNC: 135 MMOL/L (ref 136–145)
SODIUM SERPL-SCNC: 135 MMOL/L (ref 136–145)
VENOUS LITERS PER MINUTE: 2 L/MIN
WBC # BLD AUTO: 8.4 X10(3) UL (ref 4–11)

## 2025-08-01 PROCEDURE — 99232 SBSQ HOSP IP/OBS MODERATE 35: CPT | Performed by: HOSPITALIST

## 2025-08-01 PROCEDURE — 99291 CRITICAL CARE FIRST HOUR: CPT | Performed by: HOSPITALIST

## 2025-08-01 RX ORDER — PANTOPRAZOLE SODIUM 40 MG/1
40 TABLET, DELAYED RELEASE ORAL
Status: DISCONTINUED | OUTPATIENT
Start: 2025-08-01 | End: 2025-08-04

## 2025-08-01 RX ORDER — MAGNESIUM OXIDE 400 MG/1
400 TABLET ORAL ONCE
Status: COMPLETED | OUTPATIENT
Start: 2025-08-01 | End: 2025-08-01

## 2025-08-01 RX ORDER — POTASSIUM CHLORIDE 14.9 MG/ML
20 INJECTION INTRAVENOUS ONCE
Status: COMPLETED | OUTPATIENT
Start: 2025-08-01 | End: 2025-08-01

## 2025-08-01 RX ORDER — PANTOPRAZOLE SODIUM 40 MG/1
40 TABLET, DELAYED RELEASE ORAL
Status: DISCONTINUED | OUTPATIENT
Start: 2025-08-01 | End: 2025-08-01

## 2025-08-01 RX ORDER — ALPRAZOLAM 0.25 MG
0.25 TABLET ORAL 3 TIMES DAILY PRN
Status: DISCONTINUED | OUTPATIENT
Start: 2025-08-01 | End: 2025-08-02

## 2025-08-01 RX ORDER — BUMETANIDE 0.25 MG/ML
1 INJECTION, SOLUTION INTRAMUSCULAR; INTRAVENOUS EVERY 12 HOURS
Status: DISCONTINUED | OUTPATIENT
Start: 2025-08-01 | End: 2025-08-03

## 2025-08-01 RX ORDER — ENOXAPARIN SODIUM 100 MG/ML
40 INJECTION SUBCUTANEOUS EVERY 12 HOURS SCHEDULED
Status: DISCONTINUED | OUTPATIENT
Start: 2025-08-01 | End: 2025-08-04

## 2025-08-02 ENCOUNTER — APPOINTMENT (OUTPATIENT)
Dept: GENERAL RADIOLOGY | Facility: HOSPITAL | Age: 45
End: 2025-08-02
Attending: INTERNAL MEDICINE

## 2025-08-02 ENCOUNTER — APPOINTMENT (OUTPATIENT)
Dept: GENERAL RADIOLOGY | Facility: HOSPITAL | Age: 45
End: 2025-08-02
Attending: HOSPITALIST

## 2025-08-02 LAB
7-AMINOCLONAZ CONF BLD: NEGATIVE NG/ML
7-AMINOCLONAZ CONF BLD: NEGATIVE NG/ML
ALPRAZOLAM CONF BLD: NEGATIVE NG/ML
ALPRAZOLAM CONF BLD: NEGATIVE NG/ML
ANION GAP SERPL CALC-SCNC: 14 MMOL/L (ref 0–18)
ANION GAP SERPL CALC-SCNC: 7 MMOL/L (ref 0–18)
ATRIAL RATE: 109 BPM
BASE EXCESS BLDV CALC-SCNC: 3
BASOPHILS # BLD AUTO: 0.09 X10(3) UL (ref 0–0.2)
BASOPHILS NFR BLD AUTO: 1 %
BENZO CLASS BLD: POSITIVE
BENZO CLASS BLD: POSITIVE
BUN BLD-MCNC: 10 MG/DL (ref 9–23)
BUN BLD-MCNC: 13 MG/DL (ref 9–23)
BUN BLD-MCNC: 14 MG/DL (ref 9–23)
BUN BLD-MCNC: 15 MG/DL (ref 9–23)
CALCIUM BLD-MCNC: 8.4 MG/DL (ref 8.7–10.6)
CALCIUM BLD-MCNC: 8.4 MG/DL (ref 8.7–10.6)
CALCIUM BLD-MCNC: 8.7 MG/DL (ref 8.7–10.6)
CALCIUM BLD-MCNC: 8.7 MG/DL (ref 8.7–10.6)
CHLORDIAZEPOX CONF BLD: NEGATIVE
CHLORDIAZEPOX CONF BLD: NEGATIVE
CHLORIDE SERPL-SCNC: 96 MMOL/L (ref 98–112)
CHLORIDE SERPL-SCNC: 98 MMOL/L (ref 98–112)
CHLORIDE SERPL-SCNC: 99 MMOL/L (ref 98–112)
CHLORIDE SERPL-SCNC: 99 MMOL/L (ref 98–112)
CLONAZEPAM CONF BLD: NEGATIVE NG/ML
CLONAZEPAM CONF BLD: NEGATIVE NG/ML
CO2 SERPL-SCNC: 20 MMOL/L (ref 21–32)
CO2 SERPL-SCNC: 24 MMOL/L (ref 21–32)
CO2 SERPL-SCNC: 27 MMOL/L (ref 21–32)
CO2 SERPL-SCNC: 28 MMOL/L (ref 21–32)
CREAT BLD-MCNC: 0.58 MG/DL (ref 0.7–1.3)
CREAT BLD-MCNC: 0.7 MG/DL (ref 0.7–1.3)
CREAT BLD-MCNC: 0.79 MG/DL (ref 0.7–1.3)
CREAT BLD-MCNC: 0.93 MG/DL (ref 0.7–1.3)
DESALKYLFLURAZ CONF BLD: NEGATIVE NG/ML
DESALKYLFLURAZ CONF BLD: NEGATIVE NG/ML
DESMETHYLDIAZ CONF BLD: NEGATIVE NG/ML
DESMETHYLDIAZ CONF BLD: NEGATIVE NG/ML
DIAZEPAM CONF BLD: NEGATIVE NG/ML
DIAZEPAM CONF BLD: NEGATIVE NG/ML
EGFRCR SERPLBLD CKD-EPI 2021: 103 ML/MIN/1.73M2 (ref 60–?)
EGFRCR SERPLBLD CKD-EPI 2021: 112 ML/MIN/1.73M2 (ref 60–?)
EGFRCR SERPLBLD CKD-EPI 2021: 116 ML/MIN/1.73M2 (ref 60–?)
EGFRCR SERPLBLD CKD-EPI 2021: 123 ML/MIN/1.73M2 (ref 60–?)
EOSINOPHIL # BLD AUTO: 0.25 X10(3) UL (ref 0–0.7)
EOSINOPHIL NFR BLD AUTO: 2.9 %
ERYTHROCYTE [DISTWIDTH] IN BLOOD BY AUTOMATED COUNT: 19.9 %
FLURAZEPAM CONF BLD: NEGATIVE NG/ML
FLURAZEPAM CONF BLD: NEGATIVE NG/ML
GLUCOSE BLD-MCNC: 112 MG/DL (ref 70–99)
GLUCOSE BLD-MCNC: 112 MG/DL (ref 70–99)
GLUCOSE BLD-MCNC: 113 MG/DL (ref 70–99)
GLUCOSE BLD-MCNC: 122 MG/DL (ref 70–99)
GLUCOSE BLD-MCNC: 140 MG/DL (ref 70–99)
GLUCOSE BLD-MCNC: 146 MG/DL (ref 70–99)
GLUCOSE BLD-MCNC: 227 MG/DL (ref 70–99)
GLUCOSE BLD-MCNC: 98 MG/DL (ref 70–99)
HCO3 BLDV-SCNC: 26.1 MEQ/L (ref 22–26)
HCT VFR BLD AUTO: 29.2 % (ref 39–53)
HGB BLD-MCNC: 9.4 G/DL (ref 13–17.5)
IMM GRANULOCYTES # BLD AUTO: 0.05 X10(3) UL (ref 0–1)
IMM GRANULOCYTES NFR BLD: 0.6 %
LACTATE SERPL-SCNC: 1.9 MMOL/L (ref 0.5–2)
LORAZEPAM CONF BLD: NEGATIVE NG/ML
LORAZEPAM CONF BLD: NEGATIVE NG/ML
LYMPHOCYTES # BLD AUTO: 1.57 X10(3) UL (ref 1–4)
LYMPHOCYTES NFR BLD AUTO: 18 %
Lab: NEGATIVE
MAGNESIUM SERPL-MCNC: 1.9 MG/DL (ref 1.6–2.6)
MAGNESIUM SERPL-MCNC: 2 MG/DL (ref 1.6–2.6)
MCH RBC QN AUTO: 28.1 PG (ref 26–34)
MCHC RBC AUTO-ENTMCNC: 32.2 G/DL (ref 31–37)
MCV RBC AUTO: 87.4 FL (ref 80–100)
MIDAZOLAM CONF BLD: 9.5 NG/ML
MIDAZOLAM CONF BLD: 9.5 NG/ML
MONOCYTES # BLD AUTO: 0.68 X10(3) UL (ref 0.1–1)
MONOCYTES NFR BLD AUTO: 7.8 %
NEUTROPHILS # BLD AUTO: 6.09 X10 (3) UL (ref 1.5–7.7)
NEUTROPHILS # BLD AUTO: 6.09 X10(3) UL (ref 1.5–7.7)
NEUTROPHILS NFR BLD AUTO: 69.7 %
OSMOLALITY SERPL CALC.SUM OF ELEC: 270 MOSM/KG (ref 275–295)
OSMOLALITY SERPL CALC.SUM OF ELEC: 272 MOSM/KG (ref 275–295)
OSMOLALITY SERPL CALC.SUM OF ELEC: 277 MOSM/KG (ref 275–295)
OSMOLALITY SERPL CALC.SUM OF ELEC: 278 MOSM/KG (ref 275–295)
OXAZEPAM CONF BLD: NEGATIVE NG/ML
OXAZEPAM CONF BLD: NEGATIVE NG/ML
OXYHGB MFR BLDV: 53.8 % (ref 72–78)
P AXIS: 64 DEGREES
P-R INTERVAL: 164 MS
PCO2 BLDV: 39 MM HG (ref 38–50)
PH BLDV: 7.45 (ref 7.33–7.43)
PHOSPHATE SERPL-MCNC: 2.3 MG/DL (ref 2.4–5.1)
PLATELET # BLD AUTO: 225 10(3)UL (ref 150–450)
PO2 BLDV: 34 MM HG (ref 30–50)
POTASSIUM SERPL-SCNC: 3.9 MMOL/L (ref 3.5–5.1)
POTASSIUM SERPL-SCNC: 4.3 MMOL/L (ref 3.5–5.1)
POTASSIUM SERPL-SCNC: 5 MMOL/L (ref 3.5–5.1)
Q-T INTERVAL: 388 MS
QRS DURATION: 140 MS
QTC CALCULATION (BEZET): 522 MS
R AXIS: -7 DEGREES
RBC # BLD AUTO: 3.34 X10(6)UL (ref 4.3–5.7)
SODIUM SERPL-SCNC: 130 MMOL/L (ref 136–145)
SODIUM SERPL-SCNC: 130 MMOL/L (ref 136–145)
SODIUM SERPL-SCNC: 132 MMOL/L (ref 136–145)
SODIUM SERPL-SCNC: 134 MMOL/L (ref 136–145)
T AXIS: 104 DEGREES
TEMAZEPAM CONF BLD: NEGATIVE NG/ML
TEMAZEPAM CONF BLD: NEGATIVE NG/ML
TRIAZOLAM CONF BLD: NEGATIVE NG/ML
TRIAZOLAM CONF BLD: NEGATIVE NG/ML
VENOUS LITERS PER MINUTE: 1 L/MIN
VENTRICULAR RATE: 109 BPM
WBC # BLD AUTO: 8.7 X10(3) UL (ref 4–11)

## 2025-08-02 PROCEDURE — 71045 X-RAY EXAM CHEST 1 VIEW: CPT | Performed by: INTERNAL MEDICINE

## 2025-08-02 PROCEDURE — 71045 X-RAY EXAM CHEST 1 VIEW: CPT | Performed by: HOSPITALIST

## 2025-08-02 PROCEDURE — 99232 SBSQ HOSP IP/OBS MODERATE 35: CPT | Performed by: HOSPITALIST

## 2025-08-02 PROCEDURE — 5A09357 ASSISTANCE WITH RESPIRATORY VENTILATION, LESS THAN 24 CONSECUTIVE HOURS, CONTINUOUS POSITIVE AIRWAY PRESSURE: ICD-10-PCS | Performed by: INTERNAL MEDICINE

## 2025-08-02 PROCEDURE — 99291 CRITICAL CARE FIRST HOUR: CPT | Performed by: HOSPITALIST

## 2025-08-02 RX ORDER — DOBUTAMINE HYDROCHLORIDE 200 MG/100ML
2.5 INJECTION INTRAVENOUS CONTINUOUS
Status: DISCONTINUED | OUTPATIENT
Start: 2025-08-02 | End: 2025-08-03

## 2025-08-02 RX ORDER — ALPRAZOLAM 1 MG/1
2 TABLET ORAL NIGHTLY PRN
Status: DISCONTINUED | OUTPATIENT
Start: 2025-08-02 | End: 2025-08-04

## 2025-08-02 RX ORDER — ASPIRIN 81 MG/1
81 TABLET ORAL DAILY
Status: DISCONTINUED | OUTPATIENT
Start: 2025-08-02 | End: 2025-08-13

## 2025-08-02 RX ORDER — DULOXETIN HYDROCHLORIDE 20 MG/1
40 CAPSULE, DELAYED RELEASE ORAL DAILY
Status: DISCONTINUED | OUTPATIENT
Start: 2025-08-02 | End: 2025-08-13

## 2025-08-02 RX ORDER — BUMETANIDE 0.25 MG/ML
1 INJECTION, SOLUTION INTRAMUSCULAR; INTRAVENOUS ONCE
Status: COMPLETED | OUTPATIENT
Start: 2025-08-02 | End: 2025-08-02

## 2025-08-02 RX ORDER — GABAPENTIN 300 MG/1
300 CAPSULE ORAL 3 TIMES DAILY
Status: DISCONTINUED | OUTPATIENT
Start: 2025-08-02 | End: 2025-08-08

## 2025-08-02 RX ORDER — QUETIAPINE FUMARATE 25 MG/1
50 TABLET, FILM COATED ORAL 2 TIMES DAILY
Status: DISCONTINUED | OUTPATIENT
Start: 2025-08-02 | End: 2025-08-13

## 2025-08-03 ENCOUNTER — APPOINTMENT (OUTPATIENT)
Dept: GENERAL RADIOLOGY | Facility: HOSPITAL | Age: 45
End: 2025-08-03
Attending: HOSPITALIST

## 2025-08-03 LAB
ATRIAL RATE: 108 BPM
GLUCOSE BLD-MCNC: 108 MG/DL (ref 70–99)
GLUCOSE BLD-MCNC: 108 MG/DL (ref 70–99)
GLUCOSE BLD-MCNC: 260 MG/DL (ref 70–99)
GLUCOSE BLD-MCNC: 89 MG/DL (ref 70–99)
P AXIS: 66 DEGREES
P-R INTERVAL: 158 MS
PHOSPHATE SERPL-MCNC: 4.9 MG/DL (ref 2.4–5.1)
Q-T INTERVAL: 386 MS
QRS DURATION: 146 MS
QTC CALCULATION (BEZET): 517 MS
R AXIS: -28 DEGREES
T AXIS: 89 DEGREES
VENTRICULAR RATE: 108 BPM

## 2025-08-03 PROCEDURE — 71045 X-RAY EXAM CHEST 1 VIEW: CPT | Performed by: HOSPITALIST

## 2025-08-03 PROCEDURE — 99232 SBSQ HOSP IP/OBS MODERATE 35: CPT | Performed by: HOSPITALIST

## 2025-08-03 PROCEDURE — 99291 CRITICAL CARE FIRST HOUR: CPT | Performed by: HOSPITALIST

## 2025-08-03 RX ORDER — BUMETANIDE 1 MG/1
1 TABLET ORAL DAILY
Status: DISCONTINUED | OUTPATIENT
Start: 2025-08-04 | End: 2025-08-03

## 2025-08-03 RX ORDER — CLONAZEPAM 1 MG/1
1 TABLET ORAL 3 TIMES DAILY
Status: DISCONTINUED | OUTPATIENT
Start: 2025-08-03 | End: 2025-08-03

## 2025-08-03 RX ORDER — BUMETANIDE 1 MG/1
1 TABLET ORAL
Status: DISCONTINUED | OUTPATIENT
Start: 2025-08-04 | End: 2025-08-04

## 2025-08-03 RX ORDER — CLONAZEPAM 1 MG/1
1 TABLET ORAL 4 TIMES DAILY
Status: DISCONTINUED | OUTPATIENT
Start: 2025-08-03 | End: 2025-08-04

## 2025-08-03 RX ORDER — BUSPIRONE HYDROCHLORIDE 5 MG/1
10 TABLET ORAL 3 TIMES DAILY
Status: DISCONTINUED | OUTPATIENT
Start: 2025-08-03 | End: 2025-08-08

## 2025-08-04 ENCOUNTER — APPOINTMENT (OUTPATIENT)
Dept: GENERAL RADIOLOGY | Facility: HOSPITAL | Age: 45
End: 2025-08-04
Attending: HOSPITALIST

## 2025-08-04 ENCOUNTER — APPOINTMENT (OUTPATIENT)
Dept: ULTRASOUND IMAGING | Facility: HOSPITAL | Age: 45
End: 2025-08-04
Attending: HOSPITALIST

## 2025-08-04 PROBLEM — I13.10 CARDIORENAL SYNDROME: Status: ACTIVE | Noted: 2025-07-25

## 2025-08-04 LAB
ALBUMIN SERPL-MCNC: 2.9 G/DL (ref 3.2–4.8)
ALBUMIN SERPL-MCNC: 3.1 G/DL (ref 3.2–4.8)
ALBUMIN SERPL-MCNC: 3.6 G/DL (ref 3.2–4.8)
ALP LIVER SERPL-CCNC: 151 U/L (ref 45–117)
ALT SERPL-CCNC: 77 U/L (ref 10–49)
AMPHET IA BLD: NEGATIVE NG/ML
ANION GAP SERPL CALC-SCNC: 17 MMOL/L (ref 0–18)
ANION GAP SERPL CALC-SCNC: 26 MMOL/L (ref 0–18)
ANION GAP SERPL CALC-SCNC: 6 MMOL/L (ref 0–18)
APTT PPP: 37.5 SECONDS (ref 23–36)
APTT PPP: 91.5 SECONDS (ref 23–36)
AST SERPL-CCNC: 110 U/L (ref ?–34)
BASE EXCESS BLDA CALC-SCNC: -14.5 MMOL/L (ref ?–2)
BASE EXCESS BLDA CALC-SCNC: -21.8 MMOL/L (ref ?–2)
BASE EXCESS BLDA CALC-SCNC: 4.2 MMOL/L (ref ?–2)
BASE EXCESS BLDV CALC-SCNC: -20.7
BASE EXCESS BLDV CALC-SCNC: 1.9
BILIRUB DIRECT SERPL-MCNC: 1.7 MG/DL (ref ?–0.3)
BILIRUB SERPL-MCNC: 2.3 MG/DL (ref 0.3–1.2)
BODY TEMPERATURE: 98.6 F
BUN BLD-MCNC: 42 MG/DL (ref 9–23)
BUN BLD-MCNC: 43 MG/DL (ref 9–23)
BUN BLD-MCNC: 44 MG/DL (ref 9–23)
CA-I BLD-SCNC: 1.1 MMOL/L (ref 0.95–1.32)
CA-I BLD-SCNC: 1.14 MMOL/L (ref 0.95–1.32)
CA-I BLD-SCNC: 1.16 MMOL/L (ref 0.95–1.32)
CA-I BLD-SCNC: 1.16 MMOL/L (ref 0.95–1.32)
CA-I BLD-SCNC: 1.18 MMOL/L (ref 0.95–1.32)
CALCIUM BLD-MCNC: 8.7 MG/DL (ref 8.7–10.6)
CALCIUM BLD-MCNC: 8.9 MG/DL (ref 8.7–10.6)
CALCIUM BLD-MCNC: 9.2 MG/DL (ref 8.7–10.6)
CANNABIDIOL BLD: NEGATIVE
CARBOXY-THC BLD: 2.1 NG/ML
CHLORIDE SERPL-SCNC: 92 MMOL/L (ref 98–112)
CHLORIDE SERPL-SCNC: 93 MMOL/L (ref 98–112)
CHLORIDE SERPL-SCNC: 94 MMOL/L (ref 98–112)
CO2 SERPL-SCNC: 11 MMOL/L (ref 21–32)
CO2 SERPL-SCNC: 24 MMOL/L (ref 21–32)
CO2 SERPL-SCNC: 34 MMOL/L (ref 21–32)
COCAINE MET IA BLD: NEGATIVE NG/ML
COHGB MFR BLD: 1.8 % SAT (ref 0–3)
COHGB MFR BLD: 1.9 % SAT (ref 0–3)
COHGB MFR BLD: 2.2 % SAT (ref 0–3)
CREAT BLD-MCNC: 1.68 MG/DL (ref 0.7–1.3)
CREAT BLD-MCNC: 1.96 MG/DL (ref 0.7–1.3)
CREAT BLD-MCNC: 2.12 MG/DL (ref 0.7–1.3)
EGFRCR SERPLBLD CKD-EPI 2021: 38 ML/MIN/1.73M2 (ref 60–?)
EGFRCR SERPLBLD CKD-EPI 2021: 42 ML/MIN/1.73M2 (ref 60–?)
EGFRCR SERPLBLD CKD-EPI 2021: 51 ML/MIN/1.73M2 (ref 60–?)
ERYTHROCYTE [DISTWIDTH] IN BLOOD BY AUTOMATED COUNT: 19.8 %
ERYTHROCYTE [DISTWIDTH] IN BLOOD BY AUTOMATED COUNT: 20.2 %
EXPIRATORY PRESSURE: 6 CM H2O
FIO2: 100 %
FIO2: 100 %
FIO2: 40 %
FIO2: 40 %
FIO2: 80 %
GLUCOSE BLD-MCNC: 116 MG/DL (ref 70–99)
GLUCOSE BLD-MCNC: 118 MG/DL (ref 70–99)
GLUCOSE BLD-MCNC: 130 MG/DL (ref 70–99)
GLUCOSE BLD-MCNC: 134 MG/DL (ref 70–99)
GLUCOSE BLD-MCNC: 153 MG/DL (ref 70–99)
GLUCOSE BLD-MCNC: 197 MG/DL (ref 70–99)
GLUCOSE BLD-MCNC: 198 MG/DL (ref 70–99)
GLUCOSE BLD-MCNC: 220 MG/DL (ref 70–99)
GLUCOSE BLD-MCNC: 40 MG/DL (ref 70–99)
GLUCOSE BLD-MCNC: 45 MG/DL (ref 70–99)
GLUCOSE BLD-MCNC: 75 MG/DL (ref 70–99)
GLUCOSE BLD-MCNC: 86 MG/DL (ref 70–99)
GLUCOSE BLD-MCNC: 89 MG/DL (ref 70–99)
HCO3 BLDA-SCNC: 13.7 MEQ/L (ref 21–27)
HCO3 BLDA-SCNC: 28.2 MEQ/L (ref 21–27)
HCO3 BLDA-SCNC: 8 MEQ/L (ref 21–27)
HCO3 BLDV-SCNC: 26.4 MEQ/L (ref 22–26)
HCO3 BLDV-SCNC: 7.4 MEQ/L (ref 22–26)
HCT VFR BLD AUTO: 27 % (ref 39–53)
HCT VFR BLD AUTO: 32.9 % (ref 39–53)
HGB BLD-MCNC: 10.2 G/DL (ref 13–17.5)
HGB BLD-MCNC: 10.7 G/DL (ref 13–17.5)
HGB BLD-MCNC: 8.6 G/DL (ref 13–17.5)
HGB BLD-MCNC: 9.1 G/DL (ref 13–17.5)
HGB BLD-MCNC: 9.7 G/DL (ref 13–17.5)
HYDROXY-THC BLD: NEGATIVE NG/ML
INR BLD: 2.31 (ref 0.8–1.2)
INSP PRESSURE: 12 CM H2O
LACTATE BLD-SCNC: 14.7 MMOL/L (ref 0.5–2)
LACTATE BLD-SCNC: 2.7 MMOL/L (ref 0.5–2)
LACTATE BLD-SCNC: >16.5 MMOL/L (ref 0.5–2)
LACTATE BLD-SCNC: >16.5 MMOL/L (ref 0.5–2)
Lab: 2.6 UG/ML
Lab: NEGATIVE UG/ML
Lab: POSITIVE
MAGNESIUM SERPL-MCNC: 2.6 MG/DL (ref 1.6–2.6)
MCH RBC QN AUTO: 28.7 PG (ref 26–34)
MCH RBC QN AUTO: 28.8 PG (ref 26–34)
MCHC RBC AUTO-ENTMCNC: 31 G/DL (ref 31–37)
MCHC RBC AUTO-ENTMCNC: 31.9 G/DL (ref 31–37)
MCV RBC AUTO: 90 FL (ref 80–100)
MCV RBC AUTO: 92.9 FL (ref 80–100)
METHADONE IA BLD: NEGATIVE NG/ML
METHGB MFR BLD: 0 % SAT (ref 0.4–1.5)
OPIATES IA BLD: NEGATIVE NG/ML
OSMOLALITY SERPL CALC.SUM OF ELEC: 280 MOSM/KG (ref 275–295)
OSMOLALITY SERPL CALC.SUM OF ELEC: 291 MOSM/KG (ref 275–295)
OSMOLALITY SERPL CALC.SUM OF ELEC: 292 MOSM/KG (ref 275–295)
OXYCODONE IA BLD: NEGATIVE NG/ML
OXYHGB MFR BLDA: 97.8 % (ref 92–100)
OXYHGB MFR BLDA: 98.1 % (ref 92–100)
OXYHGB MFR BLDA: 98.2 % (ref 92–100)
OXYHGB MFR BLDV: 38.8 % (ref 72–78)
OXYHGB MFR BLDV: 96.2 % (ref 72–78)
P/F RATIO: 388 MMHG
P/F RATIO: 415 MMHG
PCO2 BLDA: 22 MM HG (ref 35–45)
PCO2 BLDA: 29 MM HG (ref 35–45)
PCO2 BLDA: 45 MM HG (ref 35–45)
PCO2 BLDV: 41 MM HG (ref 38–50)
PCO2 BLDV: 42 MM HG (ref 38–50)
PH BLDA: 7.08 (ref 7.35–7.45)
PH BLDA: 7.22 (ref 7.35–7.45)
PH BLDA: 7.42 (ref 7.35–7.45)
PH BLDV: 6.99 (ref 7.33–7.43)
PH BLDV: 7.42 (ref 7.33–7.43)
PHENCYCLIDINE IA BLD: NEGATIVE NG/ML
PHOSPHATE SERPL-MCNC: 5.4 MG/DL (ref 2.4–5.1)
PHOSPHATE SERPL-MCNC: 7 MG/DL (ref 2.4–5.1)
PHOSPHATE SERPL-MCNC: 8.8 MG/DL (ref 2.4–5.1)
PLATELET # BLD AUTO: 270 10(3)UL (ref 150–450)
PLATELET # BLD AUTO: 337 10(3)UL (ref 150–450)
PO2 BLDA: 125 MM HG (ref 80–100)
PO2 BLDA: 332 MM HG (ref 80–100)
PO2 BLDA: 388 MM HG (ref 80–100)
PO2 BLDV: 36 MM HG (ref 30–50)
PO2 BLDV: 93 MM HG (ref 30–50)
POTASSIUM BLD-SCNC: 4.7 MMOL/L (ref 3.6–5.1)
POTASSIUM BLD-SCNC: 5.8 MMOL/L (ref 3.6–5.1)
POTASSIUM BLD-SCNC: 6.6 MMOL/L (ref 3.6–5.1)
POTASSIUM BLD-SCNC: 6.7 MMOL/L (ref 3.6–5.1)
POTASSIUM SERPL-SCNC: 4.4 MMOL/L (ref 3.5–5.1)
POTASSIUM SERPL-SCNC: 4.9 MMOL/L (ref 3.5–5.1)
POTASSIUM SERPL-SCNC: 6.3 MMOL/L (ref 3.5–5.1)
PROPOXY IA BLD: NEGATIVE NG/ML
PROT SERPL-MCNC: 8 G/DL (ref 5.7–8.2)
PROTHROMBIN TIME: 25.6 SECONDS (ref 11.6–14.8)
RBC # BLD AUTO: 3 X10(6)UL (ref 4.3–5.7)
RBC # BLD AUTO: 3.54 X10(6)UL (ref 4.3–5.7)
SODIUM BLD-SCNC: 125 MMOL/L (ref 135–145)
SODIUM BLD-SCNC: 126 MMOL/L (ref 135–145)
SODIUM BLD-SCNC: 127 MMOL/L (ref 135–145)
SODIUM BLD-SCNC: 128 MMOL/L (ref 135–145)
SODIUM SERPL-SCNC: 130 MMOL/L (ref 136–145)
SODIUM SERPL-SCNC: 133 MMOL/L (ref 136–145)
SODIUM SERPL-SCNC: 134 MMOL/L (ref 136–145)
THC CLASS BLD: POSITIVE
THC CONF BLD: NEGATIVE NG/ML
VENT RATE: 18 /MIN
VENT RATE: 18 /MIN
WBC # BLD AUTO: 13 X10(3) UL (ref 4–11)
WBC # BLD AUTO: 13.7 X10(3) UL (ref 4–11)

## 2025-08-04 PROCEDURE — 71045 X-RAY EXAM CHEST 1 VIEW: CPT | Performed by: HOSPITALIST

## 2025-08-04 PROCEDURE — 99291 CRITICAL CARE FIRST HOUR: CPT | Performed by: INTERNAL MEDICINE

## 2025-08-04 PROCEDURE — 99233 SBSQ HOSP IP/OBS HIGH 50: CPT | Performed by: HOSPITALIST

## 2025-08-04 PROCEDURE — 36556 INSERT NON-TUNNEL CV CATH: CPT | Performed by: HOSPITALIST

## 2025-08-04 PROCEDURE — 99291 CRITICAL CARE FIRST HOUR: CPT | Performed by: HOSPITALIST

## 2025-08-04 PROCEDURE — 99292 CRITICAL CARE ADDL 30 MIN: CPT | Performed by: HOSPITALIST

## 2025-08-04 PROCEDURE — 93971 EXTREMITY STUDY: CPT | Performed by: HOSPITALIST

## 2025-08-04 RX ORDER — HEPARIN SODIUM AND DEXTROSE 10000; 5 [USP'U]/100ML; G/100ML
18 INJECTION INTRAVENOUS ONCE
Status: COMPLETED | OUTPATIENT
Start: 2025-08-04 | End: 2025-08-04

## 2025-08-04 RX ORDER — DEXTROSE MONOHYDRATE 25 G/50ML
INJECTION, SOLUTION INTRAVENOUS
Status: COMPLETED
Start: 2025-08-04 | End: 2025-08-04

## 2025-08-04 RX ORDER — HEPARIN SODIUM AND DEXTROSE 10000; 5 [USP'U]/100ML; G/100ML
INJECTION INTRAVENOUS CONTINUOUS
Status: DISCONTINUED | OUTPATIENT
Start: 2025-08-04 | End: 2025-08-13

## 2025-08-04 RX ORDER — MILRINONE LACTATE 0.2 MG/ML
0.12 INJECTION, SOLUTION INTRAVENOUS CONTINUOUS
Status: DISCONTINUED | OUTPATIENT
Start: 2025-08-04 | End: 2025-08-04

## 2025-08-04 RX ORDER — VANCOMYCIN HYDROCHLORIDE
15 EVERY 24 HOURS
Status: DISCONTINUED | OUTPATIENT
Start: 2025-08-04 | End: 2025-08-06

## 2025-08-04 RX ORDER — ALPRAZOLAM 0.5 MG
0.5 TABLET ORAL 4 TIMES DAILY PRN
Status: DISCONTINUED | OUTPATIENT
Start: 2025-08-04 | End: 2025-08-13

## 2025-08-04 RX ORDER — CALCIUM GLUCONATE 20 MG/ML
2 INJECTION, SOLUTION INTRAVENOUS ONCE
Status: DISCONTINUED | OUTPATIENT
Start: 2025-08-04 | End: 2025-08-04

## 2025-08-04 RX ORDER — BUMETANIDE 0.25 MG/ML
2 INJECTION, SOLUTION INTRAMUSCULAR; INTRAVENOUS ONCE
Status: COMPLETED | OUTPATIENT
Start: 2025-08-04 | End: 2025-08-04

## 2025-08-04 RX ORDER — CALCIUM GLUCONATE 98 MG/ML
2 INJECTION, SOLUTION INTRAVENOUS ONCE
Status: COMPLETED | OUTPATIENT
Start: 2025-08-04 | End: 2025-08-04

## 2025-08-04 RX ORDER — INDOMETHACIN 25 MG/1
50 CAPSULE ORAL
Status: COMPLETED | OUTPATIENT
Start: 2025-08-04 | End: 2025-08-04

## 2025-08-04 RX ORDER — MAGNESIUM SULFATE HEPTAHYDRATE 40 MG/ML
2 INJECTION, SOLUTION INTRAVENOUS ONCE
Status: COMPLETED | OUTPATIENT
Start: 2025-08-04 | End: 2025-08-04

## 2025-08-04 RX ORDER — HEPARIN SODIUM 1000 [USP'U]/ML
80 INJECTION, SOLUTION INTRAVENOUS; SUBCUTANEOUS ONCE
Status: DISCONTINUED | OUTPATIENT
Start: 2025-08-04 | End: 2025-08-04

## 2025-08-04 RX ORDER — DOBUTAMINE HYDROCHLORIDE 200 MG/100ML
2.5 INJECTION INTRAVENOUS CONTINUOUS
Status: DISCONTINUED | OUTPATIENT
Start: 2025-08-04 | End: 2025-08-13

## 2025-08-04 RX ORDER — DEXTROSE MONOHYDRATE 100 MG/ML
INJECTION, SOLUTION INTRAVENOUS CONTINUOUS
Status: DISCONTINUED | OUTPATIENT
Start: 2025-08-04 | End: 2025-08-04

## 2025-08-04 RX ORDER — PANTOPRAZOLE SODIUM 40 MG/1
40 TABLET, DELAYED RELEASE ORAL 2 TIMES DAILY
Status: DISCONTINUED | OUTPATIENT
Start: 2025-08-04 | End: 2025-08-08

## 2025-08-05 LAB
ALBUMIN SERPL-MCNC: 2.8 G/DL (ref 3.2–4.8)
ALBUMIN/GLOB SERPL: 0.7 (ref 1–2)
ALP LIVER SERPL-CCNC: 119 U/L (ref 45–117)
ALT SERPL-CCNC: 93 U/L (ref 10–49)
ANION GAP SERPL CALC-SCNC: 7 MMOL/L (ref 0–18)
APTT PPP: 131.4 SECONDS (ref 23–36)
APTT PPP: 76.2 SECONDS (ref 23–36)
AST SERPL-CCNC: 136 U/L (ref ?–34)
ATRIAL RATE: 120 BPM
BASE EXCESS BLDA CALC-SCNC: -5.5 MMOL/L (ref ?–2)
BASE EXCESS BLDA CALC-SCNC: 7.2 MMOL/L (ref ?–2)
BASE EXCESS BLDV CALC-SCNC: 7.3
BASOPHILS # BLD AUTO: 0.02 X10(3) UL (ref 0–0.2)
BASOPHILS NFR BLD AUTO: 0.2 %
BILIRUB SERPL-MCNC: 1.6 MG/DL (ref 0.3–1.2)
BODY TEMPERATURE: 98.6 F
BODY TEMPERATURE: 98.6 F
BUN BLD-MCNC: 42 MG/DL (ref 9–23)
CA-I BLD-SCNC: 1.16 MMOL/L (ref 0.95–1.32)
CA-I BLD-SCNC: 1.17 MMOL/L (ref 0.95–1.32)
CALCIUM BLD-MCNC: 8.4 MG/DL (ref 8.7–10.6)
CHLORIDE SERPL-SCNC: 96 MMOL/L (ref 98–112)
CO2 SERPL-SCNC: 34 MMOL/L (ref 21–32)
COHGB MFR BLD: 0.8 % SAT (ref 0–3)
COHGB MFR BLD: 2.4 % SAT (ref 0–3)
CREAT BLD-MCNC: 1.54 MG/DL (ref 0.7–1.3)
EGFRCR SERPLBLD CKD-EPI 2021: 56 ML/MIN/1.73M2 (ref 60–?)
EOSINOPHIL # BLD AUTO: 0.01 X10(3) UL (ref 0–0.7)
EOSINOPHIL NFR BLD AUTO: 0.1 %
ERYTHROCYTE [DISTWIDTH] IN BLOOD BY AUTOMATED COUNT: 20.1 %
EXPIRATORY PRESSURE: 6 CM H2O
FIO2: 30 %
FIO2: 30 %
FIO2: 60 %
GLOBULIN PLAS-MCNC: 3.8 G/DL (ref 2–3.5)
GLUCOSE BLD-MCNC: 110 MG/DL (ref 70–99)
GLUCOSE BLD-MCNC: 110 MG/DL (ref 70–99)
GLUCOSE BLD-MCNC: 122 MG/DL (ref 70–99)
GLUCOSE BLD-MCNC: 123 MG/DL (ref 70–99)
GLUCOSE BLD-MCNC: 156 MG/DL (ref 70–99)
GLUCOSE BLD-MCNC: 195 MG/DL (ref 70–99)
HCO3 BLDA-SCNC: 20.7 MEQ/L (ref 21–27)
HCO3 BLDA-SCNC: 30.6 MEQ/L (ref 21–27)
HCO3 BLDV-SCNC: 30.6 MEQ/L (ref 22–26)
HCT VFR BLD AUTO: 27.6 % (ref 39–53)
HGB BLD-MCNC: 8.8 G/DL (ref 13–17.5)
HGB BLD-MCNC: 9.1 G/DL (ref 13–17.5)
HGB BLD-MCNC: 9.3 G/DL (ref 13–17.5)
IMM GRANULOCYTES # BLD AUTO: 0.05 X10(3) UL (ref 0–1)
IMM GRANULOCYTES NFR BLD: 0.5 %
INSP PRESSURE: 12 CM H2O
LACTATE BLD-SCNC: 0.8 MMOL/L (ref 0.5–2)
LACTATE BLD-SCNC: 7.6 MMOL/L (ref 0.5–2)
LYMPHOCYTES # BLD AUTO: 0.72 X10(3) UL (ref 1–4)
LYMPHOCYTES NFR BLD AUTO: 6.5 %
MAGNESIUM SERPL-MCNC: 2.3 MG/DL (ref 1.6–2.6)
MCH RBC QN AUTO: 28.4 PG (ref 26–34)
MCHC RBC AUTO-ENTMCNC: 31.9 G/DL (ref 31–37)
MCV RBC AUTO: 89 FL (ref 80–100)
METHGB MFR BLD: 0 % SAT (ref 0.4–1.5)
METHGB MFR BLD: 0 % SAT (ref 0.4–1.5)
MONOCYTES # BLD AUTO: 0.73 X10(3) UL (ref 0.1–1)
MONOCYTES NFR BLD AUTO: 6.6 %
NEUTROPHILS # BLD AUTO: 9.5 X10 (3) UL (ref 1.5–7.7)
NEUTROPHILS # BLD AUTO: 9.5 X10(3) UL (ref 1.5–7.7)
NEUTROPHILS NFR BLD AUTO: 86.1 %
OSMOLALITY SERPL CALC.SUM OF ELEC: 295 MOSM/KG (ref 275–295)
OXYHGB MFR BLDA: 97.6 % (ref 92–100)
OXYHGB MFR BLDA: 98.8 % (ref 92–100)
OXYHGB MFR BLDV: 93.4 % (ref 72–78)
P/F RATIO: 417 MMHG
PCO2 BLDA: 39 MM HG (ref 35–45)
PCO2 BLDA: 55 MM HG (ref 35–45)
PCO2 BLDV: 55 MM HG (ref 38–50)
PH BLDA: 7.32 (ref 7.35–7.45)
PH BLDA: 7.39 (ref 7.35–7.45)
PH BLDV: 7.39 (ref 7.33–7.43)
PHOSPHATE SERPL-MCNC: 4.6 MG/DL (ref 2.4–5.1)
PLATELET # BLD AUTO: 297 10(3)UL (ref 150–450)
PLATELET # BLD AUTO: 306 10(3)UL (ref 150–450)
PO2 BLDA: 112 MM HG (ref 80–100)
PO2 BLDA: 250 MM HG (ref 80–100)
PO2 BLDV: 71 MM HG (ref 30–50)
POTASSIUM BLD-SCNC: 3.6 MMOL/L (ref 3.6–5.1)
POTASSIUM BLD-SCNC: 5.3 MMOL/L (ref 3.6–5.1)
POTASSIUM SERPL-SCNC: 3.5 MMOL/L (ref 3.5–5.1)
PROT SERPL-MCNC: 6.6 G/DL (ref 5.7–8.2)
Q-T INTERVAL: 406 MS
QRS DURATION: 174 MS
QTC CALCULATION (BEZET): 583 MS
R AXIS: -76 DEGREES
RBC # BLD AUTO: 3.1 X10(6)UL (ref 4.3–5.7)
SODIUM BLD-SCNC: 128 MMOL/L (ref 135–145)
SODIUM BLD-SCNC: 134 MMOL/L (ref 135–145)
SODIUM SERPL-SCNC: 137 MMOL/L (ref 136–145)
T AXIS: 81 DEGREES
VENTRICULAR RATE: 124 BPM
WBC # BLD AUTO: 11 X10(3) UL (ref 4–11)

## 2025-08-05 PROCEDURE — 99233 SBSQ HOSP IP/OBS HIGH 50: CPT | Performed by: INTERNAL MEDICINE

## 2025-08-05 PROCEDURE — 99232 SBSQ HOSP IP/OBS MODERATE 35: CPT | Performed by: HOSPITALIST

## 2025-08-05 RX ORDER — SPIRONOLACTONE 25 MG/1
12.5 TABLET ORAL DAILY
Status: DISCONTINUED | OUTPATIENT
Start: 2025-08-05 | End: 2025-08-08

## 2025-08-05 RX ORDER — LOSARTAN POTASSIUM 25 MG/1
25 TABLET ORAL DAILY
Status: DISCONTINUED | OUTPATIENT
Start: 2025-08-05 | End: 2025-08-13

## 2025-08-06 LAB
ALBUMIN SERPL-MCNC: 3 G/DL (ref 3.2–4.8)
ALBUMIN/GLOB SERPL: 0.7 (ref 1–2)
ALP LIVER SERPL-CCNC: 132 U/L (ref 45–117)
ALT SERPL-CCNC: 72 U/L (ref 10–49)
ANION GAP SERPL CALC-SCNC: 7 MMOL/L (ref 0–18)
APTT PPP: 95.7 SECONDS (ref 23–36)
AST SERPL-CCNC: 68 U/L (ref ?–34)
BILIRUB SERPL-MCNC: 1.3 MG/DL (ref 0.3–1.2)
BUN BLD-MCNC: 22 MG/DL (ref 9–23)
CALCIUM BLD-MCNC: 8.4 MG/DL (ref 8.7–10.6)
CHLORIDE SERPL-SCNC: 94 MMOL/L (ref 98–112)
CO2 SERPL-SCNC: 36 MMOL/L (ref 21–32)
CREAT BLD-MCNC: 0.88 MG/DL (ref 0.7–1.3)
EGFRCR SERPLBLD CKD-EPI 2021: 108 ML/MIN/1.73M2 (ref 60–?)
ERYTHROCYTE [DISTWIDTH] IN BLOOD BY AUTOMATED COUNT: 19.9 %
GLOBULIN PLAS-MCNC: 4.1 G/DL (ref 2–3.5)
GLUCOSE BLD-MCNC: 114 MG/DL (ref 70–99)
GLUCOSE BLD-MCNC: 119 MG/DL (ref 70–99)
GLUCOSE BLD-MCNC: 123 MG/DL (ref 70–99)
GLUCOSE BLD-MCNC: 129 MG/DL (ref 70–99)
GLUCOSE BLD-MCNC: 153 MG/DL (ref 70–99)
HCT VFR BLD AUTO: 29.9 % (ref 39–53)
HGB BLD-MCNC: 9.5 G/DL (ref 13–17.5)
MAGNESIUM SERPL-MCNC: 1.8 MG/DL (ref 1.6–2.6)
MCH RBC QN AUTO: 28.3 PG (ref 26–34)
MCHC RBC AUTO-ENTMCNC: 31.8 G/DL (ref 31–37)
MCV RBC AUTO: 89 FL (ref 80–100)
OSMOLALITY SERPL CALC.SUM OF ELEC: 288 MOSM/KG (ref 275–295)
PHOSPHATE SERPL-MCNC: 1.3 MG/DL (ref 2.4–5.1)
PLATELET # BLD AUTO: 289 10(3)UL (ref 150–450)
PLATELET # BLD AUTO: 289 10(3)UL (ref 150–450)
POTASSIUM SERPL-SCNC: 2.9 MMOL/L (ref 3.5–5.1)
POTASSIUM SERPL-SCNC: 2.9 MMOL/L (ref 3.5–5.1)
POTASSIUM SERPL-SCNC: 3.1 MMOL/L (ref 3.5–5.1)
PROT SERPL-MCNC: 7.1 G/DL (ref 5.7–8.2)
RBC # BLD AUTO: 3.36 X10(6)UL (ref 4.3–5.7)
SODIUM SERPL-SCNC: 137 MMOL/L (ref 136–145)
WBC # BLD AUTO: 7.7 X10(3) UL (ref 4–11)

## 2025-08-06 PROCEDURE — 99232 SBSQ HOSP IP/OBS MODERATE 35: CPT | Performed by: HOSPITALIST

## 2025-08-06 PROCEDURE — 99233 SBSQ HOSP IP/OBS HIGH 50: CPT | Performed by: INTERNAL MEDICINE

## 2025-08-06 RX ORDER — MAGNESIUM OXIDE 400 MG/1
400 TABLET ORAL ONCE
Status: COMPLETED | OUTPATIENT
Start: 2025-08-06 | End: 2025-08-06

## 2025-08-06 RX ORDER — POTASSIUM CHLORIDE 14.9 MG/ML
20 INJECTION INTRAVENOUS ONCE
Status: DISCONTINUED | OUTPATIENT
Start: 2025-08-06 | End: 2025-08-08

## 2025-08-06 RX ORDER — DOBUTAMINE HYDROCHLORIDE 200 MG/100ML
2.5 INJECTION INTRAVENOUS CONTINUOUS
Qty: 1 EACH | Refills: 3 | Status: SHIPPED | OUTPATIENT
Start: 2025-08-06 | End: 2025-08-06

## 2025-08-06 RX ORDER — ALLOPURINOL 100 MG/1
100 TABLET ORAL DAILY
Status: DISCONTINUED | OUTPATIENT
Start: 2025-08-06 | End: 2025-08-13

## 2025-08-06 RX ORDER — DOBUTAMINE HYDROCHLORIDE 200 MG/100ML
2.5 INJECTION INTRAVENOUS CONTINUOUS
Qty: 1 EACH | Refills: 3 | Status: SHIPPED | OUTPATIENT
Start: 2025-08-06

## 2025-08-06 RX ORDER — POTASSIUM CHLORIDE 1500 MG/1
40 TABLET, EXTENDED RELEASE ORAL ONCE
Status: COMPLETED | OUTPATIENT
Start: 2025-08-06 | End: 2025-08-06

## 2025-08-07 PROBLEM — I82.629 DEEP VEIN THROMBOSIS (DVT) OF UPPER EXTREMITY (HCC): Status: ACTIVE | Noted: 2025-08-07

## 2025-08-07 LAB
ANION GAP SERPL CALC-SCNC: 9 MMOL/L (ref 0–18)
APTT PPP: 47 SECONDS (ref 23–36)
APTT PPP: 50 SECONDS (ref 23–36)
APTT PPP: 64.9 SECONDS (ref 23–36)
BUN BLD-MCNC: 11 MG/DL (ref 9–23)
CALCIUM BLD-MCNC: 9.3 MG/DL (ref 8.7–10.6)
CHLORIDE SERPL-SCNC: 94 MMOL/L (ref 98–112)
CO2 SERPL-SCNC: 31 MMOL/L (ref 21–32)
CREAT BLD-MCNC: 0.67 MG/DL (ref 0.7–1.3)
EGFRCR SERPLBLD CKD-EPI 2021: 117 ML/MIN/1.73M2 (ref 60–?)
ERYTHROCYTE [DISTWIDTH] IN BLOOD BY AUTOMATED COUNT: 19.9 %
ERYTHROCYTE [DISTWIDTH] IN BLOOD BY AUTOMATED COUNT: 19.9 %
GLUCOSE BLD-MCNC: 115 MG/DL (ref 70–99)
GLUCOSE BLD-MCNC: 116 MG/DL (ref 70–99)
GLUCOSE BLD-MCNC: 128 MG/DL (ref 70–99)
GLUCOSE BLD-MCNC: 131 MG/DL (ref 70–99)
GLUCOSE BLD-MCNC: 160 MG/DL (ref 70–99)
GLUCOSE BLD-MCNC: 99 MG/DL (ref 70–99)
HCT VFR BLD AUTO: 34.2 % (ref 39–53)
HCT VFR BLD AUTO: 34.5 % (ref 39–53)
HGB BLD-MCNC: 10.8 G/DL (ref 13–17.5)
HGB BLD-MCNC: 11.1 G/DL (ref 13–17.5)
MAGNESIUM SERPL-MCNC: 1.8 MG/DL (ref 1.6–2.6)
MCH RBC QN AUTO: 28.1 PG (ref 26–34)
MCH RBC QN AUTO: 28.4 PG (ref 26–34)
MCHC RBC AUTO-ENTMCNC: 31.6 G/DL (ref 31–37)
MCHC RBC AUTO-ENTMCNC: 32.2 G/DL (ref 31–37)
MCV RBC AUTO: 88.2 FL (ref 80–100)
MCV RBC AUTO: 89.1 FL (ref 80–100)
OSMOLALITY SERPL CALC.SUM OF ELEC: 277 MOSM/KG (ref 275–295)
PHOSPHATE SERPL-MCNC: 1.5 MG/DL (ref 2.4–5.1)
PLATELET # BLD AUTO: 323 10(3)UL (ref 150–450)
PLATELET # BLD AUTO: 323 10(3)UL (ref 150–450)
PLATELET # BLD AUTO: 353 10(3)UL (ref 150–450)
POTASSIUM SERPL-SCNC: 3.7 MMOL/L (ref 3.5–5.1)
RBC # BLD AUTO: 3.84 X10(6)UL (ref 4.3–5.7)
RBC # BLD AUTO: 3.91 X10(6)UL (ref 4.3–5.7)
SODIUM SERPL-SCNC: 134 MMOL/L (ref 136–145)
WBC # BLD AUTO: 6.1 X10(3) UL (ref 4–11)
WBC # BLD AUTO: 6.6 X10(3) UL (ref 4–11)

## 2025-08-07 PROCEDURE — 99233 SBSQ HOSP IP/OBS HIGH 50: CPT | Performed by: INTERNAL MEDICINE

## 2025-08-07 PROCEDURE — 99232 SBSQ HOSP IP/OBS MODERATE 35: CPT | Performed by: HOSPITALIST

## 2025-08-07 RX ORDER — HEPARIN SODIUM 1000 [USP'U]/ML
30 INJECTION, SOLUTION INTRAVENOUS; SUBCUTANEOUS ONCE
Status: COMPLETED | OUTPATIENT
Start: 2025-08-07 | End: 2025-08-07

## 2025-08-08 LAB
ALBUMIN SERPL-MCNC: 3.5 G/DL (ref 3.2–4.8)
ALBUMIN/GLOB SERPL: 0.7 (ref 1–2)
ALP LIVER SERPL-CCNC: 137 U/L (ref 45–117)
ALT SERPL-CCNC: 47 U/L (ref 10–49)
ANION GAP SERPL CALC-SCNC: 10 MMOL/L (ref 0–18)
APTT PPP: 96.6 SECONDS (ref 23–36)
AST SERPL-CCNC: 34 U/L (ref ?–34)
BILIRUB SERPL-MCNC: 1.3 MG/DL (ref 0.3–1.2)
BUN BLD-MCNC: 13 MG/DL (ref 9–23)
CALCIUM BLD-MCNC: 9.2 MG/DL (ref 8.7–10.6)
CHLORIDE SERPL-SCNC: 93 MMOL/L (ref 98–112)
CO2 SERPL-SCNC: 29 MMOL/L (ref 21–32)
CREAT BLD-MCNC: 0.64 MG/DL (ref 0.7–1.3)
EGFRCR SERPLBLD CKD-EPI 2021: 119 ML/MIN/1.73M2 (ref 60–?)
ERYTHROCYTE [DISTWIDTH] IN BLOOD BY AUTOMATED COUNT: 19.9 %
GLOBULIN PLAS-MCNC: 4.8 G/DL (ref 2–3.5)
GLUCOSE BLD-MCNC: 101 MG/DL (ref 70–99)
GLUCOSE BLD-MCNC: 107 MG/DL (ref 70–99)
GLUCOSE BLD-MCNC: 123 MG/DL (ref 70–99)
GLUCOSE BLD-MCNC: 138 MG/DL (ref 70–99)
GLUCOSE BLD-MCNC: 151 MG/DL (ref 70–99)
HCT VFR BLD AUTO: 35.8 % (ref 39–53)
HGB BLD-MCNC: 11.7 G/DL (ref 13–17.5)
MAGNESIUM SERPL-MCNC: 1.9 MG/DL (ref 1.6–2.6)
MCH RBC QN AUTO: 28.3 PG (ref 26–34)
MCHC RBC AUTO-ENTMCNC: 32.7 G/DL (ref 31–37)
MCV RBC AUTO: 86.5 FL (ref 80–100)
OSMOLALITY SERPL CALC.SUM OF ELEC: 274 MOSM/KG (ref 275–295)
PHOSPHATE SERPL-MCNC: 2.5 MG/DL (ref 2.4–5.1)
PLATELET # BLD AUTO: 325 10(3)UL (ref 150–450)
POTASSIUM SERPL-SCNC: 3.8 MMOL/L (ref 3.5–5.1)
PROT SERPL-MCNC: 8.3 G/DL (ref 5.7–8.2)
RBC # BLD AUTO: 4.14 X10(6)UL (ref 4.3–5.7)
SODIUM SERPL-SCNC: 132 MMOL/L (ref 136–145)
WBC # BLD AUTO: 6.2 X10(3) UL (ref 4–11)

## 2025-08-08 PROCEDURE — 99233 SBSQ HOSP IP/OBS HIGH 50: CPT | Performed by: INTERNAL MEDICINE

## 2025-08-08 PROCEDURE — 99233 SBSQ HOSP IP/OBS HIGH 50: CPT | Performed by: EMERGENCY MEDICINE

## 2025-08-08 PROCEDURE — 99232 SBSQ HOSP IP/OBS MODERATE 35: CPT | Performed by: HOSPITALIST

## 2025-08-08 RX ORDER — SPIRONOLACTONE 25 MG/1
25 TABLET ORAL DAILY
Status: DISCONTINUED | OUTPATIENT
Start: 2025-08-09 | End: 2025-08-13

## 2025-08-08 RX ORDER — TORSEMIDE 20 MG/1
40 TABLET ORAL
Status: DISCONTINUED | OUTPATIENT
Start: 2025-08-08 | End: 2025-08-13

## 2025-08-08 RX ORDER — PANTOPRAZOLE SODIUM 40 MG/1
40 TABLET, DELAYED RELEASE ORAL
Status: DISCONTINUED | OUTPATIENT
Start: 2025-08-08 | End: 2025-08-13

## 2025-08-09 PROBLEM — I50.21 ACUTE SYSTOLIC CONGESTIVE HEART FAILURE (HCC): Status: ACTIVE | Noted: 2022-05-31

## 2025-08-09 LAB
ALBUMIN SERPL-MCNC: 3.5 G/DL (ref 3.2–4.8)
ALBUMIN/GLOB SERPL: 0.7 (ref 1–2)
ALP LIVER SERPL-CCNC: 134 U/L (ref 45–117)
ALT SERPL-CCNC: 38 U/L (ref 10–49)
ANION GAP SERPL CALC-SCNC: 9 MMOL/L (ref 0–18)
APTT PPP: 108 SECONDS (ref 23–36)
APTT PPP: 95.3 SECONDS (ref 23–36)
AST SERPL-CCNC: 31 U/L (ref ?–34)
BILIRUB SERPL-MCNC: 1.2 MG/DL (ref 0.3–1.2)
BUN BLD-MCNC: 14 MG/DL (ref 9–23)
CALCIUM BLD-MCNC: 9.3 MG/DL (ref 8.7–10.6)
CHLORIDE SERPL-SCNC: 94 MMOL/L (ref 98–112)
CO2 SERPL-SCNC: 30 MMOL/L (ref 21–32)
CREAT BLD-MCNC: 0.59 MG/DL (ref 0.7–1.3)
EGFRCR SERPLBLD CKD-EPI 2021: 122 ML/MIN/1.73M2 (ref 60–?)
ERYTHROCYTE [DISTWIDTH] IN BLOOD BY AUTOMATED COUNT: 19.8 %
GLOBULIN PLAS-MCNC: 4.7 G/DL (ref 2–3.5)
GLUCOSE BLD-MCNC: 102 MG/DL (ref 70–99)
GLUCOSE BLD-MCNC: 104 MG/DL (ref 70–99)
GLUCOSE BLD-MCNC: 135 MG/DL (ref 70–99)
GLUCOSE BLD-MCNC: 181 MG/DL (ref 70–99)
GLUCOSE BLD-MCNC: 98 MG/DL (ref 70–99)
HCT VFR BLD AUTO: 34.7 % (ref 39–53)
HGB BLD-MCNC: 11.3 G/DL (ref 13–17.5)
MAGNESIUM SERPL-MCNC: 2 MG/DL (ref 1.6–2.6)
MCH RBC QN AUTO: 28.3 PG (ref 26–34)
MCHC RBC AUTO-ENTMCNC: 32.6 G/DL (ref 31–37)
MCV RBC AUTO: 86.8 FL (ref 80–100)
OSMOLALITY SERPL CALC.SUM OF ELEC: 277 MOSM/KG (ref 275–295)
PHOSPHATE SERPL-MCNC: 3.2 MG/DL (ref 2.4–5.1)
PLATELET # BLD AUTO: 313 10(3)UL (ref 150–450)
POTASSIUM SERPL-SCNC: 3.8 MMOL/L (ref 3.5–5.1)
PROT SERPL-MCNC: 8.2 G/DL (ref 5.7–8.2)
RBC # BLD AUTO: 4 X10(6)UL (ref 4.3–5.7)
SODIUM SERPL-SCNC: 133 MMOL/L (ref 136–145)
WBC # BLD AUTO: 7 X10(3) UL (ref 4–11)

## 2025-08-09 PROCEDURE — 99233 SBSQ HOSP IP/OBS HIGH 50: CPT | Performed by: INTERNAL MEDICINE

## 2025-08-09 PROCEDURE — 99232 SBSQ HOSP IP/OBS MODERATE 35: CPT | Performed by: HOSPITALIST

## 2025-08-10 LAB
ALBUMIN SERPL-MCNC: 3.6 G/DL (ref 3.2–4.8)
ALBUMIN/GLOB SERPL: 0.8 (ref 1–2)
ALP LIVER SERPL-CCNC: 132 U/L (ref 45–117)
ALT SERPL-CCNC: 30 U/L (ref 10–49)
ANION GAP SERPL CALC-SCNC: 10 MMOL/L (ref 0–18)
APTT PPP: 91.4 SECONDS (ref 23–36)
AST SERPL-CCNC: 24 U/L (ref ?–34)
BILIRUB SERPL-MCNC: 1.1 MG/DL (ref 0.3–1.2)
BUN BLD-MCNC: 15 MG/DL (ref 9–23)
CALCIUM BLD-MCNC: 9.7 MG/DL (ref 8.7–10.6)
CHLORIDE SERPL-SCNC: 94 MMOL/L (ref 98–112)
CO2 SERPL-SCNC: 29 MMOL/L (ref 21–32)
CREAT BLD-MCNC: 0.56 MG/DL (ref 0.7–1.3)
EGFRCR SERPLBLD CKD-EPI 2021: 124 ML/MIN/1.73M2 (ref 60–?)
ERYTHROCYTE [DISTWIDTH] IN BLOOD BY AUTOMATED COUNT: 19.7 %
GLOBULIN PLAS-MCNC: 4.6 G/DL (ref 2–3.5)
GLUCOSE BLD-MCNC: 104 MG/DL (ref 70–99)
GLUCOSE BLD-MCNC: 106 MG/DL (ref 70–99)
GLUCOSE BLD-MCNC: 118 MG/DL (ref 70–99)
GLUCOSE BLD-MCNC: 146 MG/DL (ref 70–99)
GLUCOSE BLD-MCNC: 97 MG/DL (ref 70–99)
GLUCOSE BLD-MCNC: 99 MG/DL (ref 70–99)
HCT VFR BLD AUTO: 34.8 % (ref 39–53)
HGB BLD-MCNC: 11.3 G/DL (ref 13–17.5)
MCH RBC QN AUTO: 27.9 PG (ref 26–34)
MCHC RBC AUTO-ENTMCNC: 32.5 G/DL (ref 31–37)
MCV RBC AUTO: 85.9 FL (ref 80–100)
OSMOLALITY SERPL CALC.SUM OF ELEC: 277 MOSM/KG (ref 275–295)
PLATELET # BLD AUTO: 357 10(3)UL (ref 150–450)
POTASSIUM SERPL-SCNC: 3.7 MMOL/L (ref 3.5–5.1)
PROT SERPL-MCNC: 8.2 G/DL (ref 5.7–8.2)
RBC # BLD AUTO: 4.05 X10(6)UL (ref 4.3–5.7)
SODIUM SERPL-SCNC: 133 MMOL/L (ref 136–145)
WBC # BLD AUTO: 6.9 X10(3) UL (ref 4–11)

## 2025-08-10 PROCEDURE — 99232 SBSQ HOSP IP/OBS MODERATE 35: CPT | Performed by: HOSPITALIST

## 2025-08-11 LAB
APTT PPP: 89.8 SECONDS (ref 23–36)
GLUCOSE BLD-MCNC: 104 MG/DL (ref 70–99)
GLUCOSE BLD-MCNC: 120 MG/DL (ref 70–99)
GLUCOSE BLD-MCNC: 122 MG/DL (ref 70–99)
GLUCOSE BLD-MCNC: 96 MG/DL (ref 70–99)

## 2025-08-11 PROCEDURE — 99233 SBSQ HOSP IP/OBS HIGH 50: CPT | Performed by: INTERNAL MEDICINE

## 2025-08-12 ENCOUNTER — APPOINTMENT (OUTPATIENT)
Dept: INTERVENTIONAL RADIOLOGY/VASCULAR | Facility: HOSPITAL | Age: 45
End: 2025-08-12
Attending: NURSE PRACTITIONER

## 2025-08-12 LAB
APTT PPP: 78.2 SECONDS (ref 23–36)
GLUCOSE BLD-MCNC: 109 MG/DL (ref 70–99)
GLUCOSE BLD-MCNC: 111 MG/DL (ref 70–99)
GLUCOSE BLD-MCNC: 216 MG/DL (ref 70–99)
GLUCOSE BLD-MCNC: 406 MG/DL (ref 70–99)
GLUCOSE BLD-MCNC: 83 MG/DL (ref 70–99)
INR BLD: 1.1 (ref 0.8–1.2)
NT-PROBNP SERPL-MCNC: 2170 PG/ML (ref ?–125)
PROTHROMBIN TIME: 14.3 SECONDS (ref 11.6–14.8)

## 2025-08-12 PROCEDURE — 0JH63XZ INSERTION OF TUNNELED VASCULAR ACCESS DEVICE INTO CHEST SUBCUTANEOUS TISSUE AND FASCIA, PERCUTANEOUS APPROACH: ICD-10-PCS | Performed by: RADIOLOGY

## 2025-08-12 PROCEDURE — 99233 SBSQ HOSP IP/OBS HIGH 50: CPT | Performed by: INTERNAL MEDICINE

## 2025-08-12 PROCEDURE — B548ZZA ULTRASONOGRAPHY OF SUPERIOR VENA CAVA, GUIDANCE: ICD-10-PCS | Performed by: RADIOLOGY

## 2025-08-12 PROCEDURE — 02HV33Z INSERTION OF INFUSION DEVICE INTO SUPERIOR VENA CAVA, PERCUTANEOUS APPROACH: ICD-10-PCS | Performed by: RADIOLOGY

## 2025-08-12 RX ORDER — LIDOCAINE HYDROCHLORIDE 10 MG/ML
INJECTION, SOLUTION INFILTRATION; PERINEURAL
Status: COMPLETED
Start: 2025-08-12 | End: 2025-08-12

## 2025-08-12 RX ORDER — PANTOPRAZOLE SODIUM 40 MG/1
40 TABLET, DELAYED RELEASE ORAL
Qty: 180 TABLET | Refills: 0 | Status: SHIPPED | OUTPATIENT
Start: 2025-08-12

## 2025-08-12 RX ORDER — QUETIAPINE FUMARATE 50 MG/1
50 TABLET, FILM COATED ORAL 2 TIMES DAILY
Qty: 60 TABLET | Refills: 0 | Status: SHIPPED | OUTPATIENT
Start: 2025-08-12

## 2025-08-12 RX ORDER — MIDAZOLAM HYDROCHLORIDE 1 MG/ML
INJECTION INTRAMUSCULAR; INTRAVENOUS
Status: COMPLETED
Start: 2025-08-12 | End: 2025-08-12

## 2025-08-12 RX ORDER — ALLOPURINOL 100 MG/1
100 TABLET ORAL DAILY
Qty: 30 TABLET | Refills: 0 | Status: SHIPPED | OUTPATIENT
Start: 2025-08-12

## 2025-08-12 RX ORDER — LIDOCAINE HYDROCHLORIDE AND EPINEPHRINE BITARTRATE 20; .01 MG/ML; MG/ML
INJECTION, SOLUTION SUBCUTANEOUS
Status: COMPLETED
Start: 2025-08-12 | End: 2025-08-12

## 2025-08-12 RX ORDER — ASPIRIN 81 MG/1
81 TABLET ORAL DAILY
Qty: 30 TABLET | Refills: 0 | Status: SHIPPED | OUTPATIENT
Start: 2025-08-12

## 2025-08-12 RX ORDER — DULOXETINE 40 MG/1
40 CAPSULE, DELAYED RELEASE ORAL DAILY
Qty: 30 CAPSULE | Refills: 0 | Status: SHIPPED | OUTPATIENT
Start: 2025-08-12

## 2025-08-13 PROBLEM — K65.9 PERITONITIS (HCC): Status: ACTIVE | Noted: 2025-08-13

## 2025-08-13 LAB
ALBUMIN SERPL-MCNC: 3.7 G/DL (ref 3.2–4.8)
ALBUMIN/GLOB SERPL: 0.8 (ref 1–2)
ALP LIVER SERPL-CCNC: 126 U/L (ref 45–117)
ALT SERPL-CCNC: 22 U/L (ref 10–49)
ANION GAP SERPL CALC-SCNC: 10 MMOL/L (ref 0–18)
APTT PPP: 95.1 SECONDS (ref 23–36)
AST SERPL-CCNC: 29 U/L (ref ?–34)
BILIRUB SERPL-MCNC: 1 MG/DL (ref 0.3–1.2)
BUN BLD-MCNC: 13 MG/DL (ref 9–23)
CALCIUM BLD-MCNC: 9.8 MG/DL (ref 8.7–10.6)
CHLORIDE SERPL-SCNC: 96 MMOL/L (ref 98–112)
CO2 SERPL-SCNC: 29 MMOL/L (ref 21–32)
CREAT BLD-MCNC: 0.59 MG/DL (ref 0.7–1.3)
EGFRCR SERPLBLD CKD-EPI 2021: 122 ML/MIN/1.73M2 (ref 60–?)
ERYTHROCYTE [DISTWIDTH] IN BLOOD BY AUTOMATED COUNT: 19.2 %
GLOBULIN PLAS-MCNC: 4.7 G/DL (ref 2–3.5)
GLUCOSE BLD-MCNC: 125 MG/DL (ref 70–99)
GLUCOSE BLD-MCNC: 129 MG/DL (ref 70–99)
GLUCOSE BLD-MCNC: 98 MG/DL (ref 70–99)
HCT VFR BLD AUTO: 35.7 % (ref 39–53)
HGB BLD-MCNC: 11.8 G/DL (ref 13–17.5)
MCH RBC QN AUTO: 28.2 PG (ref 26–34)
MCHC RBC AUTO-ENTMCNC: 33.1 G/DL (ref 31–37)
MCV RBC AUTO: 85.2 FL (ref 80–100)
OSMOLALITY SERPL CALC.SUM OF ELEC: 280 MOSM/KG (ref 275–295)
PLATELET # BLD AUTO: 358 10(3)UL (ref 150–450)
POTASSIUM SERPL-SCNC: 3.8 MMOL/L (ref 3.5–5.1)
PROT SERPL-MCNC: 8.4 G/DL (ref 5.7–8.2)
RBC # BLD AUTO: 4.19 X10(6)UL (ref 4.3–5.7)
SODIUM SERPL-SCNC: 135 MMOL/L (ref 136–145)
WBC # BLD AUTO: 5.8 X10(3) UL (ref 4–11)

## 2025-08-13 PROCEDURE — 99239 HOSP IP/OBS DSCHRG MGMT >30: CPT | Performed by: HOSPITALIST

## 2025-08-13 RX ORDER — LOSARTAN POTASSIUM 50 MG/1
50 TABLET ORAL DAILY
Qty: 30 TABLET | Refills: 0 | Status: SHIPPED | OUTPATIENT
Start: 2025-08-14

## 2025-08-13 RX ORDER — LOSARTAN POTASSIUM 50 MG/1
50 TABLET ORAL DAILY
Status: DISCONTINUED | OUTPATIENT
Start: 2025-08-13 | End: 2025-08-13

## 2025-08-13 RX ORDER — SPIRONOLACTONE 25 MG/1
25 TABLET ORAL DAILY
Qty: 30 TABLET | Refills: 0 | Status: SHIPPED | OUTPATIENT
Start: 2025-08-13

## 2025-08-15 ENCOUNTER — TELEPHONE (OUTPATIENT)
Dept: CARDIOLOGY UNIT | Facility: HOSPITAL | Age: 45
End: 2025-08-15

## 2025-08-15 VITALS
DIASTOLIC BLOOD PRESSURE: 86 MMHG | HEART RATE: 102 BPM | BODY MASS INDEX: 27 KG/M2 | SYSTOLIC BLOOD PRESSURE: 113 MMHG | OXYGEN SATURATION: 93 % | WEIGHT: 200 LBS | RESPIRATION RATE: 20 BRPM | TEMPERATURE: 98 F

## 2025-08-18 ENCOUNTER — OFFICE VISIT (OUTPATIENT)
Dept: INTERNAL MEDICINE CLINIC | Facility: CLINIC | Age: 45
End: 2025-08-18

## 2025-08-18 VITALS
BODY MASS INDEX: 27.77 KG/M2 | HEART RATE: 106 BPM | TEMPERATURE: 97 F | HEIGHT: 72 IN | OXYGEN SATURATION: 98 % | RESPIRATION RATE: 18 BRPM | DIASTOLIC BLOOD PRESSURE: 64 MMHG | SYSTOLIC BLOOD PRESSURE: 100 MMHG | WEIGHT: 205 LBS

## 2025-08-18 DIAGNOSIS — K59.00 CONSTIPATION, UNSPECIFIED CONSTIPATION TYPE: ICD-10-CM

## 2025-08-18 DIAGNOSIS — K26.5 PERFORATED DUODENAL ULCER (HCC): ICD-10-CM

## 2025-08-18 DIAGNOSIS — E87.1 HYPONATREMIA: ICD-10-CM

## 2025-08-18 DIAGNOSIS — F17.200 TOBACCO DEPENDENCE: ICD-10-CM

## 2025-08-18 DIAGNOSIS — L02.91 ABSCESS: Primary | ICD-10-CM

## 2025-08-18 DIAGNOSIS — D64.9 NORMOCYTIC ANEMIA: ICD-10-CM

## 2025-08-18 DIAGNOSIS — J96.01 ACUTE HYPOXIC RESPIRATORY FAILURE (HCC): ICD-10-CM

## 2025-08-18 DIAGNOSIS — I13.0 CARDIORENAL SYNDROME WITH RENAL FAILURE, STAGE 1-4 OR UNSPECIFIED CHRONIC KIDNEY DISEASE, WITH HEART FAILURE (HCC): ICD-10-CM

## 2025-08-18 DIAGNOSIS — E87.20 METABOLIC ACIDOSIS: ICD-10-CM

## 2025-08-18 DIAGNOSIS — R74.01 TRANSAMINITIS: ICD-10-CM

## 2025-08-18 DIAGNOSIS — F14.10 COCAINE ABUSE (HCC): Chronic | ICD-10-CM

## 2025-08-18 DIAGNOSIS — A41.9 SEPSIS WITH ACUTE ORGAN DYSFUNCTION AND SEPTIC SHOCK, DUE TO UNSPECIFIED ORGANISM, UNSPECIFIED ORGAN DYSFUNCTION TYPE (HCC): Primary | ICD-10-CM

## 2025-08-18 DIAGNOSIS — Z79.899 RECEIVING INOTROPIC MEDICATION: ICD-10-CM

## 2025-08-18 DIAGNOSIS — F12.20 MARIJUANA DEPENDENCE (HCC): Chronic | ICD-10-CM

## 2025-08-18 DIAGNOSIS — I50.20 HFREF (HEART FAILURE WITH REDUCED EJECTION FRACTION) (HCC): ICD-10-CM

## 2025-08-18 DIAGNOSIS — I82.621 ACUTE DEEP VEIN THROMBOSIS (DVT) OF RIGHT UPPER EXTREMITY, UNSPECIFIED VEIN (HCC): ICD-10-CM

## 2025-08-18 DIAGNOSIS — R57.0 CARDIOGENIC SHOCK (HCC): ICD-10-CM

## 2025-08-18 DIAGNOSIS — J15.69 GRAM-NEGATIVE PNEUMONIA (HCC): ICD-10-CM

## 2025-08-18 DIAGNOSIS — B37.89 GASTROINTESTINAL CANDIDIASIS: ICD-10-CM

## 2025-08-18 DIAGNOSIS — Z95.810 USES WEARABLE GARMENT CONTAINING EXTERNAL DEFIBRILLATOR WITH ATTACHED MONITOR: ICD-10-CM

## 2025-08-18 DIAGNOSIS — D75.839 THROMBOCYTOSIS: ICD-10-CM

## 2025-08-18 DIAGNOSIS — E11.9 TYPE 2 DIABETES, DIET CONTROLLED (HCC): ICD-10-CM

## 2025-08-18 DIAGNOSIS — R65.21 SEPSIS WITH ACUTE ORGAN DYSFUNCTION AND SEPTIC SHOCK, DUE TO UNSPECIFIED ORGANISM, UNSPECIFIED ORGAN DYSFUNCTION TYPE (HCC): Primary | ICD-10-CM

## 2025-08-18 DIAGNOSIS — N17.9 AKI (ACUTE KIDNEY INJURY): ICD-10-CM

## 2025-08-18 DIAGNOSIS — I42.0 DILATED CARDIOMYOPATHY (HCC): ICD-10-CM

## 2025-08-18 DIAGNOSIS — E87.5 HYPERKALEMIA: ICD-10-CM

## 2025-08-18 DIAGNOSIS — K65.9 PERITONITIS (HCC): ICD-10-CM

## 2025-08-18 DIAGNOSIS — E86.1 HYPOTENSION DUE TO HYPOVOLEMIA: ICD-10-CM

## 2025-08-18 PROCEDURE — 99215 OFFICE O/P EST HI 40 MIN: CPT | Performed by: NURSE PRACTITIONER

## 2025-08-18 PROCEDURE — 99499 UNLISTED E&M SERVICE: CPT | Performed by: NURSE PRACTITIONER

## 2025-08-18 RX ORDER — BLOOD-GLUCOSE METER
1 EACH MISCELLANEOUS
Qty: 1 KIT | Refills: 0 | Status: SHIPPED | OUTPATIENT
Start: 2025-08-18

## 2025-08-18 RX ORDER — DOCUSATE SODIUM 100 MG/1
100 CAPSULE, LIQUID FILLED ORAL 2 TIMES DAILY PRN
Qty: 30 CAPSULE | Refills: 0 | Status: SHIPPED | OUTPATIENT
Start: 2025-08-18

## 2025-08-18 RX ORDER — TORSEMIDE 20 MG/1
40 TABLET ORAL 2 TIMES DAILY
COMMUNITY

## 2025-08-18 RX ORDER — LANCETS
EACH MISCELLANEOUS
Qty: 100 EACH | Refills: 0 | Status: SHIPPED | OUTPATIENT
Start: 2025-08-18

## 2025-08-19 ENCOUNTER — HOSPITAL ENCOUNTER (OUTPATIENT)
Dept: CT IMAGING | Facility: HOSPITAL | Age: 45
Discharge: HOME | End: 2025-08-19
Attending: RADIOLOGY

## 2025-08-19 DIAGNOSIS — L02.91 ABSCESS: ICD-10-CM

## 2025-08-19 DIAGNOSIS — K65.1 PERITONEAL ABSCESS (HCC): Primary | ICD-10-CM

## 2025-08-19 PROCEDURE — 49424 ASSESS CYST CONTRAST INJECT: CPT | Performed by: RADIOLOGY

## 2025-08-19 PROCEDURE — 76080 X-RAY EXAM OF FISTULA: CPT | Performed by: RADIOLOGY

## 2025-08-21 ENCOUNTER — TELEPHONE (OUTPATIENT)
Dept: INTERNAL MEDICINE CLINIC | Facility: CLINIC | Age: 45
End: 2025-08-21

## 2025-08-21 PROBLEM — Z79.899 RECEIVING INOTROPIC MEDICATION: Status: ACTIVE | Noted: 2025-08-21

## 2025-08-21 PROBLEM — J15.69 GRAM-NEGATIVE PNEUMONIA (HCC): Status: ACTIVE | Noted: 2025-08-21

## 2025-08-21 PROBLEM — Z95.810 USES WEARABLE GARMENT CONTAINING EXTERNAL DEFIBRILLATOR WITH ATTACHED MONITOR: Status: ACTIVE | Noted: 2025-08-21

## 2025-08-21 PROBLEM — E86.1 HYPOTENSION DUE TO HYPOVOLEMIA: Status: ACTIVE | Noted: 2025-08-21

## 2025-08-21 PROBLEM — E11.9 TYPE 2 DIABETES, DIET CONTROLLED (HCC): Status: ACTIVE | Noted: 2025-07-31

## 2025-08-21 PROBLEM — D64.9 NORMOCYTIC ANEMIA: Status: ACTIVE | Noted: 2025-08-21

## 2025-08-21 PROBLEM — K59.00 CONSTIPATION: Status: ACTIVE | Noted: 2025-08-21

## 2025-08-21 PROBLEM — K26.5 PERFORATED DUODENAL ULCER (HCC): Status: ACTIVE | Noted: 2025-08-21

## 2025-08-21 PROBLEM — B37.1: Status: ACTIVE | Noted: 2025-08-21

## 2025-08-21 PROBLEM — D75.839 THROMBOCYTOSIS: Status: ACTIVE | Noted: 2025-08-21

## 2025-08-21 PROBLEM — R74.01 TRANSAMINITIS: Status: ACTIVE | Noted: 2025-08-21

## 2025-08-22 DIAGNOSIS — I50.32 CHRONIC DIASTOLIC HEART FAILURE (HCC): Primary | ICD-10-CM

## 2025-08-24 PROBLEM — I48.92 ATRIAL FLUTTER (HCC): Status: ACTIVE | Noted: 2025-08-24

## 2025-08-24 PROBLEM — Z79.01 ANTICOAGULATED ON APIXABAN: Status: ACTIVE | Noted: 2025-08-24

## 2025-08-24 PROBLEM — F10.21 ALCOHOL DEPENDENCE IN REMISSION (HCC): Status: ACTIVE | Noted: 2022-05-31

## 2025-08-25 ENCOUNTER — HOSPITAL ENCOUNTER (OUTPATIENT)
Dept: CT IMAGING | Facility: HOSPITAL | Age: 45
Discharge: HOME | End: 2025-08-25
Attending: PHYSICIAN ASSISTANT

## 2025-08-25 ENCOUNTER — TELEPHONE (OUTPATIENT)
Dept: CARDIOLOGY CLINIC | Facility: HOSPITAL | Age: 45
End: 2025-08-25

## 2025-08-25 ENCOUNTER — NURSE ONLY (OUTPATIENT)
Dept: LAB | Facility: HOSPITAL | Age: 45
End: 2025-08-25
Attending: PHYSICIAN ASSISTANT

## 2025-08-25 VITALS
DIASTOLIC BLOOD PRESSURE: 70 MMHG | RESPIRATION RATE: 34 BRPM | OXYGEN SATURATION: 97 % | HEART RATE: 88 BPM | SYSTOLIC BLOOD PRESSURE: 98 MMHG | TEMPERATURE: 98 F

## 2025-08-25 DIAGNOSIS — K65.1 PERITONEAL ABSCESS (HCC): ICD-10-CM

## 2025-08-25 DIAGNOSIS — I50.32 CHRONIC DIASTOLIC HEART FAILURE (HCC): Primary | ICD-10-CM

## 2025-08-25 DIAGNOSIS — I50.32 CHRONIC DIASTOLIC HEART FAILURE (HCC): ICD-10-CM

## 2025-08-25 DIAGNOSIS — K65.1 PERITONEAL ABSCESS (HCC): Primary | ICD-10-CM

## 2025-08-25 LAB
ANION GAP SERPL CALC-SCNC: 10 MMOL/L (ref 0–18)
BUN BLD-MCNC: 12 MG/DL (ref 9–23)
CALCIUM BLD-MCNC: 9.8 MG/DL (ref 8.7–10.6)
CHLORIDE SERPL-SCNC: 101 MMOL/L (ref 98–112)
CO2 SERPL-SCNC: 28 MMOL/L (ref 21–32)
CREAT BLD-MCNC: 0.63 MG/DL (ref 0.7–1.3)
EGFRCR SERPLBLD CKD-EPI 2021: 120 ML/MIN/1.73M2 (ref 60–?)
ERYTHROCYTE [DISTWIDTH] IN BLOOD BY AUTOMATED COUNT: 17.2 %
FASTING STATUS PATIENT QL REPORTED: YES
GLUCOSE BLD-MCNC: 115 MG/DL (ref 70–99)
GLUCOSE BLD-MCNC: 116 MG/DL (ref 70–99)
HCT VFR BLD AUTO: 33.8 % (ref 39–53)
HGB BLD-MCNC: 11.2 G/DL (ref 13–17.5)
INR BLD: 1.1 (ref 0.8–1.2)
MCH RBC QN AUTO: 28.1 PG (ref 26–34)
MCHC RBC AUTO-ENTMCNC: 33.1 G/DL (ref 31–37)
MCV RBC AUTO: 84.9 FL (ref 80–100)
NT-PROBNP SERPL-MCNC: 3357 PG/ML (ref ?–125)
OSMOLALITY SERPL CALC.SUM OF ELEC: 289 MOSM/KG (ref 275–295)
PLATELET # BLD AUTO: 371 10(3)UL (ref 150–450)
POTASSIUM SERPL-SCNC: 2.9 MMOL/L (ref 3.5–5.1)
PROTHROMBIN TIME: 14.2 SECONDS (ref 11.6–14.8)
RBC # BLD AUTO: 3.98 X10(6)UL (ref 4.3–5.7)
SODIUM SERPL-SCNC: 139 MMOL/L (ref 136–145)
WBC # BLD AUTO: 7.1 X10(3) UL (ref 4–11)

## 2025-08-25 PROCEDURE — 80048 BASIC METABOLIC PNL TOTAL CA: CPT

## 2025-08-25 PROCEDURE — 49424 ASSESS CYST CONTRAST INJECT: CPT | Performed by: PHYSICIAN ASSISTANT

## 2025-08-25 PROCEDURE — 82962 GLUCOSE BLOOD TEST: CPT

## 2025-08-25 PROCEDURE — 83880 ASSAY OF NATRIURETIC PEPTIDE: CPT

## 2025-08-25 PROCEDURE — 76080 X-RAY EXAM OF FISTULA: CPT | Performed by: PHYSICIAN ASSISTANT

## 2025-08-25 PROCEDURE — 85610 PROTHROMBIN TIME: CPT

## 2025-08-25 PROCEDURE — 36415 COLL VENOUS BLD VENIPUNCTURE: CPT

## 2025-08-25 PROCEDURE — 85027 COMPLETE CBC AUTOMATED: CPT

## 2025-08-25 RX ORDER — SODIUM CHLORIDE 9 MG/ML
INJECTION, SOLUTION INTRAVENOUS CONTINUOUS
Status: DISCONTINUED | OUTPATIENT
Start: 2025-08-25 | End: 2025-08-27

## 2025-08-25 RX ORDER — FLUMAZENIL 0.1 MG/ML
0.2 INJECTION INTRAVENOUS AS NEEDED
Status: DISCONTINUED | OUTPATIENT
Start: 2025-08-25 | End: 2025-08-27

## 2025-08-25 RX ORDER — NALOXONE HYDROCHLORIDE 0.4 MG/ML
0.08 INJECTION, SOLUTION INTRAMUSCULAR; INTRAVENOUS; SUBCUTANEOUS AS NEEDED
Status: DISCONTINUED | OUTPATIENT
Start: 2025-08-25 | End: 2025-08-27

## 2025-08-25 RX ORDER — MIDAZOLAM HYDROCHLORIDE 1 MG/ML
1 INJECTION INTRAMUSCULAR; INTRAVENOUS EVERY 5 MIN PRN
Status: DISCONTINUED | OUTPATIENT
Start: 2025-08-25 | End: 2025-08-27

## 2025-08-25 RX ORDER — MIDAZOLAM HYDROCHLORIDE 1 MG/ML
INJECTION INTRAMUSCULAR; INTRAVENOUS
Status: DISCONTINUED
Start: 2025-08-25 | End: 2025-08-25 | Stop reason: WASHOUT

## 2025-08-25 RX ORDER — POTASSIUM CHLORIDE 1500 MG/1
60 TABLET, EXTENDED RELEASE ORAL ONCE
Status: ACTIVE | OUTPATIENT
Start: 2025-08-25

## 2025-08-25 RX ADMIN — SODIUM CHLORIDE: 9 INJECTION, SOLUTION INTRAVENOUS at 11:18:00

## 2025-08-26 ENCOUNTER — APPOINTMENT (OUTPATIENT)
Dept: GENERAL RADIOLOGY | Facility: HOSPITAL | Age: 45
End: 2025-08-26
Attending: EMERGENCY MEDICINE

## 2025-08-26 ENCOUNTER — HOSPITAL ENCOUNTER (INPATIENT)
Facility: HOSPITAL | Age: 45
LOS: 1 days | Discharge: HOME HEALTH | End: 2025-08-27
Attending: EMERGENCY MEDICINE | Admitting: HOSPITALIST

## 2025-08-26 ENCOUNTER — APPOINTMENT (OUTPATIENT)
Dept: GENERAL RADIOLOGY | Facility: HOSPITAL | Age: 45
End: 2025-08-26
Attending: PHYSICIAN ASSISTANT

## 2025-08-26 ENCOUNTER — TELEPHONE (OUTPATIENT)
Dept: CARDIOLOGY CLINIC | Facility: HOSPITAL | Age: 45
End: 2025-08-26

## 2025-08-26 ENCOUNTER — OFFICE VISIT (OUTPATIENT)
Dept: INTERNAL MEDICINE CLINIC | Facility: CLINIC | Age: 45
End: 2025-08-26

## 2025-08-26 ENCOUNTER — APPOINTMENT (OUTPATIENT)
Dept: CT IMAGING | Facility: HOSPITAL | Age: 45
End: 2025-08-26
Attending: PHYSICIAN ASSISTANT

## 2025-08-26 VITALS
BODY MASS INDEX: 28.07 KG/M2 | DIASTOLIC BLOOD PRESSURE: 48 MMHG | TEMPERATURE: 97 F | SYSTOLIC BLOOD PRESSURE: 80 MMHG | HEIGHT: 72 IN | HEART RATE: 53 BPM | OXYGEN SATURATION: 98 % | WEIGHT: 207.25 LBS | RESPIRATION RATE: 31 BRPM

## 2025-08-26 DIAGNOSIS — E86.1 HYPOTENSION DUE TO HYPOVOLEMIA: ICD-10-CM

## 2025-08-26 DIAGNOSIS — I50.20 HFREF (HEART FAILURE WITH REDUCED EJECTION FRACTION) (HCC): ICD-10-CM

## 2025-08-26 DIAGNOSIS — R00.1 BRADYCARDIA: ICD-10-CM

## 2025-08-26 DIAGNOSIS — I48.92 ATRIAL FLUTTER, UNSPECIFIED TYPE (HCC): ICD-10-CM

## 2025-08-26 DIAGNOSIS — E11.9 TYPE 2 DIABETES, DIET CONTROLLED (HCC): ICD-10-CM

## 2025-08-26 DIAGNOSIS — I42.0 DILATED CARDIOMYOPATHY (HCC): ICD-10-CM

## 2025-08-26 DIAGNOSIS — F17.200 TOBACCO DEPENDENCE: ICD-10-CM

## 2025-08-26 DIAGNOSIS — K65.9 PERITONITIS (HCC): ICD-10-CM

## 2025-08-26 DIAGNOSIS — Z95.810 USES WEARABLE GARMENT CONTAINING EXTERNAL DEFIBRILLATOR WITH ATTACHED MONITOR: ICD-10-CM

## 2025-08-26 DIAGNOSIS — I95.9 HYPOTENSION, UNSPECIFIED HYPOTENSION TYPE: Primary | ICD-10-CM

## 2025-08-26 DIAGNOSIS — F14.10 COCAINE ABUSE (HCC): Chronic | ICD-10-CM

## 2025-08-26 DIAGNOSIS — R57.0 CARDIOGENIC SHOCK (HCC): Primary | ICD-10-CM

## 2025-08-26 DIAGNOSIS — Z79.01 ANTICOAGULATED ON APIXABAN: ICD-10-CM

## 2025-08-26 DIAGNOSIS — F12.20 MARIJUANA DEPENDENCE (HCC): Chronic | ICD-10-CM

## 2025-08-26 DIAGNOSIS — Z79.899 RECEIVING INOTROPIC MEDICATION: ICD-10-CM

## 2025-08-26 DIAGNOSIS — F10.21 ALCOHOL DEPENDENCE IN REMISSION (HCC): ICD-10-CM

## 2025-08-26 DIAGNOSIS — L08.9 SOFT TISSUE INFECTION: ICD-10-CM

## 2025-08-26 LAB
ALBUMIN SERPL-MCNC: 3.8 G/DL (ref 3.2–4.8)
ALBUMIN/GLOB SERPL: 0.9 (ref 1–2)
ALP LIVER SERPL-CCNC: 95 U/L (ref 45–117)
ALT SERPL-CCNC: 9 U/L (ref 10–49)
ANION GAP SERPL CALC-SCNC: 11 MMOL/L (ref 0–18)
AST SERPL-CCNC: 19 U/L (ref ?–34)
ATRIAL RATE: 96 BPM
BASOPHILS # BLD AUTO: 0.09 X10(3) UL (ref 0–0.2)
BASOPHILS NFR BLD AUTO: 1 %
BILIRUB SERPL-MCNC: 1.2 MG/DL (ref 0.3–1.2)
BUN BLD-MCNC: 11 MG/DL (ref 9–23)
CALCIUM BLD-MCNC: 9.7 MG/DL (ref 8.7–10.6)
CHLORIDE SERPL-SCNC: 98 MMOL/L (ref 98–112)
CO2 SERPL-SCNC: 28 MMOL/L (ref 21–32)
CREAT BLD-MCNC: 0.81 MG/DL (ref 0.7–1.3)
EGFRCR SERPLBLD CKD-EPI 2021: 111 ML/MIN/1.73M2 (ref 60–?)
EOSINOPHIL # BLD AUTO: 0.15 X10(3) UL (ref 0–0.7)
EOSINOPHIL NFR BLD AUTO: 1.7 %
ERYTHROCYTE [DISTWIDTH] IN BLOOD BY AUTOMATED COUNT: 17.2 %
GLOBULIN PLAS-MCNC: 4.4 G/DL (ref 2–3.5)
GLUCOSE BLD-MCNC: 109 MG/DL (ref 70–99)
GLUCOSE BLD-MCNC: 113 MG/DL (ref 70–99)
GLUCOSE BLD-MCNC: 134 MG/DL (ref 70–99)
GLUCOSE BLD-MCNC: 138 MG/DL (ref 70–99)
HCT VFR BLD AUTO: 34.4 % (ref 39–53)
HGB BLD-MCNC: 11.4 G/DL (ref 13–17.5)
IMM GRANULOCYTES # BLD AUTO: 0.03 X10(3) UL (ref 0–1)
IMM GRANULOCYTES NFR BLD: 0.3 %
LACTATE SERPL-SCNC: 1.3 MMOL/L (ref 0.5–2)
LIPASE SERPL-CCNC: 28 U/L (ref 12–53)
LYMPHOCYTES # BLD AUTO: 2.11 X10(3) UL (ref 1–4)
LYMPHOCYTES NFR BLD AUTO: 23.3 %
MCH RBC QN AUTO: 27.9 PG (ref 26–34)
MCHC RBC AUTO-ENTMCNC: 33.1 G/DL (ref 31–37)
MCV RBC AUTO: 84.3 FL (ref 80–100)
MONOCYTES # BLD AUTO: 0.68 X10(3) UL (ref 0.1–1)
MONOCYTES NFR BLD AUTO: 7.5 %
NEUTROPHILS # BLD AUTO: 6.01 X10 (3) UL (ref 1.5–7.7)
NEUTROPHILS # BLD AUTO: 6.01 X10(3) UL (ref 1.5–7.7)
NEUTROPHILS NFR BLD AUTO: 66.2 %
OSMOLALITY SERPL CALC.SUM OF ELEC: 285 MOSM/KG (ref 275–295)
P AXIS: 8 DEGREES
P-R INTERVAL: 178 MS
PLATELET # BLD AUTO: 385 10(3)UL (ref 150–450)
POTASSIUM SERPL-SCNC: 3.1 MMOL/L (ref 3.5–5.1)
PROT SERPL-MCNC: 8.2 G/DL (ref 5.7–8.2)
Q-T INTERVAL: 412 MS
QRS DURATION: 144 MS
QTC CALCULATION (BEZET): 520 MS
R AXIS: -32 DEGREES
RBC # BLD AUTO: 4.08 X10(6)UL (ref 4.3–5.7)
SODIUM SERPL-SCNC: 137 MMOL/L (ref 136–145)
T AXIS: 125 DEGREES
TROPONIN I SERPL HS-MCNC: 26 NG/L (ref ?–53)
VENTRICULAR RATE: 96 BPM
WBC # BLD AUTO: 9.1 X10(3) UL (ref 4–11)

## 2025-08-26 PROCEDURE — 99215 OFFICE O/P EST HI 40 MIN: CPT | Performed by: NURSE PRACTITIONER

## 2025-08-26 PROCEDURE — 74177 CT ABD & PELVIS W/CONTRAST: CPT | Performed by: PHYSICIAN ASSISTANT

## 2025-08-26 PROCEDURE — 99223 1ST HOSP IP/OBS HIGH 75: CPT | Performed by: HOSPITALIST

## 2025-08-26 PROCEDURE — 71045 X-RAY EXAM CHEST 1 VIEW: CPT | Performed by: EMERGENCY MEDICINE

## 2025-08-26 PROCEDURE — 73630 X-RAY EXAM OF FOOT: CPT | Performed by: PHYSICIAN ASSISTANT

## 2025-08-26 PROCEDURE — 99499 UNLISTED E&M SERVICE: CPT | Performed by: NURSE PRACTITIONER

## 2025-08-26 RX ORDER — POTASSIUM CHLORIDE 1500 MG/1
20 TABLET, EXTENDED RELEASE ORAL DAILY
Status: DISCONTINUED | OUTPATIENT
Start: 2025-08-26 | End: 2025-08-27

## 2025-08-26 RX ORDER — BISACODYL 10 MG
10 SUPPOSITORY, RECTAL RECTAL
Status: DISCONTINUED | OUTPATIENT
Start: 2025-08-26 | End: 2025-08-27

## 2025-08-26 RX ORDER — PANTOPRAZOLE SODIUM 40 MG/1
40 TABLET, DELAYED RELEASE ORAL
Status: DISCONTINUED | OUTPATIENT
Start: 2025-08-26 | End: 2025-08-27

## 2025-08-26 RX ORDER — NICOTINE POLACRILEX 4 MG
15 LOZENGE BUCCAL
Status: DISCONTINUED | OUTPATIENT
Start: 2025-08-26 | End: 2025-08-27

## 2025-08-26 RX ORDER — DEXTROSE MONOHYDRATE 25 G/50ML
50 INJECTION, SOLUTION INTRAVENOUS
Status: DISCONTINUED | OUTPATIENT
Start: 2025-08-26 | End: 2025-08-27

## 2025-08-26 RX ORDER — ASPIRIN 81 MG/1
81 TABLET ORAL DAILY
Status: DISCONTINUED | OUTPATIENT
Start: 2025-08-27 | End: 2025-08-27

## 2025-08-26 RX ORDER — SENNOSIDES 8.6 MG
17.2 TABLET ORAL NIGHTLY PRN
Status: DISCONTINUED | OUTPATIENT
Start: 2025-08-26 | End: 2025-08-27

## 2025-08-26 RX ORDER — POLYETHYLENE GLYCOL 3350 17 G/17G
17 POWDER, FOR SOLUTION ORAL DAILY PRN
Status: DISCONTINUED | OUTPATIENT
Start: 2025-08-26 | End: 2025-08-27

## 2025-08-26 RX ORDER — DOBUTAMINE HYDROCHLORIDE 200 MG/100ML
2.5 INJECTION INTRAVENOUS CONTINUOUS
Status: DISCONTINUED | OUTPATIENT
Start: 2025-08-26 | End: 2025-08-27

## 2025-08-26 RX ORDER — POTASSIUM CHLORIDE 1500 MG/1
40 TABLET, EXTENDED RELEASE ORAL ONCE
Status: COMPLETED | OUTPATIENT
Start: 2025-08-26 | End: 2025-08-26

## 2025-08-26 RX ORDER — SODIUM PHOSPHATE, DIBASIC AND SODIUM PHOSPHATE, MONOBASIC 7; 19 G/230ML; G/230ML
1 ENEMA RECTAL ONCE AS NEEDED
Status: DISCONTINUED | OUTPATIENT
Start: 2025-08-26 | End: 2025-08-27

## 2025-08-26 RX ORDER — QUETIAPINE FUMARATE 25 MG/1
50 TABLET, FILM COATED ORAL 2 TIMES DAILY
Status: DISCONTINUED | OUTPATIENT
Start: 2025-08-26 | End: 2025-08-27

## 2025-08-26 RX ORDER — NICOTINE POLACRILEX 4 MG
30 LOZENGE BUCCAL
Status: DISCONTINUED | OUTPATIENT
Start: 2025-08-26 | End: 2025-08-27

## 2025-08-26 RX ORDER — DOCUSATE SODIUM 100 MG/1
100 CAPSULE, LIQUID FILLED ORAL 2 TIMES DAILY PRN
Status: DISCONTINUED | OUTPATIENT
Start: 2025-08-26 | End: 2025-08-27

## 2025-08-26 RX ORDER — POTASSIUM CHLORIDE 1500 MG/1
60 TABLET, EXTENDED RELEASE ORAL ONCE
Status: DISCONTINUED | OUTPATIENT
Start: 2025-08-26 | End: 2025-08-26

## 2025-08-26 RX ORDER — ALLOPURINOL 100 MG/1
100 TABLET ORAL DAILY
Status: DISCONTINUED | OUTPATIENT
Start: 2025-08-27 | End: 2025-08-27

## 2025-08-27 VITALS
DIASTOLIC BLOOD PRESSURE: 65 MMHG | HEIGHT: 72 IN | HEART RATE: 112 BPM | RESPIRATION RATE: 18 BRPM | BODY MASS INDEX: 28.1 KG/M2 | WEIGHT: 207.44 LBS | TEMPERATURE: 98 F | OXYGEN SATURATION: 100 % | SYSTOLIC BLOOD PRESSURE: 101 MMHG

## 2025-08-27 PROBLEM — M19.072 PRIMARY OSTEOARTHRITIS OF LEFT FOOT: Status: ACTIVE | Noted: 2025-08-27

## 2025-08-27 PROBLEM — L08.9 SOFT TISSUE INFECTION: Status: ACTIVE | Noted: 2025-08-27

## 2025-08-27 PROBLEM — R00.1 BRADYCARDIA: Status: ACTIVE | Noted: 2025-08-27

## 2025-08-27 PROBLEM — M10.071 ACUTE IDIOPATHIC GOUT OF RIGHT FOOT: Status: ACTIVE | Noted: 2025-08-27

## 2025-08-27 PROBLEM — M79.672 BILATERAL FOOT PAIN: Status: ACTIVE | Noted: 2025-08-27

## 2025-08-27 PROBLEM — I50.82 BIVENTRICULAR HEART FAILURE (HCC): Status: ACTIVE | Noted: 2025-08-27

## 2025-08-27 PROBLEM — M79.671 BILATERAL FOOT PAIN: Status: ACTIVE | Noted: 2025-08-27

## 2025-08-27 LAB
ANION GAP SERPL CALC-SCNC: 11 MMOL/L (ref 0–18)
BASOPHILS # BLD AUTO: 0.09 X10(3) UL (ref 0–0.2)
BASOPHILS NFR BLD AUTO: 1.1 %
BUN BLD-MCNC: 9 MG/DL (ref 9–23)
CALCIUM BLD-MCNC: 9.2 MG/DL (ref 8.7–10.6)
CHLORIDE SERPL-SCNC: 101 MMOL/L (ref 98–112)
CO2 SERPL-SCNC: 27 MMOL/L (ref 21–32)
CREAT BLD-MCNC: 0.6 MG/DL (ref 0.7–1.3)
EGFRCR SERPLBLD CKD-EPI 2021: 121 ML/MIN/1.73M2 (ref 60–?)
EOSINOPHIL # BLD AUTO: 0.27 X10(3) UL (ref 0–0.7)
EOSINOPHIL NFR BLD AUTO: 3.4 %
ERYTHROCYTE [DISTWIDTH] IN BLOOD BY AUTOMATED COUNT: 17.2 %
GLUCOSE BLD-MCNC: 110 MG/DL (ref 70–99)
GLUCOSE BLD-MCNC: 118 MG/DL (ref 70–99)
GLUCOSE BLD-MCNC: 118 MG/DL (ref 70–99)
GLUCOSE BLD-MCNC: 124 MG/DL (ref 70–99)
HCT VFR BLD AUTO: 31.3 % (ref 39–53)
HGB BLD-MCNC: 10.3 G/DL (ref 13–17.5)
IMM GRANULOCYTES # BLD AUTO: 0.03 X10(3) UL (ref 0–1)
IMM GRANULOCYTES NFR BLD: 0.4 %
LYMPHOCYTES # BLD AUTO: 1.99 X10(3) UL (ref 1–4)
LYMPHOCYTES NFR BLD AUTO: 25.3 %
MCH RBC QN AUTO: 28 PG (ref 26–34)
MCHC RBC AUTO-ENTMCNC: 32.9 G/DL (ref 31–37)
MCV RBC AUTO: 85.1 FL (ref 80–100)
MONOCYTES # BLD AUTO: 0.7 X10(3) UL (ref 0.1–1)
MONOCYTES NFR BLD AUTO: 8.9 %
NEUTROPHILS # BLD AUTO: 4.8 X10 (3) UL (ref 1.5–7.7)
NEUTROPHILS # BLD AUTO: 4.8 X10(3) UL (ref 1.5–7.7)
NEUTROPHILS NFR BLD AUTO: 60.9 %
OSMOLALITY SERPL CALC.SUM OF ELEC: 287 MOSM/KG (ref 275–295)
PLATELET # BLD AUTO: 356 10(3)UL (ref 150–450)
POTASSIUM SERPL-SCNC: 3.2 MMOL/L (ref 3.5–5.1)
POTASSIUM SERPL-SCNC: 3.5 MMOL/L (ref 3.5–5.1)
RBC # BLD AUTO: 3.68 X10(6)UL (ref 4.3–5.7)
SODIUM SERPL-SCNC: 139 MMOL/L (ref 136–145)
URATE SERPL-MCNC: 6 MG/DL (ref 3.7–9.2)
WBC # BLD AUTO: 7.9 X10(3) UL (ref 4–11)

## 2025-08-27 PROCEDURE — 99239 HOSP IP/OBS DSCHRG MGMT >30: CPT | Performed by: HOSPITALIST

## 2025-08-27 PROCEDURE — 99223 1ST HOSP IP/OBS HIGH 75: CPT | Performed by: INTERNAL MEDICINE

## 2025-08-27 RX ORDER — COLCHICINE 0.6 MG/1
0.6 TABLET ORAL 2 TIMES DAILY
Qty: 60 TABLET | Refills: 1 | Status: SHIPPED | OUTPATIENT
Start: 2025-08-27

## 2025-08-27 RX ORDER — POTASSIUM CHLORIDE 1500 MG/1
40 TABLET, EXTENDED RELEASE ORAL EVERY 4 HOURS
Status: COMPLETED | OUTPATIENT
Start: 2025-08-27 | End: 2025-08-27

## 2025-08-27 RX ORDER — DOBUTAMINE HYDROCHLORIDE 200 MG/100ML
2.5 INJECTION INTRAVENOUS CONTINUOUS
Qty: 1 EACH | Refills: 3 | Status: SHIPPED | OUTPATIENT
Start: 2025-08-27 | End: 2025-08-27

## 2025-08-27 RX ORDER — SPIRONOLACTONE 25 MG/1
12.5 TABLET ORAL DAILY
Status: DISCONTINUED | OUTPATIENT
Start: 2025-08-27 | End: 2025-08-27

## 2025-08-27 RX ORDER — TORSEMIDE 20 MG/1
40 TABLET ORAL 2 TIMES DAILY
Status: DISCONTINUED | OUTPATIENT
Start: 2025-08-27 | End: 2025-08-27

## 2025-08-27 RX ORDER — LOSARTAN POTASSIUM 25 MG/1
25 TABLET ORAL DAILY
Status: DISCONTINUED | OUTPATIENT
Start: 2025-08-27 | End: 2025-08-27

## 2025-08-27 RX ORDER — COLCHICINE 0.6 MG/1
0.6 TABLET ORAL DAILY
Status: DISCONTINUED | OUTPATIENT
Start: 2025-08-27 | End: 2025-08-27

## 2025-08-27 RX ORDER — LOSARTAN POTASSIUM 25 MG/1
25 TABLET ORAL DAILY
Qty: 30 TABLET | Refills: 2 | Status: SHIPPED | OUTPATIENT
Start: 2025-08-27

## 2025-08-27 RX ORDER — SPIRONOLACTONE 25 MG/1
12.5 TABLET ORAL DAILY
Qty: 30 TABLET | Refills: 2 | Status: SHIPPED | OUTPATIENT
Start: 2025-08-27

## 2025-08-27 RX ORDER — DOBUTAMINE HYDROCHLORIDE 200 MG/100ML
2.5 INJECTION INTRAVENOUS CONTINUOUS
Qty: 1 EACH | Refills: 3 | Status: SHIPPED | OUTPATIENT
Start: 2025-08-27

## 2025-08-28 ENCOUNTER — PATIENT OUTREACH (OUTPATIENT)
Age: 45
End: 2025-08-28

## 2025-08-29 ENCOUNTER — OFFICE VISIT (OUTPATIENT)
Dept: INTERNAL MEDICINE CLINIC | Facility: CLINIC | Age: 45
End: 2025-08-29

## 2025-08-29 VITALS
HEART RATE: 98 BPM | WEIGHT: 205 LBS | BODY MASS INDEX: 28 KG/M2 | TEMPERATURE: 97 F | OXYGEN SATURATION: 97 % | DIASTOLIC BLOOD PRESSURE: 70 MMHG | RESPIRATION RATE: 18 BRPM | SYSTOLIC BLOOD PRESSURE: 110 MMHG

## 2025-08-29 DIAGNOSIS — I48.92 ATRIAL FLUTTER, UNSPECIFIED TYPE (HCC): ICD-10-CM

## 2025-08-29 DIAGNOSIS — M10.072 ACUTE IDIOPATHIC GOUT OF LEFT FOOT: ICD-10-CM

## 2025-08-29 DIAGNOSIS — Z79.01 ANTICOAGULATED ON APIXABAN: ICD-10-CM

## 2025-08-29 DIAGNOSIS — K65.9 PERITONITIS (HCC): ICD-10-CM

## 2025-08-29 DIAGNOSIS — M79.671 BILATERAL FOOT PAIN: ICD-10-CM

## 2025-08-29 DIAGNOSIS — I82.621 ACUTE DEEP VEIN THROMBOSIS (DVT) OF RIGHT UPPER EXTREMITY, UNSPECIFIED VEIN (HCC): ICD-10-CM

## 2025-08-29 DIAGNOSIS — F10.21 ALCOHOL DEPENDENCE IN REMISSION (HCC): ICD-10-CM

## 2025-08-29 DIAGNOSIS — F17.201 TOBACCO DEPENDENCE IN REMISSION: ICD-10-CM

## 2025-08-29 DIAGNOSIS — K70.30 ALCOHOLIC CIRRHOSIS OF LIVER WITHOUT ASCITES (HCC): ICD-10-CM

## 2025-08-29 DIAGNOSIS — E87.6 HYPOKALEMIA: ICD-10-CM

## 2025-08-29 DIAGNOSIS — B37.1: ICD-10-CM

## 2025-08-29 DIAGNOSIS — E11.9 TYPE 2 DIABETES, DIET CONTROLLED (HCC): ICD-10-CM

## 2025-08-29 DIAGNOSIS — I42.8 NICM (NONISCHEMIC CARDIOMYOPATHY) (HCC): ICD-10-CM

## 2025-08-29 DIAGNOSIS — Z79.899 RECEIVING INOTROPIC MEDICATION: ICD-10-CM

## 2025-08-29 DIAGNOSIS — M10.071 ACUTE IDIOPATHIC GOUT OF RIGHT FOOT: ICD-10-CM

## 2025-08-29 DIAGNOSIS — I95.9 HYPOTENSION, UNSPECIFIED HYPOTENSION TYPE: Primary | ICD-10-CM

## 2025-08-29 DIAGNOSIS — R91.8 OPACITY OF LUNG ON IMAGING STUDY: ICD-10-CM

## 2025-08-29 DIAGNOSIS — F12.91 MARIJUANA USE DISORDER IN REMISSION: ICD-10-CM

## 2025-08-29 DIAGNOSIS — M79.672 BILATERAL FOOT PAIN: ICD-10-CM

## 2025-08-29 DIAGNOSIS — F14.11 COCAINE ABUSE IN REMISSION (HCC): ICD-10-CM

## 2025-08-29 DIAGNOSIS — Z95.810 USES WEARABLE GARMENT CONTAINING EXTERNAL DEFIBRILLATOR WITH ATTACHED MONITOR: ICD-10-CM

## 2025-08-29 DIAGNOSIS — I50.9 ACUTE ON CHRONIC CONGESTIVE HEART FAILURE, UNSPECIFIED HEART FAILURE TYPE (HCC): ICD-10-CM

## 2025-08-30 PROBLEM — R91.8 OPACITY OF LUNG ON IMAGING STUDY: Status: ACTIVE | Noted: 2025-08-30

## 2025-08-30 PROBLEM — F17.201 TOBACCO DEPENDENCE IN REMISSION: Status: ACTIVE | Noted: 2022-06-09

## 2025-08-30 PROBLEM — M10.072 ACUTE IDIOPATHIC GOUT OF LEFT FOOT: Status: ACTIVE | Noted: 2025-08-30

## (undated) DEVICE — SUTURE VICRYL 0 UR-6

## (undated) DEVICE — C-ARM: Brand: UNBRANDED

## (undated) DEVICE — TIGERTAIL 5F FLXTIP 70CM

## (undated) DEVICE — SUTURE VICRYL 3-0 SH

## (undated) DEVICE — TROCAR: Brand: KII SHIELDED BLADED ACCESS SYSTEM

## (undated) DEVICE — STERILE POLYISOPRENE POWDER-FREE SURGICAL GLOVES: Brand: PROTEXIS

## (undated) DEVICE — EXOFIN TISSUE ADHESIVE 1.0ML

## (undated) DEVICE — CAUTERY PENCIL

## (undated) DEVICE — ENDOPATH 5MM CURVED SCISSORS WITH MONOPOLAR CAUTERY: Brand: ENDOPATH

## (undated) DEVICE — SCD SLEEVE KNEE HI BLEND

## (undated) DEVICE — SOL  .9 1000ML BTL

## (undated) DEVICE — TROCAR: Brand: KII® SLEEVE

## (undated) DEVICE — ANCHOR TISSUE RETRIEVAL SYSTEM, BAG SIZE 175 ML, PORT SIZE 10 MM: Brand: ANCHOR TISSUE RETRIEVAL SYSTEM

## (undated) DEVICE — ZIPWIRE GUIDEWIRE .035X150 STR

## (undated) DEVICE — STERILE POLYISOPRENE POWDER-FREE SURGICAL GLOVES WITH EMOLLIENT COATING: Brand: PROTEXIS

## (undated) DEVICE — LIGHT HANDLE

## (undated) DEVICE — DISPOSABLE LAPAROSCOPIC CLIP APPLIER WITH 20 CLIPS.: Brand: EPIX® UNIVERSAL CLIP APPLIER

## (undated) DEVICE — Device

## (undated) DEVICE — CHLORAPREP 26ML APPLICATOR

## (undated) DEVICE — SUTURE VICRYL 5-0 PC-1

## (undated) DEVICE — TROCARS: Brand: KII® BALLOON BLUNT TIP SYSTEM

## (undated) DEVICE — LAP CHOLE/APPY CDS-LF: Brand: MEDLINE INDUSTRIES, INC.

## (undated) NOTE — LETTER
Eunice Watts 89  [address] [facility]  Authorization for Surgical Operation and Procedure     Date:___________                                                                                                         Time:__________ but not all, of the potential risks that can occur: fever and allergic reactions, hemolytic reactions, transmission of diseases such as Hepatitis, AIDS and Cytomegalovirus (CMV) and fluid overload.   In the event that I wish to have an autologous transfusio attending physician will determine when the applicable recovery period ends for purposes of reinstating the DNAR order.   10. Patients having a sterilization procedure: I understand that if the procedure is successful the results will be permanent and it wi to: a. Allow the anesthesiologist (anesthesia doctor) to give me medicine and do additional procedures as necessary.  Some examples are: Starting or using an “IV” to give me medicine, fluids or blood during my procedure, and having a breathing tube placed (“spinal”, “epidural”, & “nerve blocks”): I understand that rare but potential complications include headache, bleeding, infection, seizure, irregular heart rhythms, and nerve injury.     I can change my mind about having anesthesia services at any time be

## (undated) NOTE — LETTER
Eunice Watts 89  [address] [facility]  Authorization for Surgical Operation and Procedure     Date:___________                                                                                                         Time:__________ not all, of the potential risks that can occur: fever and allergic reactions, hemolytic reactions, transmission of diseases such as Hepatitis, AIDS and Cytomegalovirus (CMV) and fluid overload.   In the event that I wish to have an autologous transfusion of attending physician will determine when the applicable recovery period ends for purposes of reinstating the DNAR order.   10. Patients having a sterilization procedure: I understand that if the procedure is successful the results will be permanent and it wi to: a. Allow the anesthesiologist (anesthesia doctor) to give me medicine and do additional procedures as necessary.  Some examples are: Starting or using an “IV” to give me medicine, fluids or blood during my procedure, and having a breathing tube placed (“spinal”, “epidural”, & “nerve blocks”): I understand that rare but potential complications include headache, bleeding, infection, seizure, irregular heart rhythms, and nerve injury.     I can change my mind about having anesthesia services at any time be